# Patient Record
Sex: FEMALE | Race: WHITE | Employment: OTHER | URBAN - METROPOLITAN AREA
[De-identification: names, ages, dates, MRNs, and addresses within clinical notes are randomized per-mention and may not be internally consistent; named-entity substitution may affect disease eponyms.]

---

## 2017-09-14 ENCOUNTER — TELEPHONE (OUTPATIENT)
Dept: ADMINISTRATIVE | Age: 52
End: 2017-09-14

## 2017-10-16 ENCOUNTER — OFFICE VISIT (OUTPATIENT)
Dept: INTERNAL MEDICINE | Age: 52
End: 2017-10-16

## 2017-10-16 ENCOUNTER — HOSPITAL ENCOUNTER (OUTPATIENT)
Dept: GENERAL RADIOLOGY | Age: 52
Discharge: HOME | End: 2017-10-16
Payer: MEDICARE

## 2017-10-16 VITALS
OXYGEN SATURATION: 94 % | BODY MASS INDEX: 30.22 KG/M2 | DIASTOLIC BLOOD PRESSURE: 80 MMHG | SYSTOLIC BLOOD PRESSURE: 128 MMHG | TEMPERATURE: 97.1 F | HEART RATE: 88 BPM | HEIGHT: 64 IN | WEIGHT: 177 LBS

## 2017-10-16 DIAGNOSIS — Z12.11 COLON CANCER SCREENING: ICD-10-CM

## 2017-10-16 DIAGNOSIS — F32.4 DEPRESSION, MAJOR, SINGLE EPISODE, IN PARTIAL REMISSION (HCC): ICD-10-CM

## 2017-10-16 DIAGNOSIS — Z12.31 VISIT FOR SCREENING MAMMOGRAM: ICD-10-CM

## 2017-10-16 DIAGNOSIS — J45.41 MODERATE PERSISTENT ASTHMATIC BRONCHITIS WITH ACUTE EXACERBATION: ICD-10-CM

## 2017-10-16 DIAGNOSIS — J45.41 MODERATE PERSISTENT ASTHMATIC BRONCHITIS WITH ACUTE EXACERBATION: Primary | ICD-10-CM

## 2017-10-16 PROCEDURE — G8427 DOCREV CUR MEDS BY ELIG CLIN: HCPCS | Performed by: INTERNAL MEDICINE

## 2017-10-16 PROCEDURE — G8484 FLU IMMUNIZE NO ADMIN: HCPCS | Performed by: INTERNAL MEDICINE

## 2017-10-16 PROCEDURE — 71020 XR CHEST STANDARD TWO VW: CPT

## 2017-10-16 PROCEDURE — 99214 OFFICE O/P EST MOD 30 MIN: CPT | Performed by: INTERNAL MEDICINE

## 2017-10-16 PROCEDURE — G8417 CALC BMI ABV UP PARAM F/U: HCPCS | Performed by: INTERNAL MEDICINE

## 2017-10-16 PROCEDURE — 4004F PT TOBACCO SCREEN RCVD TLK: CPT | Performed by: INTERNAL MEDICINE

## 2017-10-16 PROCEDURE — 3017F COLORECTAL CA SCREEN DOC REV: CPT | Performed by: INTERNAL MEDICINE

## 2017-10-16 RX ORDER — ALBUTEROL SULFATE 90 UG/1
2 AEROSOL, METERED RESPIRATORY (INHALATION) EVERY 6 HOURS PRN
Qty: 1 INHALER | Refills: 3 | Status: SHIPPED | OUTPATIENT
Start: 2017-10-16 | End: 2018-10-03 | Stop reason: SDUPTHER

## 2017-10-16 RX ORDER — PREDNISONE 20 MG/1
20 TABLET ORAL 2 TIMES DAILY
Qty: 10 TABLET | Refills: 0 | Status: SHIPPED | OUTPATIENT
Start: 2017-10-16 | End: 2017-10-21

## 2017-10-16 RX ORDER — DEXTROMETHORPHAN HYDROBROMIDE AND PROMETHAZINE HYDROCHLORIDE 15; 6.25 MG/5ML; MG/5ML
5 SYRUP ORAL 4 TIMES DAILY PRN
Qty: 118 ML | Refills: 1 | Status: SHIPPED | OUTPATIENT
Start: 2017-10-16 | End: 2017-10-23

## 2017-10-16 RX ORDER — AZITHROMYCIN 250 MG/1
TABLET, FILM COATED ORAL
Qty: 1 PACKET | Refills: 0 | Status: SHIPPED | OUTPATIENT
Start: 2017-10-16 | End: 2017-10-26

## 2017-10-16 ASSESSMENT — PATIENT HEALTH QUESTIONNAIRE - PHQ9
1. LITTLE INTEREST OR PLEASURE IN DOING THINGS: 0
2. FEELING DOWN, DEPRESSED OR HOPELESS: 0
SUM OF ALL RESPONSES TO PHQ QUESTIONS 1-9: 0
SUM OF ALL RESPONSES TO PHQ9 QUESTIONS 1 & 2: 0

## 2017-10-16 ASSESSMENT — ENCOUNTER SYMPTOMS
ABDOMINAL DISTENTION: 0
COUGH: 1
CHOKING: 0
WHEEZING: 1
HEARTBURN: 0
SHORTNESS OF BREATH: 1
TROUBLE SWALLOWING: 0
BACK PAIN: 1
SORE THROAT: 0
VOICE CHANGE: 0
CHEST TIGHTNESS: 1
ABDOMINAL PAIN: 0

## 2017-10-16 NOTE — PROGRESS NOTES
Doreen Mccollum is a 49 y.o. female smoker, with history of depression, chronic back pain issues, who presents with     Chief Complaint   Patient presents with    Cough     5 days, SOB, I had bronchitis but his is different\", has just quit smoking    Health Maintenance     mammogram, colonoscopy    The patient has not been seen in the office for 2 years . The following laboratory reports since the past visit were reviewed (the ones pertinent to today's visit were discussed with the patient):    No visits with results within 3 Month(s) from this visit.    Latest known visit with results is:   Hospital Outpatient Visit on 04/30/2015   Component Date Value Ref Range Status    WBC 04/30/2015 12.5* 4.8 - 10.8 K/uL Final    RBC 04/30/2015 4.49  4.20 - 5.40 M/uL Final    Hemoglobin 04/30/2015 14.2  12.0 - 16.0 g/dL Final    Hematocrit 04/30/2015 44.0  37.0 - 47.0 % Final    MCV 04/30/2015 98.0  82.0 - 100.0 fL Final    MCH 04/30/2015 31.6* 27.0 - 31.3 pg Final    MCHC 04/30/2015 32.3* 33.0 - 37.0 % Final    RDW 04/30/2015 13.7  11.5 - 14.5 % Final    Platelets 97/30/3377 315  130 - 400 K/uL Final    MPV 04/30/2015 10.3  7.4 - 10.4 fL Final    Neutrophils % 04/30/2015 78.9  % Final    Lymphocytes % 04/30/2015 14.2  % Final    Monocytes % 04/30/2015 5.3  % Final    Eosinophils % 04/30/2015 0.5  % Final    Basophils % 04/30/2015 1.1  % Final    Neutrophils # 04/30/2015 9.8* 1.4 - 6.5 K/uL Final    Lymphocytes # 04/30/2015 1.8  1.0 - 4.8 K/uL Final    Monocytes # 04/30/2015 0.7  0.2 - 0.8 K/uL Final    Eosinophils # 04/30/2015 0.1  0.0 - 0.7 K/uL Final    Basophils # 04/30/2015 0.1  0.0 - 0.2 K/uL Final    Sodium 04/30/2015 138  132 - 144 mEq/L Final    Potassium 04/30/2015 4.1  3.5 - 5.1 mEq/L Final    Chloride 04/30/2015 103  98 - 107 mEq/L Final    CO2 04/30/2015 22  22 - 29 mEq/L Final    Anion Gap 04/30/2015 13  7 - 13 mEq/L Final    Glucose 04/30/2015 101  74 - 109 mg/dL Final    BUN is oriented to person, place, and time. She appears distressed (anxious). Obese, age appropriate, well groomed     HENT:   Head: Atraumatic. Mouth/Throat: Oropharynx is clear and moist.   Eyes: Conjunctivae and EOM are normal. Right eye exhibits no discharge. Left eye exhibits no discharge. Neck: Normal range of motion. Neck supple. No JVD present. No thyromegaly present. Cardiovascular: Regular rhythm and normal heart sounds. Exam reveals no gallop. Pulmonary/Chest: Effort normal. No respiratory distress. She has wheezes (Diffuse, bilateral, air entry remains adequate). She has rales (Small rhonchi, diffuse, bilateral). She exhibits no tenderness. Abdominal: Soft. She exhibits no distension. There is no tenderness. Musculoskeletal: Normal range of motion. Lymphadenopathy:     She has no cervical adenopathy. Neurological: She is alert and oriented to person, place, and time. Coordination normal.   Skin: Skin is warm. No rash noted. Psychiatric: Her speech is normal. Her mood appears anxious. She is not slowed and not withdrawn. Cognition and memory are normal. She does not express inappropriate judgment. She does not exhibit a depressed mood. She exhibits normal recent memory and normal remote memory. She is attentive. Assessment:    Gabrielle Garcia was seen today for cough and health maintenance. Diagnoses and all orders for this visit:    Moderate persistent asthmatic bronchitis with acute exacerbation  -     XR CHEST STANDARD (2 VW); Future        Schedule PFT after full resolution of the acute episode, suspect COPD    Visit for screening mammogram  -     ERNESTO DIGITAL SCREEN W CAD BILATERAL; Future    Colon cancer screening  -     Referral To Gastroenterology - ([facility]IL[facility]) Daryn Rai MD    Other orders  -     predniSONE (DELTASONE) 20 MG tablet; Take 1 tablet by mouth 2 times daily for 5 days  -     azithromycin (ZITHROMAX) 250 MG tablet;  Take 2 tabs (500 mg) on Day 1, and take 1 tab (250 mg) on findings, after discussion and with patient's consent. Will contact patient for discussion and further management planning once reports available for review. Orders Placed This Encounter   Procedures    ERNESTO DIGITAL SCREEN W CAD BILATERAL     Standing Status:   Future     Standing Expiration Date:   10/16/2018    XR CHEST STANDARD (2 VW)     Standing Status:   Future     Number of Occurrences:   1     Standing Expiration Date:   10/16/2018    Referral To Gastroenterology - ([facility]ILA) Raye Habermann, MD     Referral Priority:   Routine     Referral Type:   Consult for Advice and Opinion     Referral Reason:   Specialty Services Required     Referred to Provider:   Cyndy Zarate MD     Requested Specialty:   Gastroenterology     Number of Visits Requested:   1     Patient will call if the prescribed treatment does not help relieve symptoms of the presenting condition (or for any medication-related adverse effects), or go to the ER if symptoms worsen. Further workup and plan will be determined based on clinical progression and follow up test/ treatment results. Close follow up needed to evaluate treatment results and for care coordination. Return in about 3 weeks (around 11/6/2017). I have reviewed the patient's medical and surgical, family and social history, health maintenance schedule, and updated the computerized patient record.        Electronically signed by Shailesh Bowser MD

## 2017-10-30 ENCOUNTER — TELEPHONE (OUTPATIENT)
Dept: INTERNAL MEDICINE | Age: 52
End: 2017-10-30

## 2017-10-30 RX ORDER — AZITHROMYCIN 250 MG/1
TABLET, FILM COATED ORAL
Qty: 1 PACKET | Refills: 0 | Status: SHIPPED | OUTPATIENT
Start: 2017-10-30 | End: 2017-11-06 | Stop reason: ALTCHOICE

## 2017-11-03 ENCOUNTER — TELEPHONE (OUTPATIENT)
Dept: INTERNAL MEDICINE | Age: 52
End: 2017-11-03

## 2017-11-03 DIAGNOSIS — J45.901 ASTHMATIC BRONCHITIS WITH ACUTE EXACERBATION, UNSPECIFIED ASTHMA SEVERITY, UNSPECIFIED WHETHER PERSISTENT: Primary | ICD-10-CM

## 2017-11-03 RX ORDER — PREDNISONE 20 MG/1
20 TABLET ORAL 2 TIMES DAILY
Qty: 10 TABLET | Refills: 0 | Status: SHIPPED | OUTPATIENT
Start: 2017-11-03 | End: 2017-11-08

## 2017-11-03 NOTE — TELEPHONE ENCOUNTER
Pt states asthma has been bothering her and wants to know if you can prescribe prednisone please to [facility]

## 2017-11-06 ENCOUNTER — OFFICE VISIT (OUTPATIENT)
Dept: INTERNAL MEDICINE | Age: 52
End: 2017-11-06

## 2017-11-06 VITALS
SYSTOLIC BLOOD PRESSURE: 140 MMHG | HEART RATE: 83 BPM | TEMPERATURE: 98.1 F | OXYGEN SATURATION: 95 % | WEIGHT: 176 LBS | BODY MASS INDEX: 30.05 KG/M2 | HEIGHT: 64 IN | DIASTOLIC BLOOD PRESSURE: 90 MMHG

## 2017-11-06 DIAGNOSIS — J45.41 MODERATE PERSISTENT ASTHMATIC BRONCHITIS WITH ACUTE EXACERBATION: ICD-10-CM

## 2017-11-06 PROBLEM — J45.909 ASTHMATIC BRONCHITIS: Status: ACTIVE | Noted: 2017-01-01

## 2017-11-06 PROCEDURE — G8484 FLU IMMUNIZE NO ADMIN: HCPCS | Performed by: INTERNAL MEDICINE

## 2017-11-06 PROCEDURE — 3014F SCREEN MAMMO DOC REV: CPT | Performed by: INTERNAL MEDICINE

## 2017-11-06 PROCEDURE — 99213 OFFICE O/P EST LOW 20 MIN: CPT | Performed by: INTERNAL MEDICINE

## 2017-11-06 PROCEDURE — 4004F PT TOBACCO SCREEN RCVD TLK: CPT | Performed by: INTERNAL MEDICINE

## 2017-11-06 PROCEDURE — G8417 CALC BMI ABV UP PARAM F/U: HCPCS | Performed by: INTERNAL MEDICINE

## 2017-11-06 PROCEDURE — G8427 DOCREV CUR MEDS BY ELIG CLIN: HCPCS | Performed by: INTERNAL MEDICINE

## 2017-11-06 PROCEDURE — 3017F COLORECTAL CA SCREEN DOC REV: CPT | Performed by: INTERNAL MEDICINE

## 2017-11-06 RX ORDER — PROMETHAZINE HYDROCHLORIDE AND PHENYLEPHRINE HYDROCHLORIDE 6.25; 5 MG/5ML; MG/5ML
5 SYRUP ORAL 4 TIMES DAILY PRN
Qty: 180 ML | Refills: 0 | Status: SHIPPED | OUTPATIENT
Start: 2017-11-06 | End: 2017-11-27

## 2017-11-06 RX ORDER — BENZONATATE 200 MG/1
200 CAPSULE ORAL 2 TIMES DAILY PRN
Qty: 20 CAPSULE | Refills: 1 | Status: ON HOLD | OUTPATIENT
Start: 2017-11-06 | End: 2017-11-18 | Stop reason: HOSPADM

## 2017-11-06 ASSESSMENT — ENCOUNTER SYMPTOMS
COUGH: 1
SORE THROAT: 0
WHEEZING: 0
SHORTNESS OF BREATH: 1
HEARTBURN: 0
BACK PAIN: 1

## 2017-11-06 NOTE — PROGRESS NOTES
Robb Henriquez is a 47 y.o. female smoker, with depression, chronic back pain issues, who presents with     Chief Complaint   Patient presents with    Cough     persistent x more than 3 weeks, was unable to smoke, no medication helped, is asking for a cough syrup she had in 2013 along with cough pills, just started the oral prednisone   Since the past office visit, the patient has continued to cough in spite of all the medications, over-the-counter and prescriptions. Chest x-ray was negative for pneumonia or COPD. The following laboratory reports since the past visit were reviewed (the ones pertinent to today's visit were discussed with the patient):    No visits with results within 3 Month(s) from this visit.    Latest known visit with results is:   Hospital Outpatient Visit on 04/30/2015   Component Date Value Ref Range Status    WBC 04/30/2015 12.5* 4.8 - 10.8 K/uL Final    RBC 04/30/2015 4.49  4.20 - 5.40 M/uL Final    Hemoglobin 04/30/2015 14.2  12.0 - 16.0 g/dL Final    Hematocrit 04/30/2015 44.0  37.0 - 47.0 % Final    MCV 04/30/2015 98.0  82.0 - 100.0 fL Final    MCH 04/30/2015 31.6* 27.0 - 31.3 pg Final    MCHC 04/30/2015 32.3* 33.0 - 37.0 % Final    RDW 04/30/2015 13.7  11.5 - 14.5 % Final    Platelets 31/74/4859 315  130 - 400 K/uL Final    MPV 04/30/2015 10.3  7.4 - 10.4 fL Final    Neutrophils % 04/30/2015 78.9  % Final    Lymphocytes % 04/30/2015 14.2  % Final    Monocytes % 04/30/2015 5.3  % Final    Eosinophils % 04/30/2015 0.5  % Final    Basophils % 04/30/2015 1.1  % Final    Neutrophils # 04/30/2015 9.8* 1.4 - 6.5 K/uL Final    Lymphocytes # 04/30/2015 1.8  1.0 - 4.8 K/uL Final    Monocytes # 04/30/2015 0.7  0.2 - 0.8 K/uL Final    Eosinophils # 04/30/2015 0.1  0.0 - 0.7 K/uL Final    Basophils # 04/30/2015 0.1  0.0 - 0.2 K/uL Final    Sodium 04/30/2015 138  132 - 144 mEq/L Final    Potassium 04/30/2015 4.1  3.5 - 5.1 mEq/L Final    Chloride 04/30/2015 103  98 - 107 mEq/L Final    CO2 04/30/2015 22  22 - 29 mEq/L Final    Anion Gap 04/30/2015 13  7 - 13 mEq/L Final    Glucose 04/30/2015 101  74 - 109 mg/dL Final    BUN 04/30/2015 6  6 - 20 mg/dL Final    CREATININE 04/30/2015 0.44* 0.50 - 0.90 mg/dL Final    GFR Non- 04/30/2015 >60.0  >60 Final    Comment: >60 mL/min/1.73m2 EGFR, calc. for ages 25 and older using the  MDRD formula (not corrected for weight), is valid for stable  renal function.  GFR  04/30/2015 >60.0  >60 Final    Comment: >60 mL/min/1.73m2 EGFR, calc. for ages 25 and older using the  MDRD formula (not corrected for weight), is valid for stable  renal function.  Calcium 04/30/2015 9.4  8.6 - 10.2 mg/dL Final    Total Protein 04/30/2015 7.3  6.4 - 8.1 g/dL Final    Alb 04/30/2015 4.6  3.9 - 4.9 g/dL Final    Total Bilirubin 04/30/2015 0.4  0.0 - 1.2 mg/dL Final    Alkaline Phosphatase 04/30/2015 61  40 - 130 U/L Final    ALT 04/30/2015 11  0 - 33 U/L Final    AST 04/30/2015 15  0 - 35 U/L Final    Globulin 04/30/2015 2.7  2.3 - 3.5 g/dL Final    Cholesterol, Total 04/30/2015 192  0 - 199 mg/dL Final    Triglycerides 04/30/2015 68  0 - 200 mg/dL Final    HDL 04/30/2015 75* 40 - 59 mg/dL Final    Comment: ATP III HDL Cholesterol Classification is high. Expected Values:  Males:    >55 = No Risk            35-55 = Moderate Risk            <35 = High Risk  Females:  >65 = No Risk            45-65 = Moderate Risk            <45 = High Risk  NCEP Guidelines: Third Report May 2001  >59 = negative risk factor for CHD  <40 = major risk factor for CHD      LDL Calculated 04/30/2015 103  0 - 129 mg/dL Final    Vit D, 25-Hydroxy 04/30/2015 27.3* 30.0 - 100.0 ng/mL Final    Comment: (20-30 ng/mL)  Insufficiency  This assay accurately quantifies the sum of vitamin D3, 25-Hydroxy and  vitamin D2, 25-Hyroxy.  TSH 04/30/2015 1.240  0.270 - 4.200 uIU/mL Final       HPI:    Cough   This is a recurrent problem.  The current episode started 1 to 4 weeks ago. The problem has been unchanged. The problem occurs constantly. The cough is productive of purulent sputum. Associated symptoms include myalgias (thigh muscles) and shortness of breath. Pertinent negatives include no chest pain, chills, fever, heartburn, nasal congestion, postnasal drip, rash, sore throat or wheezing. The symptoms are aggravated by exercise, lying down and stress. Risk factors for lung disease include smoking/tobacco exposure. She has tried OTC cough suppressant and rest for the symptoms. The treatment provided no relief. Her past medical history is significant for bronchitis. There is no history of environmental allergies. More detail above in the chief complaint(s) and below in the review of systems.      Past Medical History:   Diagnosis Date    Asthmatic bronchitis 2017    Chronic back pain greater than 3 months duration 2012    Dr Kimberly Parsons Depression 2010    Dr Hummel [facility])    Family history of malignant neoplasm of breast     H/O vitamin D deficiency        Past Surgical History:   Procedure Laterality Date    BREAST CYST EXCISION Right 2000    benign    TONSILLECTOMY         Social History     Social History    Marital status: Single     Spouse name: N/A    Number of children: 2    Years of education: N/A     Occupational History    , now on       Social History Main Topics    Smoking status: Current Some Day Smoker     Packs/day: 0.25     Types: Cigarettes    Smokeless tobacco: Never Used      Comment: pt trying to cut back    Alcohol use 0.0 oz/week    Drug use: No    Sexual activity: Yes     Other Topics Concern    Not on file     Social History Narrative    Born in [facility], one of 3 siblings    Single, 2 sons, in [place] and [place]    Lives in a mobil home in [place] alone    Worked as a  x 15 years    On Medicare for depression x 2011       Family History   Problem Relation Age of Onset    Heart Failure Mother      dec 80    Osteoarthritis Mother     Cancer Mother 61     breast     Heart Attack Father      dec 58    Hypertension Father     High Cholesterol Father     Cancer Sister 64     pancreas, survived   [facility] Depression Sister        Family and social history were reviewed and pertinent changes documented. ALLERGIES    Morphine; Abilify [aripiprazole]; and Effexor [venlafaxine]    Current Outpatient Prescriptions on File Prior to Visit   Medication Sig Dispense Refill    predniSONE (DELTASONE) 20 MG tablet Take 1 tablet by mouth 2 times daily for 5 days 10 tablet 0    albuterol sulfate HFA (PROAIR HFA) 108 (90 Base) MCG/ACT inhaler Inhale 2 puffs into the lungs every 6 hours as needed for Wheezing 1 Inhaler 3    FLUoxetine (PROZAC) 20 MG tablet Take 1 tablet by mouth daily 30 tablet 3    Vitamin D (CHOLECALCIFEROL) 1000 UNITS CAPS capsule Take 1,000 Units by mouth daily      Loratadine (CLARITIN) 10 MG CAPS Take 1 capsule by mouth daily      traMADol (ULTRAM) 50 MG tablet Take 50 mg by mouth every 6 hours as needed. 0     No current facility-administered medications on file prior to visit. Review of Systems   Constitutional: Negative for chills and fever. HENT: Negative for postnasal drip and sore throat. Respiratory: Positive for cough and shortness of breath. Negative for wheezing. Cardiovascular: Negative. Negative for chest pain and palpitations. Gastrointestinal: Negative for heartburn. Endocrine: Negative for cold intolerance and heat intolerance. Genitourinary: Negative for hematuria. Musculoskeletal: Positive for back pain and myalgias (thigh muscles). Negative for arthralgias, neck pain and neck stiffness. Skin: Negative for rash. Allergic/Immunologic: Negative for environmental allergies. Neurological: Negative for speech difficulty, weakness and light-headedness.    Psychiatric/Behavioral: Positive for dysphoric mood and sleep disturbance (due ot cough). Negative for decreased concentration. The patient is nervous/anxious. Vitals:    11/06/17 1008   BP: (!) 140/90   Site: Right Arm   Position: Sitting   Cuff Size: Medium Adult   Pulse: 83   Temp: 98.1 °F (36.7 °C)   TempSrc: Tympanic   SpO2: 95%   Weight: 176 lb (79.8 kg)   Height: 5' 4\" (1.626 m)       Physical Exam   Constitutional: She is oriented to person, place, and time. She appears distressed (anxious). Obese, age appropriate, well groomed     HENT:   Head: Atraumatic. Mouth/Throat: Oropharynx is clear and moist.   Eyes: Conjunctivae and EOM are normal.   Neck: Normal range of motion. Neck supple. No JVD present. Cardiovascular: Regular rhythm and normal heart sounds. Exam reveals no gallop. Pulmonary/Chest: Effort normal. No respiratory distress. She has no wheezes. She has no rales. Diminished breath sounds bilaterally     Abdominal: Soft. She exhibits no distension. Neurological: She is alert and oriented to person, place, and time. Skin: Skin is warm. There is erythema (facial plethora). Psychiatric: Judgment normal. Her mood appears anxious. Her speech is rapid and/or pressured. She is not slowed and not withdrawn. Thought content is not delusional. Cognition and memory are normal. She does not express inappropriate judgment. She exhibits a depressed mood. She exhibits normal recent memory and normal remote memory. She is attentive. Assessment:    Kayley Francis was seen today for cough. Diagnoses and all orders for this visit:    Moderate persistent asthmatic bronchitis with acute exacerbation  Comments:  Continue oral steroids, start antihistamine decongestant syrup, abstain from smoking  Pulmonary function tests when the acute illness resolves    Other orders  -     Promethazine-Phenylephrine (PHENERGAN-VC) 6.25-5 MG/5ML syrup; Take 5 mLs by mouth 4 times daily as needed for Cough  -     benzonatate (TESSALON) 200 MG capsule;  Take 1 capsule by mouth 2 times daily as needed for Cough  -     umeclidinium-vilanterol (ANORO ELLIPTA) 62.5-25 MCG/INH AEPB inhaler; Inhale 1 puff into the lungs daily        Plan:    Reviewed with the patient (/and caregiver if present): current health status, medications, activities and diet. See also orders and comments in the assessment section. Today's diagnosis (in the context of chronic problems) was discussed with patient (/and caregiver if present), questions answered. Smoking cessation discussed, motivational counseling done, smoking cessation aids offered, patient will focus on a preferred personal approach to this problem and ask for our help as needed. Side effects, adverse effects of the medication prescribed (or refilled) today, as well as treatment plan/ rationale and result expectations have (again) been discussed with the patient who expresses understanding and consents to proceed as outlined above. Additional advise included in the \"after visit summary\". Orders Placed This Encounter   Medications    Promethazine-Phenylephrine (PHENERGAN-VC) 6.25-5 MG/5ML syrup     Sig: Take 5 mLs by mouth 4 times daily as needed for Cough     Dispense:  180 mL     Refill:  0    benzonatate (TESSALON) 200 MG capsule     Sig: Take 1 capsule by mouth 2 times daily as needed for Cough     Dispense:  20 capsule     Refill:  1    umeclidinium-vilanterol (ANORO ELLIPTA) 62.5-25 MCG/INH AEPB inhaler     Sig: Inhale 1 puff into the lungs daily     Dispense:  1 each     Refill:  0     Further workup and plan will be determined based on clinical progression and follow up test/ treatment results. Close follow up needed to evaluate treatment results and for care coordination. Return if symptoms worsen or fail to improve. I have reviewed the patient's medical and surgical, family and social history, health maintenance schedule, and updated the computerized patient record.     Please note this report has been partially produced by using

## 2017-11-08 ENCOUNTER — TELEPHONE (OUTPATIENT)
Dept: INTERNAL MEDICINE | Age: 52
End: 2017-11-08

## 2017-11-08 RX ORDER — UMECLIDINIUM 62.5 UG/1
AEROSOL, POWDER ORAL
Refills: 0 | COMMUNITY
Start: 2017-11-06 | End: 2017-12-12

## 2017-11-08 RX ORDER — TOBRAMYCIN 3 MG/ML
1 SOLUTION/ DROPS OPHTHALMIC EVERY 4 HOURS
Qty: 5 ML | Refills: 0 | Status: ON HOLD | OUTPATIENT
Start: 2017-11-08 | End: 2017-11-18 | Stop reason: HOSPADM

## 2017-11-14 ENCOUNTER — APPOINTMENT (OUTPATIENT)
Dept: GENERAL RADIOLOGY | Age: 52
DRG: 192 | End: 2017-11-14
Payer: MEDICARE

## 2017-11-14 ENCOUNTER — HOSPITAL ENCOUNTER (INPATIENT)
Age: 52
LOS: 4 days | Discharge: HOME | DRG: 192 | End: 2017-11-18
Attending: INTERNAL MEDICINE | Admitting: INTERNAL MEDICINE
Payer: MEDICARE

## 2017-11-14 DIAGNOSIS — J44.9 CHRONIC OBSTRUCTIVE PULMONARY DISEASE, UNSPECIFIED COPD TYPE (HCC): Primary | ICD-10-CM

## 2017-11-14 DIAGNOSIS — R09.02 HYPOXIA: ICD-10-CM

## 2017-11-14 PROBLEM — J44.0 COPD (CHRONIC OBSTRUCTIVE PULMONARY DISEASE) WITH ACUTE BRONCHITIS (HCC): Status: ACTIVE | Noted: 2017-11-14

## 2017-11-14 PROBLEM — Z72.0 TOBACCO ABUSE: Status: ACTIVE | Noted: 2017-11-14

## 2017-11-14 PROBLEM — J20.9 ACUTE BRONCHITIS: Status: ACTIVE | Noted: 2017-11-14

## 2017-11-14 LAB
ALBUMIN SERPL-MCNC: 4.7 G/DL (ref 3.9–4.9)
ALP BLD-CCNC: 82 U/L (ref 40–130)
ALT SERPL-CCNC: 17 U/L (ref 0–33)
ANION GAP SERPL CALCULATED.3IONS-SCNC: 17 MEQ/L (ref 7–13)
APTT: 29 SEC (ref 21.6–35.4)
AST SERPL-CCNC: 19 U/L (ref 0–35)
BILIRUB SERPL-MCNC: 0.3 MG/DL (ref 0–1.2)
BILIRUBIN URINE: NEGATIVE
BLOOD, URINE: NEGATIVE
BUN BLDV-MCNC: 10 MG/DL (ref 6–20)
CALCIUM SERPL-MCNC: 9.6 MG/DL (ref 8.6–10.2)
CHLORIDE BLD-SCNC: 101 MEQ/L (ref 98–107)
CLARITY: CLEAR
CO2: 23 MEQ/L (ref 22–29)
COLOR: YELLOW
CREAT SERPL-MCNC: 0.39 MG/DL (ref 0.5–0.9)
EKG ATRIAL RATE: 84 BPM
EKG P AXIS: 69 DEGREES
EKG P-R INTERVAL: 132 MS
EKG Q-T INTERVAL: 370 MS
EKG QRS DURATION: 84 MS
EKG QTC CALCULATION (BAZETT): 437 MS
EKG R AXIS: 79 DEGREES
EKG T AXIS: 36 DEGREES
EKG VENTRICULAR RATE: 84 BPM
GFR AFRICAN AMERICAN: >60
GFR NON-AFRICAN AMERICAN: >60
GLOBULIN: 3.1 G/DL (ref 2.3–3.5)
GLUCOSE BLD-MCNC: 117 MG/DL (ref 74–109)
GLUCOSE URINE: NEGATIVE MG/DL
HCT VFR BLD CALC: 45.1 % (ref 37–47)
HEMOGLOBIN: 15.1 G/DL (ref 12–16)
INR BLD: 1
KETONES, URINE: NEGATIVE MG/DL
LEUKOCYTE ESTERASE, URINE: NEGATIVE
MCH RBC QN AUTO: 31.1 PG (ref 27–31.3)
MCHC RBC AUTO-ENTMCNC: 33.4 % (ref 33–37)
MCV RBC AUTO: 93.1 FL (ref 82–100)
NITRITE, URINE: NEGATIVE
PDW BLD-RTO: 13.4 % (ref 11.5–14.5)
PH UA: 5.5 (ref 5–9)
PLATELET # BLD: 358 K/UL (ref 130–400)
POTASSIUM SERPL-SCNC: 4.1 MEQ/L (ref 3.5–5.1)
PRO-BNP: 98 PG/ML
PROTEIN UA: NEGATIVE MG/DL
PROTHROMBIN TIME: 10.2 SEC (ref 8.1–13.7)
RBC # BLD: 4.84 M/UL (ref 4.2–5.4)
SODIUM BLD-SCNC: 141 MEQ/L (ref 132–144)
SPECIFIC GRAVITY UA: 1 (ref 1–1.03)
TOTAL CK: 106 U/L (ref 0–170)
TOTAL PROTEIN: 7.8 G/DL (ref 6.4–8.1)
TROPONIN: <0.01 NG/ML (ref 0–0.01)
URINE REFLEX TO CULTURE: NORMAL
UROBILINOGEN, URINE: 0.2 E.U./DL
WBC # BLD: 12.7 K/UL (ref 4.8–10.8)

## 2017-11-14 PROCEDURE — 36415 COLL VENOUS BLD VENIPUNCTURE: CPT

## 2017-11-14 PROCEDURE — 94640 AIRWAY INHALATION TREATMENT: CPT

## 2017-11-14 PROCEDURE — 82550 ASSAY OF CK (CPK): CPT

## 2017-11-14 PROCEDURE — 6370000000 HC RX 637 (ALT 250 FOR IP): Performed by: PHYSICIAN ASSISTANT

## 2017-11-14 PROCEDURE — 81003 URINALYSIS AUTO W/O SCOPE: CPT

## 2017-11-14 PROCEDURE — 99285 EMERGENCY DEPT VISIT HI MDM: CPT

## 2017-11-14 PROCEDURE — 99222 1ST HOSP IP/OBS MODERATE 55: CPT | Performed by: INTERNAL MEDICINE

## 2017-11-14 PROCEDURE — 84484 ASSAY OF TROPONIN QUANT: CPT

## 2017-11-14 PROCEDURE — 85730 THROMBOPLASTIN TIME PARTIAL: CPT

## 2017-11-14 PROCEDURE — 6360000002 HC RX W HCPCS: Performed by: PHYSICIAN ASSISTANT

## 2017-11-14 PROCEDURE — 87040 BLOOD CULTURE FOR BACTERIA: CPT

## 2017-11-14 PROCEDURE — 80053 COMPREHEN METABOLIC PANEL: CPT

## 2017-11-14 PROCEDURE — 83880 ASSAY OF NATRIURETIC PEPTIDE: CPT

## 2017-11-14 PROCEDURE — 96374 THER/PROPH/DIAG INJ IV PUSH: CPT

## 2017-11-14 PROCEDURE — 85610 PROTHROMBIN TIME: CPT

## 2017-11-14 PROCEDURE — 85027 COMPLETE CBC AUTOMATED: CPT

## 2017-11-14 PROCEDURE — 2580000003 HC RX 258: Performed by: PHYSICIAN ASSISTANT

## 2017-11-14 PROCEDURE — 94664 DEMO&/EVAL PT USE INHALER: CPT

## 2017-11-14 PROCEDURE — 93010 ELECTROCARDIOGRAM REPORT: CPT | Performed by: INTERNAL MEDICINE

## 2017-11-14 PROCEDURE — 6370000000 HC RX 637 (ALT 250 FOR IP): Performed by: INTERNAL MEDICINE

## 2017-11-14 PROCEDURE — 6360000002 HC RX W HCPCS: Performed by: INTERNAL MEDICINE

## 2017-11-14 PROCEDURE — 71010 XR CHEST PORTABLE: CPT

## 2017-11-14 PROCEDURE — 1210000000 HC MED SURG R&B

## 2017-11-14 PROCEDURE — 93005 ELECTROCARDIOGRAM TRACING: CPT

## 2017-11-14 RX ORDER — MAGNESIUM SULFATE IN WATER 40 MG/ML
4 INJECTION, SOLUTION INTRAVENOUS ONCE
Status: COMPLETED | OUTPATIENT
Start: 2017-11-14 | End: 2017-11-14

## 2017-11-14 RX ORDER — GUAIFENESIN 600 MG/1
600 TABLET, EXTENDED RELEASE ORAL 2 TIMES DAILY
Status: DISCONTINUED | OUTPATIENT
Start: 2017-11-14 | End: 2017-11-18 | Stop reason: HOSPADM

## 2017-11-14 RX ORDER — METHYLPREDNISOLONE SODIUM SUCCINATE 125 MG/2ML
125 INJECTION, POWDER, LYOPHILIZED, FOR SOLUTION INTRAMUSCULAR; INTRAVENOUS ONCE
Status: COMPLETED | OUTPATIENT
Start: 2017-11-14 | End: 2017-11-14

## 2017-11-14 RX ORDER — SODIUM CHLORIDE 0.9 % (FLUSH) 0.9 %
10 SYRINGE (ML) INJECTION PRN
Status: DISCONTINUED | OUTPATIENT
Start: 2017-11-14 | End: 2017-11-18 | Stop reason: HOSPADM

## 2017-11-14 RX ORDER — ACETAMINOPHEN 325 MG/1
650 TABLET ORAL EVERY 4 HOURS PRN
Status: DISCONTINUED | OUTPATIENT
Start: 2017-11-14 | End: 2017-11-18 | Stop reason: HOSPADM

## 2017-11-14 RX ORDER — BENZONATATE 100 MG/1
100 CAPSULE ORAL 3 TIMES DAILY PRN
Status: DISCONTINUED | OUTPATIENT
Start: 2017-11-14 | End: 2017-11-18 | Stop reason: HOSPADM

## 2017-11-14 RX ORDER — LEVOFLOXACIN 5 MG/ML
500 INJECTION, SOLUTION INTRAVENOUS EVERY 24 HOURS
Status: DISCONTINUED | OUTPATIENT
Start: 2017-11-14 | End: 2017-11-16

## 2017-11-14 RX ORDER — NICOTINE 21 MG/24HR
1 PATCH, TRANSDERMAL 24 HOURS TRANSDERMAL DAILY
Status: DISCONTINUED | OUTPATIENT
Start: 2017-11-14 | End: 2017-11-18 | Stop reason: HOSPADM

## 2017-11-14 RX ORDER — ONDANSETRON 2 MG/ML
4 INJECTION INTRAMUSCULAR; INTRAVENOUS EVERY 6 HOURS PRN
Status: DISCONTINUED | OUTPATIENT
Start: 2017-11-14 | End: 2017-11-18 | Stop reason: HOSPADM

## 2017-11-14 RX ORDER — IPRATROPIUM BROMIDE AND ALBUTEROL SULFATE 2.5; .5 MG/3ML; MG/3ML
1 SOLUTION RESPIRATORY (INHALATION) 3 TIMES DAILY
Status: DISCONTINUED | OUTPATIENT
Start: 2017-11-15 | End: 2017-11-15

## 2017-11-14 RX ORDER — ALBUTEROL SULFATE 2.5 MG/3ML
2.5 SOLUTION RESPIRATORY (INHALATION)
Status: DISCONTINUED | OUTPATIENT
Start: 2017-11-14 | End: 2017-11-18 | Stop reason: HOSPADM

## 2017-11-14 RX ORDER — IPRATROPIUM BROMIDE AND ALBUTEROL SULFATE 2.5; .5 MG/3ML; MG/3ML
1 SOLUTION RESPIRATORY (INHALATION)
Status: DISCONTINUED | OUTPATIENT
Start: 2017-11-14 | End: 2017-11-14

## 2017-11-14 RX ORDER — METHYLPREDNISOLONE SODIUM SUCCINATE 40 MG/ML
40 INJECTION, POWDER, LYOPHILIZED, FOR SOLUTION INTRAMUSCULAR; INTRAVENOUS EVERY 8 HOURS
Status: DISCONTINUED | OUTPATIENT
Start: 2017-11-14 | End: 2017-11-18 | Stop reason: HOSPADM

## 2017-11-14 RX ORDER — IPRATROPIUM BROMIDE AND ALBUTEROL SULFATE 2.5; .5 MG/3ML; MG/3ML
1 SOLUTION RESPIRATORY (INHALATION)
Status: COMPLETED | OUTPATIENT
Start: 2017-11-14 | End: 2017-11-14

## 2017-11-14 RX ORDER — ALBUTEROL SULFATE 2.5 MG/3ML
2.5 SOLUTION RESPIRATORY (INHALATION) EVERY 6 HOURS PRN
Status: DISCONTINUED | OUTPATIENT
Start: 2017-11-14 | End: 2017-11-14

## 2017-11-14 RX ORDER — SODIUM CHLORIDE 9 MG/ML
INJECTION, SOLUTION INTRAVENOUS CONTINUOUS
Status: DISCONTINUED | OUTPATIENT
Start: 2017-11-14 | End: 2017-11-14

## 2017-11-14 RX ORDER — NICOTINE 21 MG/24HR
1 PATCH, TRANSDERMAL 24 HOURS TRANSDERMAL DAILY
Status: DISCONTINUED | OUTPATIENT
Start: 2017-11-14 | End: 2017-11-14

## 2017-11-14 RX ORDER — TRAMADOL HYDROCHLORIDE 50 MG/1
50 TABLET ORAL EVERY 6 HOURS PRN
Status: DISCONTINUED | OUTPATIENT
Start: 2017-11-14 | End: 2017-11-18 | Stop reason: HOSPADM

## 2017-11-14 RX ORDER — SODIUM CHLORIDE 0.9 % (FLUSH) 0.9 %
10 SYRINGE (ML) INJECTION EVERY 12 HOURS SCHEDULED
Status: DISCONTINUED | OUTPATIENT
Start: 2017-11-14 | End: 2017-11-18 | Stop reason: HOSPADM

## 2017-11-14 RX ORDER — FLUOXETINE HYDROCHLORIDE 20 MG/1
20 CAPSULE ORAL DAILY
Status: DISCONTINUED | OUTPATIENT
Start: 2017-11-14 | End: 2017-11-18 | Stop reason: HOSPADM

## 2017-11-14 RX ORDER — TOBRAMYCIN 3 MG/ML
1 SOLUTION/ DROPS OPHTHALMIC EVERY 4 HOURS
Status: DISCONTINUED | OUTPATIENT
Start: 2017-11-14 | End: 2017-11-18 | Stop reason: HOSPADM

## 2017-11-14 RX ADMIN — MAGNESIUM SULFATE HEPTAHYDRATE 4 G: 40 INJECTION, SOLUTION INTRAVENOUS at 12:57

## 2017-11-14 RX ADMIN — IPRATROPIUM BROMIDE AND ALBUTEROL SULFATE 1 AMPULE: .5; 3 SOLUTION RESPIRATORY (INHALATION) at 11:45

## 2017-11-14 RX ADMIN — GUAIFENESIN 600 MG: 600 TABLET, EXTENDED RELEASE ORAL at 17:20

## 2017-11-14 RX ADMIN — IPRATROPIUM BROMIDE AND ALBUTEROL SULFATE 1 AMPULE: .5; 3 SOLUTION RESPIRATORY (INHALATION) at 11:38

## 2017-11-14 RX ADMIN — IPRATROPIUM BROMIDE AND ALBUTEROL SULFATE 1 AMPULE: .5; 3 SOLUTION RESPIRATORY (INHALATION) at 17:07

## 2017-11-14 RX ADMIN — Medication 10 ML: at 20:30

## 2017-11-14 RX ADMIN — LEVOFLOXACIN 500 MG: 5 INJECTION, SOLUTION INTRAVENOUS at 17:36

## 2017-11-14 RX ADMIN — IPRATROPIUM BROMIDE AND ALBUTEROL SULFATE 1 AMPULE: .5; 3 SOLUTION RESPIRATORY (INHALATION) at 11:28

## 2017-11-14 RX ADMIN — GUAIFENESIN 600 MG: 600 TABLET, EXTENDED RELEASE ORAL at 20:30

## 2017-11-14 RX ADMIN — METHYLPREDNISOLONE SODIUM SUCCINATE 125 MG: 125 INJECTION, POWDER, FOR SOLUTION INTRAMUSCULAR; INTRAVENOUS at 11:33

## 2017-11-14 RX ADMIN — METHYLPREDNISOLONE SODIUM SUCCINATE 40 MG: 40 INJECTION, POWDER, FOR SOLUTION INTRAMUSCULAR; INTRAVENOUS at 20:29

## 2017-11-14 RX ADMIN — TOBRAMYCIN 1 DROP: 3 SOLUTION OPHTHALMIC at 20:28

## 2017-11-14 RX ADMIN — IPRATROPIUM BROMIDE AND ALBUTEROL SULFATE 1 AMPULE: .5; 3 SOLUTION RESPIRATORY (INHALATION) at 20:05

## 2017-11-14 RX ADMIN — NICOTINE POLACRILEX 2 MG: 2 GUM, CHEWING BUCCAL at 17:18

## 2017-11-14 RX ADMIN — TRAMADOL HYDROCHLORIDE 50 MG: 50 TABLET, FILM COATED ORAL at 20:29

## 2017-11-14 ASSESSMENT — ENCOUNTER SYMPTOMS
WHEEZING: 0
ABDOMINAL PAIN: 0
SHORTNESS OF BREATH: 1
VOMITING: 0
COLOR CHANGE: 0
COUGH: 1
STRIDOR: 0
NAUSEA: 0
DIARRHEA: 0
CONSTIPATION: 0
SORE THROAT: 0
RHINORRHEA: 0
CHOKING: 0
EYE DISCHARGE: 0
ABDOMINAL DISTENTION: 0
BACK PAIN: 0

## 2017-11-14 ASSESSMENT — PAIN SCALES - GENERAL: PAINLEVEL_OUTOF10: 5

## 2017-11-14 ASSESSMENT — PULMONARY FUNCTION TESTS: PEFR_L/MIN: 100

## 2017-11-14 NOTE — H&P
[facility]  MEDICINE    HISTORY AND PHYSICAL EXAM    PATIENT NAME:  Jose Gibbs    MRN:  [id number]  SERVICE DATE:  11/14/2017   SERVICE TIME:  1:11 PM    Primary Care Physician: Dk Rivera MD         SUBJECTIVE  CHIEF COMPLAINT:  SOB    HPI:  This is a 47 y.o. female who presents with SOB and harsh productive cough. Pt reports inability to express all phlegm. She denies CP, palp, abd pain, NVDFC. Pt completed 2 courses of azithromycin and steroid taper with no relief in sx. Pt is 1PPD smoker but has not smoked in 3 weeks d/t SOB. Pt administered IV CCS and aerosol tx in ED.     PAST MEDICAL HISTORY:    Past Medical History:   Diagnosis Date    Asthmatic bronchitis 2017    Chronic back pain greater than 3 months duration 2012    Dr Darlean Gosselin Depression 2010    Dr Ruff Knee [facility])    Family history of malignant neoplasm of breast     H/O vitamin D deficiency      PAST SURGICAL HISTORY:    Past Surgical History:   Procedure Laterality Date    BREAST CYST EXCISION Right 2000    benign    DILATION AND CURETTAGE OF UTERUS      TONSILLECTOMY       FAMILY HISTORY:    Family History   Problem Relation Age of Onset    Heart Failure Mother      dec 80    Osteoarthritis Mother     Cancer Mother 61     breast     Heart Attack Father      dec 58    Hypertension Father     High Cholesterol Father     Cancer Sister 64     pancreas, survived    Depression Sister      SOCIAL HISTORY:    Social History     Social History    Marital status: Single     Spouse name: N/A    Number of children: 2    Years of education: N/A     Occupational History    , now on [employer]      Social History Main Topics    Smoking status: Current Some Day Smoker     Packs/day: 1.00     Types: Cigarettes    Smokeless tobacco: Never Used      Comment: pt trying to cut back    Alcohol use 0.0 oz/week    Drug use: No    Sexual activity: Yes     Other Topics Concern    Not on file     Social History Narrative    Born in [facility] [place], one of 3 siblings    Single, 2 sons, in [place] and [address] in [facility] [place] alone    Worked as a  x 15 years    On Medicare for depression x 2011     MEDICATIONS:   Prior to Admission medications    Medication Sig Start Date End Date Taking? Authorizing Provider   INCRUSE ELLIPTA 62.5 MCG/INH AEPB inhale 1 puff by mouth daily 11/6/17  Yes Historical Provider, MD   tobramycin (TOBREX) 0.3 % ophthalmic solution Place 1 drop into the left eye every 4 hours for 7 days 11/8/17 11/15/17 Yes Daljit Nichols MD   benzonatate (TESSALON) 200 MG capsule Take 1 capsule by mouth 2 times daily as needed for Cough 11/6/17 11/16/17 Yes Daljit Nichols MD   albuterol sulfate HFA (PROAIR HFA) 108 (90 Base) MCG/ACT inhaler Inhale 2 puffs into the lungs every 6 hours as needed for Wheezing 10/16/17  Yes Daljit Nichols MD   FLUoxetine (PROZAC) 20 MG tablet Take 1 tablet by mouth daily 12/14/15  Yes Daljit Nichols MD   Vitamin D (CHOLECALCIFEROL) 1000 UNITS CAPS capsule Take 1,000 Units by mouth daily   Yes Historical Provider, MD   traMADol (ULTRAM) 50 MG tablet Take 50 mg by mouth every 6 hours as needed. 3/30/15  Yes Historical Provider, MD   Promethazine-Phenylephrine [facility]) 6.25-5 MG/5ML syrup Take 5 mLs by mouth 4 times daily as needed for Cough 11/6/17   Daljit Nichols MD   umeclidinium-vilanterol (ANORO ELLIPTA) 62.5-25 MCG/INH AEPB inhaler Inhale 1 puff into the lungs daily 11/6/17   Daljit Nichols MD   Loratadine (CLARITIN) 10 MG CAPS Take 1 capsule by mouth daily    Historical Provider, MD       ALLERGIES: Morphine; Abilify [aripiprazole]; and Effexor [venlafaxine]    REVIEW OF SYSTEM:   ROS as noted in HPI, 12 point ROS reviewed and otherwise negative.     OBJECTIVE  PHYSICAL EXAM: BP (!) 154/113   Pulse 100   Temp 97.5 °F (36.4 °C) (Oral)   Resp 17   Ht 5' 3\" (1.6 m)   Wt 175 lb (79.4 kg)   LMP  (LMP Unknown)   SpO2 93%   BMI 31.00 kg/m² CONSTITUTIONAL:  alert and no apparent distress  EYES:  pupils equal, round and reactive to light, sclera clear and conjunctiva normal  ENT:  normocepalic, without obvious abnormality, atraumatic  NECK:  supple, symmetrical, trachea midline and skin normal  LUNGS:  Harsh cough, diffuse rhonchi bilaterally   CARDIOVASCULAR:  normal apical pulses and normal S1 and S2  ABDOMEN:  normal bowel sounds and non-tender  MUSCULOSKELETAL:  there is no redness, warmth, or swelling of the joints  full range of motion noted  NEUROLOGIC:  Mental Status Exam:  Level of Alertness:   awake  Orientation:   person, place, time  SKIN:  normal skin color, texture, turgor    DATA:     Diagnostic tests reviewed for today's visit:    Most recent labs and imaging results reviewed.      LABS:    Recent Results (from the past 24 hour(s))   Comprehensive Metabolic Panel    Collection Time: 11/14/17 11:15 AM   Result Value Ref Range    Sodium 141 132 - 144 mEq/L    Potassium 4.1 3.5 - 5.1 mEq/L    Chloride 101 98 - 107 mEq/L    CO2 23 22 - 29 mEq/L    Anion Gap 17 (H) 7 - 13 mEq/L    Glucose 117 (H) 74 - 109 mg/dL    BUN 10 6 - 20 mg/dL    CREATININE 0.39 (L) 0.50 - 0.90 mg/dL    GFR Non-African American >60.0 >60    GFR  >60.0 >60    Calcium 9.6 8.6 - 10.2 mg/dL    Total Protein 7.8 6.4 - 8.1 g/dL    Alb 4.7 3.9 - 4.9 g/dL    Total Bilirubin 0.3 0.0 - 1.2 mg/dL    Alkaline Phosphatase 82 40 - 130 U/L    ALT 17 0 - 33 U/L    AST 19 0 - 35 U/L    Globulin 3.1 2.3 - 3.5 g/dL   CBC    Collection Time: 11/14/17 11:15 AM   Result Value Ref Range    WBC 12.7 (H) 4.8 - 10.8 K/uL    RBC 4.84 4.20 - 5.40 M/uL    Hemoglobin 15.1 12.0 - 16.0 g/dL    Hematocrit 45.1 37.0 - 47.0 %    MCV 93.1 82.0 - 100.0 fL    MCH 31.1 27.0 - 31.3 pg    MCHC 33.4 33.0 - 37.0 %    RDW 13.4 11.5 - 14.5 %    Platelets 447 905 - 328 K/uL   Troponin    Collection Time: 11/14/17 11:15 AM   Result Value Ref Range    Troponin <0.010 0.000 - 0.010 ng/mL   Brain

## 2017-11-14 NOTE — FLOWSHEET NOTE
To room 263 from ER, up to  Void with steady gait. Dr Michael Quevedo and Dr Isaac Mccall in to see patient, orders received.

## 2017-11-14 NOTE — PROGRESS NOTES
[facility] AT [place] Respiratory Therapy Evaluation   Current Order: Q4 duoneb wa  Home Regimen: prn albuterol     Ordering Physician:Soniya  Re-evaluation Date: 11/17    Diagnosis:COPD    Patient Status: Stable / Unstable + Physician notified    The following MDI Criteria must be met in order to convert aerosol to MDI with spacer. If unable to meet, MDI will be converted to aerosol:  []  Patient able to demonstrate the ability to use MDI effectively  []  Patient alert and cooperative  []  Patient able to take deep breath with 5-10 second hold  []  Medication(s) available in this delivery method   []  Peak flow greater than or equal to 200 ml/min            Current Order Substituted To  (same drug, same frequency)   Aerosol to MDI [] Albuterol Sulfate 0.083% unit dose by aerosol Albuterol Sulfate MDI 2 puffs by inhalation with spacer    [] Levalbuterol 1.25 mg unit dose by aerosol Levalbuterol MDI 2 puffs by inhalation with spacer    [] Levalbuterol 0.63 mg unit dose by aerosol Levalbuterol MDI 2 puffs by inhalation with spacer    [] Ipratropium Bromide 0.02% unit dose by aerosol Ipratropium Bromide MDI 2 puffs by inhalation with spacer    [] Duoneb (Ipratropium + Albuterol) unit dose by aerosol Ipratropium MDI + Albuterol MDI 2 puffs by inhalation w/spacer   MDI to Aerosol [] Albuterol Sulfate MDI Albuterol Sulfate 0.083% unit dose by aerosol    [] Levalbuterol MDI 2 puffs by inhalation Levalbuterol 1.25 mg unit dose by aerosol    [] Ipratropium Bromide MDI by inhalation Ipratropium Bromide 0.02% unit dose by aerosol    [] Combivent (Ipratropium + Albuterol) MDI by inhalation Duoneb (Ipratropium + Albuterol) unit dose by aerosol   Treatment Assessment [Frequency/Schedule]:  Change frequency to: ____TID duoneb______________________________________________per Protocol, P&T, MEC      Points 0 1 2 3 4   Pulmonary Status  Non-Smoker  []   Smoking history   < 20 pack years  []   Smoking history  ?  20 pack years  []   Pulmonary Disorder  (acute or chronic)  [x]   Severe or Chronic w/ Exacerbation  []     Surgical Status No [x]   Surgeries     General []   Surgery Lower []   Abdominal Thoracic or []   Upper Abdominal Thoracic with  PulmonaryDisorder  []     Chest X-ray Clear/Not  Ordered     [x]  Chronic Changes  Results Pending  []  Infiltrates, atelectasis, pleural effusion, or edema  []  Infiltrates in more than one lobe []  Infiltrate + Atelectasis, &/or pleural effusion  []    Respiratory Pattern Regular,  RR = 12-20 [x]  Increased,  RR = 21-25 []  ANGULO, irregular,  or RR = 26-30 []  Decreased FEV1  or RR = 31-35 []  Severe SOB, use  of accessory muscles, or RR ? 35  []    Mental Status Alert, oriented,  Cooperative [x]  Confused but Follows commands []  Lethargic or unable to follow commands []  Obtunded  []  Comatose  []    Breath Sounds Clear to  auscultation  []  Decreased unilaterally or  in bases only []  Decreased  bilaterally  []  Crackles or intermittent wheezes [x]  Wheezes []    Cough Strong, Spontan., & nonproductive []  Strong,  spontaneous, &  productive [x]  Weak,  Nonproductive []  Weak, productive or  with wheezes []  No spontaneous  cough or may require suctioning []    Level of Activity Ambulatory [x]  Ambulatory w/ Assist  []  Non-ambulatory []  Paraplegic []  Quadriplegic []    Total    Score:___7____     Triage Score:___4_____      Tri       Triage:     1. (>20) Freq: Q3    2. (16-20) Freq: Q4   3. (11-15) Freq: QID & Albuterol Q2 PRN    4. (6-10) Freq: TID & Albuterol Q2 PRN    5. (0-5) Freq Q4prn

## 2017-11-14 NOTE — ED PROVIDER NOTES
[address] [facility] ED  eMERGENCY dEPARTMENT eNCOUnter      Pt Name: Baudilio Callejas  MRN: [id number]  [place] 1965  Date of evaluation: 11/14/2017  Provider: Radha Perdomo PA-C    CHIEF COMPLAINT       Chief Complaint   Patient presents with    Shortness of Breath     pt c/o sob with exertion and cough for the past month         HISTORY OF PRESENT ILLNESS   (Location/Symptom, Timing/Onset, Context/Setting, Quality, Duration, Modifying Factors, Severity)  Note limiting factors. Baudilio Callejas is a 47 y.o. female who presents to the emergency department With a complaint of cough and shortness of breath. Patient states been ongoing for approximately last 4 weeks. Patient states she was seen by her family doctor and treated twice with Zithromax as a bronchitis, she states Dr. Angel Gross was concerned that she has COPD due to her ongoing smoking, but had scheduled outpatient testing for evaluation. Patient states that she never felt well enough to complete the testing so was never done. She states her shortness of breath and cough is progressively gotten worse she's gotten to point she can only ablated about 30 feet or so before she stopped catch her breath. She states her cough is productive for clear sputum there's been no fevers no chest pain no nausea vomiting no dizziness. She spoke with Dr. Angel Gross again today advised her to come to the emergency department for further evaluation. Patient states she had stopped tobacco use approximately 3 weeks ago she states she was a one pack-a-day smoker for approximately 40 years. HPI    Nursing Notes were reviewed. REVIEW OF SYSTEMS    (2-9 systems for level 4, 10 or more for level 5)     Review of Systems   Constitutional: Negative for activity change and appetite change. HENT: Negative for congestion, ear discharge, ear pain, nosebleeds, rhinorrhea and sore throat. Eyes: Negative for discharge.    Respiratory: Positive for cough and 50 MG TABLET    Take 50 mg by mouth every 6 hours as needed. UMECLIDINIUM-VILANTEROL (ANORO ELLIPTA) 62.5-25 MCG/INH AEPB INHALER    Inhale 1 puff into the lungs daily    VITAMIN D (CHOLECALCIFEROL) 1000 UNITS CAPS CAPSULE    Take 1,000 Units by mouth daily       ALLERGIES     Morphine; Abilify [aripiprazole]; and Effexor [venlafaxine]    FAMILY HISTORY       Family History   Problem Relation Age of Onset    Heart Failure Mother      dec 80    Osteoarthritis Mother     Cancer Mother 61     breast     Heart Attack Father      dec 58    Hypertension Father     High Cholesterol Father     Cancer Sister 64     pancreas, survived    Depression Sister           SOCIAL HISTORY       Social History     Social History    Marital status: Single     Spouse name: N/A    Number of children: 2    Years of education: N/A     Occupational History    , now on SS      Social History Main Topics    Smoking status: Current Some Day Smoker     Packs/day: 0.25     Types: Cigarettes    Smokeless tobacco: Never Used      Comment: pt trying to cut back    Alcohol use 0.0 oz/week    Drug use: No    Sexual activity: Yes     Other Topics Concern    None     Social History Narrative    Born in [facility], one of 3 siblings    Single, 2 sons, in [place] and [address] [place] [address] Row in a [facility] home in [facility] alone    Worked as a  x 15 years    On Medicare for depression x 2011       SCREENINGS             PHYSICAL EXAM    (up to 7 for level 4, 8 or more for level 5)     ED Triage Vitals [11/14/17 1053]   BP Temp Temp Source Pulse Resp SpO2 Height Weight   (!) 168/93 97.5 °F (36.4 °C) Oral 108 18 95 % 5' 3\" (1.6 m) 175 lb (79.4 kg)       Physical Exam   Constitutional: She is oriented to person, place, and time. She appears well-developed and well-nourished. HENT:   Head: Normocephalic. Eyes: Pupils are equal, round, and reactive to light. Neck: Normal range of motion. Neck supple. No JVD present.  No tracheal deviation present. Cardiovascular: Normal rate. Pulmonary/Chest: Effort normal. No respiratory distress. She has no wheezes. She has no rales. She exhibits no tenderness. Lung sounds are very diminished in all fields there is no expiratory wheezes there is no rales or rhonchi   Abdominal: Soft. She exhibits no distension and no mass. There is no tenderness. There is no rebound and no guarding. Musculoskeletal: She exhibits no edema or deformity. Neurological: She is alert and oriented to person, place, and time. Coordination normal.   Skin: Skin is warm and dry. Psychiatric: She has a normal mood and affect. DIAGNOSTIC RESULTS     EKG: All EKG's are interpreted by the Emergency Department Physician who either signs or Co-signs this chart in the absence of a cardiologist.    EKG shows normal sinus rhythm at 84 bpm there is no acute ST segment abnormality there is no ectopy. RADIOLOGY:   Non-plain film images such as CT, Ultrasound and MRI are read by the radiologist. Plain radiographic images are visualized and preliminarily interpreted by the emergency physician with the below findings:    Chest x-ray shows no acute pulmonary process. Interpretation per the Radiologist below, if available at the time of this note:    XR Chest Portable    (Results Pending)         ED BEDSIDE ULTRASOUND:   Performed by ED Physician - none    LABS:  Labs Reviewed   COMPREHENSIVE METABOLIC PANEL - Abnormal; Notable for the following:        Result Value    Anion Gap 17 (*)     Glucose 117 (*)     CREATININE 0.39 (*)     All other components within normal limits   CBC - Abnormal; Notable for the following:     WBC 12.7 (*)     All other components within normal limits   CULTURE BLOOD #1   CULTURE BLOOD #2   TROPONIN   BRAIN NATRIURETIC PEPTIDE   PROTIME-INR   APTT   CK   URINE RT REFLEX TO CULTURE       All other labs were within normal range or not returned as of this dictation.     EMERGENCY DEPARTMENT

## 2017-11-14 NOTE — CONSULTS
Consults   Pulmonary Medicine  Consult Note      Reason for consultation:copd exacerbation    Consulting physician: Yecenia Fox     HISTORY OF PRESENT ILLNESS:    This is 60-year-old female who was presented with increased shortness of breath and  harsh productive cough. She states she has been sick for last 3-4 weeks. She was seen by primary care physician Dr. Manjeet Munoz and she was given 2 courses of Z-Eloy and now taper dose of steroid without any significant improvements. She said she only felt better for 2 days and then got worse again. She said she has been smoking 1 pack a day but she has not smoked for last 3 weeks due to she has been sick. She was going to have a PFT done to rule out COPD but  she has been sick so not able to do it. She has no fever or chills. no chest pain or pleuritic pain. no nausea vomiting diarrhea or abdominal pain. She is on Solu-Medrol 40 mg every 8 hourly and nebulizer with DuoNeb every 4 hourly while awake. Chest x-ray was done shows no active disease        Past Medical History:        Diagnosis Date    Asthmatic bronchitis 2017    Chronic back pain greater than 3 months duration 2012    Dr Coleman Betancur Depression 2010    [facility]    Family history of malignant neoplasm of breast     H/O vitamin D deficiency        Past Surgical History:        Procedure Laterality Date    BREAST CYST EXCISION Right 2000    benign    DILATION AND CURETTAGE OF UTERUS      TONSILLECTOMY         Social History:     reports that she has been smoking Cigarettes. She has been smoking about 1.00 pack per day. She has never used smokeless tobacco. She reports that she drinks alcohol. She reports that she does not use drugs.     Family History:       Problem Relation Age of Onset    Heart Failure Mother      dec 80    Osteoarthritis Mother     Cancer Mother 61     breast     Heart Attack Father      dec 58    Hypertension Father     High Cholesterol Father     Cancer Sister

## 2017-11-15 LAB
ANION GAP SERPL CALCULATED.3IONS-SCNC: 14 MEQ/L (ref 7–13)
BUN BLDV-MCNC: 17 MG/DL (ref 6–20)
CALCIUM SERPL-MCNC: 9.4 MG/DL (ref 8.6–10.2)
CHLORIDE BLD-SCNC: 104 MEQ/L (ref 98–107)
CO2: 23 MEQ/L (ref 22–29)
CREAT SERPL-MCNC: 0.41 MG/DL (ref 0.5–0.9)
D DIMER: <0.19 MG/L FEU (ref 0–0.5)
GFR AFRICAN AMERICAN: >60
GFR NON-AFRICAN AMERICAN: >60
GLUCOSE BLD-MCNC: 145 MG/DL (ref 74–109)
HCT VFR BLD CALC: 40.9 % (ref 37–47)
HEMOGLOBIN: 13.8 G/DL (ref 12–16)
MAGNESIUM: 2.9 MG/DL (ref 1.7–2.3)
MCH RBC QN AUTO: 32.4 PG (ref 27–31.3)
MCHC RBC AUTO-ENTMCNC: 33.8 % (ref 33–37)
MCV RBC AUTO: 95.7 FL (ref 82–100)
PDW BLD-RTO: 13.5 % (ref 11.5–14.5)
PLATELET # BLD: 333 K/UL (ref 130–400)
POTASSIUM SERPL-SCNC: 4.5 MEQ/L (ref 3.5–5.1)
RBC # BLD: 4.27 M/UL (ref 4.2–5.4)
SODIUM BLD-SCNC: 141 MEQ/L (ref 132–144)
WBC # BLD: 22 K/UL (ref 4.8–10.8)

## 2017-11-15 PROCEDURE — 2700000000 HC OXYGEN THERAPY PER DAY

## 2017-11-15 PROCEDURE — 85027 COMPLETE CBC AUTOMATED: CPT

## 2017-11-15 PROCEDURE — 6360000002 HC RX W HCPCS: Performed by: INTERNAL MEDICINE

## 2017-11-15 PROCEDURE — 6370000000 HC RX 637 (ALT 250 FOR IP): Performed by: INTERNAL MEDICINE

## 2017-11-15 PROCEDURE — 80048 BASIC METABOLIC PNL TOTAL CA: CPT

## 2017-11-15 PROCEDURE — 6370000000 HC RX 637 (ALT 250 FOR IP): Performed by: PHYSICIAN ASSISTANT

## 2017-11-15 PROCEDURE — 99232 SBSQ HOSP IP/OBS MODERATE 35: CPT | Performed by: INTERNAL MEDICINE

## 2017-11-15 PROCEDURE — 83735 ASSAY OF MAGNESIUM: CPT

## 2017-11-15 PROCEDURE — 2580000003 HC RX 258: Performed by: PHYSICIAN ASSISTANT

## 2017-11-15 PROCEDURE — 94640 AIRWAY INHALATION TREATMENT: CPT

## 2017-11-15 PROCEDURE — 36415 COLL VENOUS BLD VENIPUNCTURE: CPT

## 2017-11-15 PROCEDURE — 6360000002 HC RX W HCPCS: Performed by: PHYSICIAN ASSISTANT

## 2017-11-15 PROCEDURE — 85379 FIBRIN DEGRADATION QUANT: CPT

## 2017-11-15 PROCEDURE — 1210000000 HC MED SURG R&B

## 2017-11-15 RX ORDER — LEVALBUTEROL 1.25 MG/.5ML
1.25 SOLUTION, CONCENTRATE RESPIRATORY (INHALATION) EVERY 6 HOURS PRN
Status: DISCONTINUED | OUTPATIENT
Start: 2017-11-15 | End: 2017-11-16

## 2017-11-15 RX ORDER — TIZANIDINE 4 MG/1
4 TABLET ORAL EVERY 6 HOURS PRN
Status: DISCONTINUED | OUTPATIENT
Start: 2017-11-15 | End: 2017-11-18 | Stop reason: HOSPADM

## 2017-11-15 RX ADMIN — Medication 10 ML: at 10:45

## 2017-11-15 RX ADMIN — LEVOFLOXACIN 500 MG: 5 INJECTION, SOLUTION INTRAVENOUS at 16:13

## 2017-11-15 RX ADMIN — METHYLPREDNISOLONE SODIUM SUCCINATE 40 MG: 40 INJECTION, POWDER, FOR SOLUTION INTRAMUSCULAR; INTRAVENOUS at 06:56

## 2017-11-15 RX ADMIN — METHYLPREDNISOLONE SODIUM SUCCINATE 40 MG: 40 INJECTION, POWDER, FOR SOLUTION INTRAMUSCULAR; INTRAVENOUS at 16:14

## 2017-11-15 RX ADMIN — IPRATROPIUM BROMIDE AND ALBUTEROL SULFATE 1 AMPULE: .5; 3 SOLUTION RESPIRATORY (INHALATION) at 07:00

## 2017-11-15 RX ADMIN — NICOTINE POLACRILEX 2 MG: 2 GUM, CHEWING BUCCAL at 16:13

## 2017-11-15 RX ADMIN — TRAMADOL HYDROCHLORIDE 50 MG: 50 TABLET, FILM COATED ORAL at 16:13

## 2017-11-15 RX ADMIN — TRAMADOL HYDROCHLORIDE 50 MG: 50 TABLET, FILM COATED ORAL at 06:56

## 2017-11-15 RX ADMIN — LEVALBUTEROL 1.25 MG: 1.25 SOLUTION, CONCENTRATE RESPIRATORY (INHALATION) at 19:10

## 2017-11-15 RX ADMIN — ENOXAPARIN SODIUM 40 MG: 40 INJECTION, SOLUTION INTRAVENOUS; SUBCUTANEOUS at 10:43

## 2017-11-15 RX ADMIN — TOBRAMYCIN 1 DROP: 3 SOLUTION OPHTHALMIC at 10:42

## 2017-11-15 RX ADMIN — GUAIFENESIN 600 MG: 600 TABLET, EXTENDED RELEASE ORAL at 22:45

## 2017-11-15 RX ADMIN — TOBRAMYCIN 1 DROP: 3 SOLUTION OPHTHALMIC at 16:17

## 2017-11-15 RX ADMIN — TOBRAMYCIN 1 DROP: 3 SOLUTION OPHTHALMIC at 06:55

## 2017-11-15 RX ADMIN — Medication 10 ML: at 22:46

## 2017-11-15 RX ADMIN — TOBRAMYCIN 1 DROP: 3 SOLUTION OPHTHALMIC at 22:45

## 2017-11-15 RX ADMIN — METHYLPREDNISOLONE SODIUM SUCCINATE 40 MG: 40 INJECTION, POWDER, FOR SOLUTION INTRAMUSCULAR; INTRAVENOUS at 22:45

## 2017-11-15 RX ADMIN — TIZANIDINE 4 MG: 4 TABLET ORAL at 22:45

## 2017-11-15 RX ADMIN — LEVALBUTEROL 1.25 MG: 1.25 SOLUTION, CONCENTRATE RESPIRATORY (INHALATION) at 12:00

## 2017-11-15 RX ADMIN — VITAMIN D, TAB 1000IU (100/BT) 1000 UNITS: 25 TAB at 16:13

## 2017-11-15 RX ADMIN — GUAIFENESIN 600 MG: 600 TABLET, EXTENDED RELEASE ORAL at 10:42

## 2017-11-15 RX ADMIN — FLUOXETINE 20 MG: 20 CAPSULE ORAL at 10:42

## 2017-11-15 RX ADMIN — ACETAMINOPHEN 650 MG: 325 TABLET ORAL at 12:22

## 2017-11-15 ASSESSMENT — PAIN SCALES - GENERAL
PAINLEVEL_OUTOF10: 5

## 2017-11-15 NOTE — PROGRESS NOTES
INPATIENT PROGRESS NOTES    PATIENT NAME: Jose Manuel Rodriguez  MRN: [id number]  SERVICE DATE:  November 15, 2017   SERVICE TIME:  11:18 AM      PRIMARY SERVICE: Pulmonary Disease    INTERVAL HPI: Patient seen and examined at bedside, Interval Notes, orders reviewed. Nursing notes noted  Patient is still wheezing. Not feeling well. She still short of breath. No chest pain or pleuritic pain no nausea vomiting diarrhea or abdominal.  She has no fever or chills. She has a tachycardia with heart rate goes up to 120. OBJECTIVE    Body mass index is 31 kg/m². PHYSICAL EXAM:  Vitals:  /78   Pulse 89   Temp 97.7 °F (36.5 °C) (Oral)   Resp 17   Ht 5' 3\" (1.6 m)   Wt 175 lb (79.4 kg)   LMP  (LMP Unknown)   SpO2 97%   BMI 31.00 kg/m²   General: Alert, awake . comfortable in bed, No distress. Head: Atraumatic , Normocephalic   Eyes: PERRL. No sclera icterus. No conjunctival injection. No discharge   ENT: No nasal  discharge. Pharynx clear. Neck:  Trachea midline. No thyromegaly, no JVD, No cervical adenopathy. Chest : Bilaterally symmetrical ,Normal effort,  No accessory muscle use  Lung : . Fair BS bilateral, decreased BS at bases. No Rales. Faint expiratory wheezing. No rhonchi. No dullness on percussion. Heart[de-identified] Normal  rate. Regular rhythm. No mumur ,  Rub or gallop  ABD: Non-tender. Non-distended. No masses. No organmegaly. Normal bowel sounds. No hernia.   EXT: No Pitting both leg , No Cyanosis No clubbing  Neuro: no focal weakness        DATA:   Recent Labs      11/14/17   1115  11/15/17   0522   WBC  12.7*  22.0*   HGB  15.1  13.8   HCT  45.1  40.9   MCV  93.1  95.7   PLT  358  333     Recent Labs      11/14/17   1115  11/15/17   0522   NA  141  141   K  4.1  4.5   CL  101  104   CO2  23  23   BUN  10  17   CREATININE  0.39*  0.41*   GLUCOSE  117*  145*   CALCIUM  9.6  9.4   PROT  7.8   --    LABALBU  4.7   --    BILITOT  0.3   --    ALKPHOS  82   --    AST  19   --    ALT  17   --    LABGLOM >60.0  >60.0   GFRAA  >60.0  >60.0   GLOB  3.1   --        O2 Device: Nasal cannula  O2 Flow Rate (L/min): 2 L/min    DIET GENERAL;     MEDICATIONS during current hospitalization:    Continuous Infusions:     Scheduled Meds:   sodium chloride flush  10 mL Intravenous 2 times per day    enoxaparin  40 mg Subcutaneous Daily    methylPREDNISolone  40 mg Intravenous Q8H    guaiFENesin  600 mg Oral BID    FLUoxetine  20 mg Oral Daily    tobramycin  1 drop Left Eye Q4H    vitamin D  1,000 Units Oral Daily    levofloxacin  500 mg Intravenous Q24H    nicotine  1 patch Transdermal Daily    ipratropium-albuterol  1 ampule Inhalation TID       PRN Meds:tiZANidine, sodium chloride flush, acetaminophen, magnesium hydroxide, ondansetron, benzonatate, traMADol, nicotine polacrilex, albuterol    Radiology  Xr Chest Standard (2 Vw)    Result Date: 10/16/2017  EXAMINATION: XR CHEST STANDARD TWO VW REASON FOR EXAM:J45.41 Moderate persistent asthmatic bronchitis with acute exacerbation ICD10 FINDINGS: Plain films reveal heart and mediastinal structures within normal limits and the lung fields free of active disease. Bones and soft tissues intact. NO ACTIVE DISEASE IN THE CHEST. Xr Chest Portable    Result Date: 11/14/2017  EXAM: PORTABLE CHEST COMPARISON: 10/16/2017 REASON FOR EXAMINATION: UPPER RESPIRATORY CONGESTION AND DYSPNEA X4 WEEKS FINDINGS: Portable chest x-ray demonstrates normal appearance of the heart and mediastinum. The lungs appear clear. The visualized bony thorax and remainder of the chest appears unremarkable. NO EVIDENCE OF ACTIVE DISEASE IN THE CHEST. IMPRESSION AND SUGGESTION:  1. COPD exacerbation  2. Acute bronchitis  3. Chronic smoking tobacco abuse  4. Depression     Continue IV Solu-Medrol 40 mg every 8 hourly, neb treatment with DuoNeb every 4 hourly while awake. Change it to Xopenex 1.25 mg every 8 hourly due to tachycardia. Continue  Mucinex 600 mg twice a day.   Levaquin

## 2017-11-15 NOTE — PROGRESS NOTES
Department of Internal Medicine   Progress Note      SUBJECTIVE:  Still short of breath. All other systems reviewed and are negative    OBJECTIVE      Medications    Current Facility-Administered Medications: tiZANidine (ZANAFLEX) tablet 4 mg, 4 mg, Oral, Q6H PRN  levalbuterol (XOPENEX) nebulizer solution 1.25 mg, 1.25 mg, Nebulization, Q6H PRN  sodium chloride flush 0.9 % injection 10 mL, 10 mL, Intravenous, 2 times per day  sodium chloride flush 0.9 % injection 10 mL, 10 mL, Intravenous, PRN  acetaminophen (TYLENOL) tablet 650 mg, 650 mg, Oral, Q4H PRN  magnesium hydroxide (MILK OF MAGNESIA) 400 MG/5ML suspension 30 mL, 30 mL, Oral, Daily PRN  ondansetron (ZOFRAN) injection 4 mg, 4 mg, Intravenous, Q6H PRN  enoxaparin (LOVENOX) injection 40 mg, 40 mg, Subcutaneous, Daily  methylPREDNISolone sodium (SOLU-MEDROL) injection 40 mg, 40 mg, Intravenous, Q8H  benzonatate (TESSALON) capsule 100 mg, 100 mg, Oral, TID PRN  guaiFENesin (MUCINEX) extended release tablet 600 mg, 600 mg, Oral, BID  FLUoxetine (PROZAC) capsule 20 mg, 20 mg, Oral, Daily  tobramycin (TOBREX) 0.3 % ophthalmic solution 1 drop, 1 drop, Left Eye, Q4H  traMADol (ULTRAM) tablet 50 mg, 50 mg, Oral, Q6H PRN  vitamin D (CHOLECALCIFEROL) tablet 1,000 Units, 1,000 Units, Oral, Daily  nicotine polacrilex (NICORETTE) gum 2 mg, 2 mg, Oral, PRN  levofloxacin (LEVAQUIN) 500 MG/100ML infusion 500 mg, 500 mg, Intravenous, Q24H  nicotine (NICODERM CQ) 14 MG/24HR 1 patch, 1 patch, Transdermal, Daily  albuterol (PROVENTIL) nebulizer solution 2.5 mg, 2.5 mg, Nebulization, Q2H PRN  Physical    VITALS:  /75   Pulse 81   Temp 97.7 °F (36.5 °C) (Oral)   Resp 16   Ht 5' 3\" (1.6 m)   Wt 175 lb (79.4 kg)   LMP  (LMP Unknown)   SpO2 94%   BMI 31.00 kg/m²   TEMPERATURE:  Current - Temp: 97.7 °F (36.5 °C);  Max - Temp  Av.9 °F (36.6 °C)  Min: 97.7 °F (36.5 °C)  Max: 98.1 °F (36.7 °C)  RESPIRATIONS RANGE: Resp  Av.4  Min: 16  Max: 17  PULSE RANGE: Pulse Av.1  Min: 81  Max: 103  BLOOD PRESSURE RANGE:  Systolic (35MHU), ORN:606 , Min:115 , THELMA:682   ; Diastolic (01XAP), TZZ:24, Min:75, Max:79    PULSE OXIMETRY RANGE: SpO2  Av.9 %  Min: 93 %  Max: 97 %  24HR INTAKE/OUTPUT:    Intake/Output Summary (Last 24 hours) at 11/15/17 1654  Last data filed at 11/15/17 1045   Gross per 24 hour   Intake               10 ml   Output                0 ml   Net               10 ml     CONSTITUTIONAL:  alert and no apparent distress  EYES:  pupils equal, round and reactive to light, sclera clear and conjunctiva normal  ENT:  normocepalic, without obvious abnormality, atraumatic  NECK:  supple, symmetrical, trachea midline and skin normal  LUNGS:  Harsh cough, diffuse rhonchi bilaterally   CARDIOVASCULAR:  normal apical pulses and normal S1 and S2  ABDOMEN:  normal bowel sounds and non-tender  MUSCULOSKELETAL:  there is no redness, warmth, or swelling of the joints  full range of motion noted  NEUROLOGIC:  Mental Status Exam:  Level of Alertness:   awake  Orientation:   person, place, time  SKIN:  normal skin color, texture, turgor  Data    LABS  Recent Labs      17   1115  11/15/17   0522   WBC  12.7*  22.0*   RBC  4.84  4.27   HGB  15.1  13.8   HCT  45.1  40.9   MCV  93.1  95.7   MCH  31.1  32.4*   MCHC  33.4  33.8   RDW  13.4  13.5   PLT  358  333       Recent Labs      17   1115  11/15/17   0522   NA  141  141   K  4.1  4.5   CL  101  104   CO2  23  23   BUN  10  17   CREATININE  0.39*  0.41*   GLUCOSE  117*  145*   CALCIUM  9.6  9.4       Recent Labs      11/15/17   0522   MG  2.9*         ASSESSMENT AND PLAN    Active Hospital Problems    Diagnosis Date Noted    COPD (chronic obstructive pulmonary disease) with acute bronchitis ([facility] [J44.0, J20.9] 2017    Tobacco abuse [Z72.0] 2017    Major depressive disorder, recurrent episode, in partial remission ([facility] [F33.41] 09/15/2015         - Levaquin, solumedrol, albuterol and duonebs  - Nicotine

## 2017-11-15 NOTE — FLOWSHEET NOTE
Pt was up quietly watching TV most of the evening, no complaints.  Requested I contact  For Ángel Ruiz which she takes at night

## 2017-11-15 NOTE — CARE COORDINATION
MET WITH PATIENT TO DISCUSS D/C PLANS. SHE STATES SHE IS FROM HOME ALONE. SHE STILL DRIVES. SHE IS INDEPENDENT IN HER CARE. COPD TRIAL COMPLETED. PATIENT HAS BEEN USING HER GRANDDAUGHTERS NEBULIZER AT HOME, SO SHE WOULD BENEFIT FROM HAVING HER OWN. SHE WILL FOLLOW UP WITH DR. COVARRUIBAS ON D/C. SHE WILL BE A NEW PATIENT TO HIM. PATIENT MAY NEED HOME O2 EVAL PRIOR TO D/C AS WELL.  Electronically signed by Casey Brito RN on 11/15/2017 at 4:46 PM

## 2017-11-16 ENCOUNTER — APPOINTMENT (OUTPATIENT)
Dept: GENERAL RADIOLOGY | Age: 52
DRG: 192 | End: 2017-11-16
Payer: MEDICARE

## 2017-11-16 PROCEDURE — 1210000000 HC MED SURG R&B

## 2017-11-16 PROCEDURE — 71020 XR CHEST STANDARD TWO VW: CPT

## 2017-11-16 PROCEDURE — 6360000002 HC RX W HCPCS: Performed by: PHYSICIAN ASSISTANT

## 2017-11-16 PROCEDURE — 2700000000 HC OXYGEN THERAPY PER DAY

## 2017-11-16 PROCEDURE — 6370000000 HC RX 637 (ALT 250 FOR IP): Performed by: INTERNAL MEDICINE

## 2017-11-16 PROCEDURE — 6370000000 HC RX 637 (ALT 250 FOR IP): Performed by: PHYSICIAN ASSISTANT

## 2017-11-16 PROCEDURE — 2580000003 HC RX 258: Performed by: PHYSICIAN ASSISTANT

## 2017-11-16 PROCEDURE — 94667 MNPJ CHEST WALL 1ST: CPT

## 2017-11-16 PROCEDURE — 94664 DEMO&/EVAL PT USE INHALER: CPT

## 2017-11-16 PROCEDURE — 6360000002 HC RX W HCPCS: Performed by: INTERNAL MEDICINE

## 2017-11-16 PROCEDURE — 94668 MNPJ CHEST WALL SBSQ: CPT

## 2017-11-16 PROCEDURE — 99232 SBSQ HOSP IP/OBS MODERATE 35: CPT | Performed by: INTERNAL MEDICINE

## 2017-11-16 PROCEDURE — 94640 AIRWAY INHALATION TREATMENT: CPT

## 2017-11-16 RX ORDER — AZITHROMYCIN 250 MG/1
250 TABLET, FILM COATED ORAL DAILY
Status: COMPLETED | OUTPATIENT
Start: 2017-11-16 | End: 2017-11-18

## 2017-11-16 RX ORDER — CALCIUM CARBONATE 200(500)MG
500 TABLET,CHEWABLE ORAL 3 TIMES DAILY PRN
Status: DISCONTINUED | OUTPATIENT
Start: 2017-11-16 | End: 2017-11-18 | Stop reason: HOSPADM

## 2017-11-16 RX ORDER — LEVALBUTEROL 1.25 MG/.5ML
1.25 SOLUTION, CONCENTRATE RESPIRATORY (INHALATION) 3 TIMES DAILY
Status: DISCONTINUED | OUTPATIENT
Start: 2017-11-16 | End: 2017-11-18 | Stop reason: HOSPADM

## 2017-11-16 RX ORDER — SODIUM CHLORIDE FOR INHALATION 0.9 %
3 VIAL, NEBULIZER (ML) INHALATION
Status: DISCONTINUED | OUTPATIENT
Start: 2017-11-16 | End: 2017-11-18 | Stop reason: HOSPADM

## 2017-11-16 RX ORDER — ALBUTEROL SULFATE 2.5 MG/3ML
2.5 SOLUTION RESPIRATORY (INHALATION) ONCE
Status: COMPLETED | OUTPATIENT
Start: 2017-11-16 | End: 2017-11-16

## 2017-11-16 RX ADMIN — TRAMADOL HYDROCHLORIDE 50 MG: 50 TABLET, FILM COATED ORAL at 10:57

## 2017-11-16 RX ADMIN — BENZONATATE 100 MG: 100 CAPSULE ORAL at 16:43

## 2017-11-16 RX ADMIN — GUAIFENESIN 600 MG: 600 TABLET, EXTENDED RELEASE ORAL at 21:45

## 2017-11-16 RX ADMIN — ALBUTEROL SULFATE 2.5 MG: 2.5 SOLUTION RESPIRATORY (INHALATION) at 11:15

## 2017-11-16 RX ADMIN — TRAMADOL HYDROCHLORIDE 50 MG: 50 TABLET, FILM COATED ORAL at 02:41

## 2017-11-16 RX ADMIN — METHYLPREDNISOLONE SODIUM SUCCINATE 40 MG: 40 INJECTION, POWDER, FOR SOLUTION INTRAMUSCULAR; INTRAVENOUS at 05:59

## 2017-11-16 RX ADMIN — TOBRAMYCIN 1 DROP: 3 SOLUTION OPHTHALMIC at 06:00

## 2017-11-16 RX ADMIN — NICOTINE POLACRILEX 2 MG: 2 GUM, CHEWING BUCCAL at 16:43

## 2017-11-16 RX ADMIN — TOBRAMYCIN 1 DROP: 3 SOLUTION OPHTHALMIC at 14:10

## 2017-11-16 RX ADMIN — GUAIFENESIN 600 MG: 600 TABLET, EXTENDED RELEASE ORAL at 10:57

## 2017-11-16 RX ADMIN — Medication 10 ML: at 11:09

## 2017-11-16 RX ADMIN — LEVALBUTEROL 1.25 MG: 1.25 SOLUTION, CONCENTRATE RESPIRATORY (INHALATION) at 14:28

## 2017-11-16 RX ADMIN — AZITHROMYCIN 250 MG: 250 TABLET, FILM COATED ORAL at 14:09

## 2017-11-16 RX ADMIN — METHYLPREDNISOLONE SODIUM SUCCINATE 40 MG: 40 INJECTION, POWDER, FOR SOLUTION INTRAMUSCULAR; INTRAVENOUS at 16:43

## 2017-11-16 RX ADMIN — FLUOXETINE 20 MG: 20 CAPSULE ORAL at 10:57

## 2017-11-16 RX ADMIN — ENOXAPARIN SODIUM 40 MG: 40 INJECTION, SOLUTION INTRAVENOUS; SUBCUTANEOUS at 11:07

## 2017-11-16 RX ADMIN — NICOTINE POLACRILEX 2 MG: 2 GUM, CHEWING BUCCAL at 10:57

## 2017-11-16 RX ADMIN — ANTACID TABLETS 500 MG: 500 TABLET, CHEWABLE ORAL at 21:45

## 2017-11-16 RX ADMIN — TRAMADOL HYDROCHLORIDE 50 MG: 50 TABLET, FILM COATED ORAL at 19:23

## 2017-11-16 RX ADMIN — METHYLPREDNISOLONE SODIUM SUCCINATE 40 MG: 40 INJECTION, POWDER, FOR SOLUTION INTRAMUSCULAR; INTRAVENOUS at 23:42

## 2017-11-16 RX ADMIN — BENZONATATE 100 MG: 100 CAPSULE ORAL at 06:00

## 2017-11-16 RX ADMIN — TOBRAMYCIN 1 DROP: 3 SOLUTION OPHTHALMIC at 16:44

## 2017-11-16 RX ADMIN — LEVALBUTEROL 1.25 MG: 1.25 SOLUTION, CONCENTRATE RESPIRATORY (INHALATION) at 07:44

## 2017-11-16 RX ADMIN — LEVALBUTEROL 1.25 MG: 1.25 SOLUTION, CONCENTRATE RESPIRATORY (INHALATION) at 21:21

## 2017-11-16 RX ADMIN — ISODIUM CHLORIDE 3 ML: 0.03 SOLUTION RESPIRATORY (INHALATION) at 11:15

## 2017-11-16 RX ADMIN — VITAMIN D, TAB 1000IU (100/BT) 1000 UNITS: 25 TAB at 10:57

## 2017-11-16 RX ADMIN — TIZANIDINE 4 MG: 4 TABLET ORAL at 21:45

## 2017-11-16 ASSESSMENT — PAIN DESCRIPTION - DESCRIPTORS: DESCRIPTORS: OTHER (COMMENT)

## 2017-11-16 ASSESSMENT — PAIN DESCRIPTION - PROGRESSION
CLINICAL_PROGRESSION: GRADUALLY WORSENING

## 2017-11-16 ASSESSMENT — PAIN DESCRIPTION - PAIN TYPE: TYPE: ACUTE PAIN

## 2017-11-16 ASSESSMENT — PAIN DESCRIPTION - FREQUENCY: FREQUENCY: CONTINUOUS

## 2017-11-16 ASSESSMENT — PAIN SCALES - GENERAL
PAINLEVEL_OUTOF10: 7
PAINLEVEL_OUTOF10: 6

## 2017-11-16 ASSESSMENT — PAIN DESCRIPTION - LOCATION: LOCATION: ABDOMEN

## 2017-11-16 NOTE — CONSULTS
Pulmonary Rehab info given and discussed.   Scheduled to start outpatient program on 11/27/17 @ 2pm.

## 2017-11-16 NOTE — PROGRESS NOTES
recurrent episode, in partial remission ([facility]) F33.41    Insomnia secondary to depression with anxiety F41.8, F51.05    Asthmatic bronchitis J45.909    COPD (chronic obstructive pulmonary disease) with acute bronchitis (HCC) J44.0, J20.9    Tobacco abuse Z72.0     - COPD exacerbation:steroids, breathing treatment, antibiotics  - Acute bronchitis: Zithromax  - Tobacco use: nicotine patch  - depression: continue home meds      Haven Parsons MD  Pager : [phone]

## 2017-11-16 NOTE — CARE COORDINATION
Patient from home alone. Patient to have a home oxygen eval prior to d/c as well as an order for home nebulizer treatments. Will leave not for respiratory physician to order prior to d/c. Patient at this time denies any other needs. c3 to follow. Per pulmonary no d/c today. Add humification, and chest vest. Patient is still wheezing.

## 2017-11-16 NOTE — PROGRESS NOTES
[facility] AT [place] Respiratory Therapy Evaluation   Current Order:  xopenex q6 prn      Home Regimen: n      Ordering Physician: Brendan Frank  Re-evaluation Date:  11/19     Diagnosis: copd      Patient Status: Stable / Unstable + Physician notified    The following MDI Criteria must be met in order to convert aerosol to MDI with spacer. If unable to meet, MDI will be converted to aerosol:  []  Patient able to demonstrate the ability to use MDI effectively  []  Patient alert and cooperative  []  Patient able to take deep breath with 5-10 second hold  []  Medication(s) available in this delivery method   []  Peak flow greater than or equal to 200 ml/min            Current Order Substituted To  (same drug, same frequency)   Aerosol to MDI [] Albuterol Sulfate 0.083% unit dose by aerosol Albuterol Sulfate MDI 2 puffs by inhalation with spacer    [] Levalbuterol 1.25 mg unit dose by aerosol Levalbuterol MDI 2 puffs by inhalation with spacer    [] Levalbuterol 0.63 mg unit dose by aerosol Levalbuterol MDI 2 puffs by inhalation with spacer    [] Ipratropium Bromide 0.02% unit dose by aerosol Ipratropium Bromide MDI 2 puffs by inhalation with spacer    [] Duoneb (Ipratropium + Albuterol) unit dose by aerosol Ipratropium MDI + Albuterol MDI 2 puffs by inhalation w/spacer   MDI to Aerosol [] Albuterol Sulfate MDI Albuterol Sulfate 0.083% unit dose by aerosol    [] Levalbuterol MDI 2 puffs by inhalation Levalbuterol 1.25 mg unit dose by aerosol    [] Ipratropium Bromide MDI by inhalation Ipratropium Bromide 0.02% unit dose by aerosol    [] Combivent (Ipratropium + Albuterol) MDI by inhalation Duoneb (Ipratropium + Albuterol) unit dose by aerosol   Treatment Assessment [Frequency/Schedule]:  Change frequency to: ______xopenex tid & q2prn____________________________________________per Protocol, P&T, MEC      Points 0 1 2 3 4   Pulmonary Status  Non-Smoker  []   Smoking history   < 20 pack years  []   Smoking history  ?  20 pack years  []

## 2017-11-16 NOTE — PROGRESS NOTES
10  17   CREATININE  0.39*  0.41*   GLUCOSE  117*  145*   CALCIUM  9.6  9.4   PROT  7.8   --    LABALBU  4.7   --    BILITOT  0.3   --    ALKPHOS  82   --    AST  19   --    ALT  17   --    LABGLOM  >60.0  >60.0   GFRAA  >60.0  >60.0   GLOB  3.1   --        MV Settings:          No results for input(s): PHART, XGI3YGU, PO2ART, EUL9EJG, BEART, F2OYACAC in the last 72 hours. O2 Device: Nasal cannula  O2 Flow Rate (L/min): 2 L/min    DIET GENERAL;     MEDICATIONS during current hospitalization:    Continuous Infusions:     Scheduled Meds:   levalbuterol  1.25 mg Nebulization TID    albuterol  2.5 mg Nebulization Once    influenza virus vaccine  0.5 mL Intramuscular Once    sodium chloride flush  10 mL Intravenous 2 times per day    enoxaparin  40 mg Subcutaneous Daily    methylPREDNISolone  40 mg Intravenous Q8H    guaiFENesin  600 mg Oral BID    FLUoxetine  20 mg Oral Daily    tobramycin  1 drop Left Eye Q4H    vitamin D  1,000 Units Oral Daily    levofloxacin  500 mg Intravenous Q24H    nicotine  1 patch Transdermal Daily       PRN Meds:sodium chloride nebulizer, tiZANidine, sodium chloride flush, acetaminophen, magnesium hydroxide, ondansetron, benzonatate, traMADol, nicotine polacrilex, albuterol    Radiology  Xr Chest Standard (2 Vw)    Result Date: 11/16/2017  EXAMINATION: XR CHEST STANDARD TWO VW CLINICAL HISTORY: Coughing and wheezing, onset this a.m. COMPARISONS: 11/14/2017 FINDINGS: Cardiac size and pulmonary vascularity are normal. The lungs are clear. There is no evidence of adenopathy. There is minimal spondylosis. NEGATIVE CHEST RADIOGRAPHS    Xr Chest Portable    Result Date: 11/14/2017  EXAM: PORTABLE CHEST COMPARISON: 10/16/2017 REASON FOR EXAMINATION: UPPER RESPIRATORY CONGESTION AND DYSPNEA X4 WEEKS FINDINGS: Portable chest x-ray demonstrates normal appearance of the heart and mediastinum. The lungs appear clear.   The visualized bony thorax and remainder of the chest appears unremarkable. NO EVIDENCE OF ACTIVE DISEASE IN THE CHEST. IMPRESSION AND SUGGESTION:  1. Acute exacerbation of COPD  2.  Acute bronchitis with retained airway secretions and severe bronchospasm  Continue current treatment, add saline frequently to help mobilizing secretions, add chest physiotherapy with the best, humidify oxygen            Electronically signed by Jacqueline Reynolds MD, FCCP on 11/16/2017 at 10:55 AM

## 2017-11-17 LAB
ANION GAP SERPL CALCULATED.3IONS-SCNC: 19 MEQ/L (ref 7–13)
ANISOCYTOSIS: ABNORMAL
BASOPHILS ABSOLUTE: 0 K/UL (ref 0–0.2)
BASOPHILS RELATIVE PERCENT: 0.1 %
BUN BLDV-MCNC: 17 MG/DL (ref 6–20)
CALCIUM SERPL-MCNC: 9.8 MG/DL (ref 8.6–10.2)
CHLORIDE BLD-SCNC: 99 MEQ/L (ref 98–107)
CO2: 22 MEQ/L (ref 22–29)
CREAT SERPL-MCNC: 0.66 MG/DL (ref 0.5–0.9)
EOSINOPHILS ABSOLUTE: 0 K/UL (ref 0–0.7)
EOSINOPHILS RELATIVE PERCENT: 0.3 %
GFR AFRICAN AMERICAN: >60
GFR NON-AFRICAN AMERICAN: >60
GLUCOSE BLD-MCNC: 232 MG/DL (ref 74–109)
HCT VFR BLD CALC: 44.7 % (ref 37–47)
HEMOGLOBIN: 14.6 G/DL (ref 12–16)
HYPOCHROMIA: 0
LYMPHOCYTES ABSOLUTE: 0.4 K/UL (ref 1–4.8)
LYMPHOCYTES RELATIVE PERCENT: 2 %
MACROCYTES: 0
MCH RBC QN AUTO: 31.6 PG (ref 27–31.3)
MCHC RBC AUTO-ENTMCNC: 32.6 % (ref 33–37)
MCV RBC AUTO: 96.9 FL (ref 82–100)
MICROCYTES: 0
MONOCYTES ABSOLUTE: 0.4 K/UL (ref 0.2–0.8)
MONOCYTES RELATIVE PERCENT: 1.9 %
NEUTROPHILS ABSOLUTE: 20.6 K/UL (ref 1.4–6.5)
NEUTROPHILS RELATIVE PERCENT: 96 %
PDW BLD-RTO: 13.7 % (ref 11.5–14.5)
PLATELET # BLD: 334 K/UL (ref 130–400)
PLATELET SLIDE REVIEW: ADEQUATE
POIKILOCYTES: 0
POLYCHROMASIA: 0
POTASSIUM SERPL-SCNC: 4.3 MEQ/L (ref 3.5–5.1)
RBC # BLD: 4.61 M/UL (ref 4.2–5.4)
SLIDE REVIEW: ABNORMAL
SODIUM BLD-SCNC: 140 MEQ/L (ref 132–144)
WBC # BLD: 21.5 K/UL (ref 4.8–10.8)

## 2017-11-17 PROCEDURE — 6370000000 HC RX 637 (ALT 250 FOR IP): Performed by: INTERNAL MEDICINE

## 2017-11-17 PROCEDURE — 6360000002 HC RX W HCPCS: Performed by: INTERNAL MEDICINE

## 2017-11-17 PROCEDURE — 6360000002 HC RX W HCPCS: Performed by: PHYSICIAN ASSISTANT

## 2017-11-17 PROCEDURE — 6370000000 HC RX 637 (ALT 250 FOR IP): Performed by: PHYSICIAN ASSISTANT

## 2017-11-17 PROCEDURE — 1210000000 HC MED SURG R&B

## 2017-11-17 PROCEDURE — 94640 AIRWAY INHALATION TREATMENT: CPT

## 2017-11-17 PROCEDURE — 36415 COLL VENOUS BLD VENIPUNCTURE: CPT

## 2017-11-17 PROCEDURE — 80048 BASIC METABOLIC PNL TOTAL CA: CPT

## 2017-11-17 PROCEDURE — 2580000003 HC RX 258: Performed by: PHYSICIAN ASSISTANT

## 2017-11-17 PROCEDURE — 99232 SBSQ HOSP IP/OBS MODERATE 35: CPT | Performed by: INTERNAL MEDICINE

## 2017-11-17 PROCEDURE — 94668 MNPJ CHEST WALL SBSQ: CPT

## 2017-11-17 PROCEDURE — 2700000000 HC OXYGEN THERAPY PER DAY

## 2017-11-17 PROCEDURE — 85025 COMPLETE CBC W/AUTO DIFF WBC: CPT

## 2017-11-17 PROCEDURE — 94762 N-INVAS EAR/PLS OXIMTRY CONT: CPT

## 2017-11-17 RX ADMIN — NICOTINE POLACRILEX 2 MG: 2 GUM, CHEWING BUCCAL at 09:55

## 2017-11-17 RX ADMIN — ENOXAPARIN SODIUM 40 MG: 40 INJECTION, SOLUTION INTRAVENOUS; SUBCUTANEOUS at 09:54

## 2017-11-17 RX ADMIN — Medication 10 ML: at 23:13

## 2017-11-17 RX ADMIN — AZITHROMYCIN 250 MG: 250 TABLET, FILM COATED ORAL at 09:55

## 2017-11-17 RX ADMIN — METHYLPREDNISOLONE SODIUM SUCCINATE 40 MG: 40 INJECTION, POWDER, FOR SOLUTION INTRAMUSCULAR; INTRAVENOUS at 17:15

## 2017-11-17 RX ADMIN — TRAMADOL HYDROCHLORIDE 50 MG: 50 TABLET, FILM COATED ORAL at 14:04

## 2017-11-17 RX ADMIN — NICOTINE POLACRILEX 2 MG: 2 GUM, CHEWING BUCCAL at 06:33

## 2017-11-17 RX ADMIN — TIZANIDINE 4 MG: 4 TABLET ORAL at 23:21

## 2017-11-17 RX ADMIN — GUAIFENESIN 600 MG: 600 TABLET, EXTENDED RELEASE ORAL at 09:54

## 2017-11-17 RX ADMIN — ANTACID TABLETS 500 MG: 500 TABLET, CHEWABLE ORAL at 14:03

## 2017-11-17 RX ADMIN — LEVALBUTEROL 1.25 MG: 1.25 SOLUTION, CONCENTRATE RESPIRATORY (INHALATION) at 13:35

## 2017-11-17 RX ADMIN — ISODIUM CHLORIDE 3 ML: 0.03 SOLUTION RESPIRATORY (INHALATION) at 20:49

## 2017-11-17 RX ADMIN — GUAIFENESIN 600 MG: 600 TABLET, EXTENDED RELEASE ORAL at 23:13

## 2017-11-17 RX ADMIN — NICOTINE POLACRILEX 2 MG: 2 GUM, CHEWING BUCCAL at 19:39

## 2017-11-17 RX ADMIN — LEVALBUTEROL 1.25 MG: 1.25 SOLUTION, CONCENTRATE RESPIRATORY (INHALATION) at 20:41

## 2017-11-17 RX ADMIN — LEVALBUTEROL 1.25 MG: 1.25 SOLUTION, CONCENTRATE RESPIRATORY (INHALATION) at 07:31

## 2017-11-17 RX ADMIN — FLUOXETINE 20 MG: 20 CAPSULE ORAL at 09:55

## 2017-11-17 RX ADMIN — METHYLPREDNISOLONE SODIUM SUCCINATE 40 MG: 40 INJECTION, POWDER, FOR SOLUTION INTRAMUSCULAR; INTRAVENOUS at 23:13

## 2017-11-17 RX ADMIN — Medication 10 ML: at 00:38

## 2017-11-17 RX ADMIN — Medication 10 ML: at 09:59

## 2017-11-17 RX ADMIN — ANTACID TABLETS 500 MG: 500 TABLET, CHEWABLE ORAL at 23:19

## 2017-11-17 RX ADMIN — TRAMADOL HYDROCHLORIDE 50 MG: 50 TABLET, FILM COATED ORAL at 04:53

## 2017-11-17 RX ADMIN — METHYLPREDNISOLONE SODIUM SUCCINATE 40 MG: 40 INJECTION, POWDER, FOR SOLUTION INTRAMUSCULAR; INTRAVENOUS at 06:26

## 2017-11-17 RX ADMIN — NICOTINE POLACRILEX 2 MG: 2 GUM, CHEWING BUCCAL at 16:58

## 2017-11-17 RX ADMIN — ISODIUM CHLORIDE 3 ML: 0.03 SOLUTION RESPIRATORY (INHALATION) at 07:41

## 2017-11-17 RX ADMIN — VITAMIN D, TAB 1000IU (100/BT) 1000 UNITS: 25 TAB at 09:54

## 2017-11-17 ASSESSMENT — PAIN DESCRIPTION - PROGRESSION
CLINICAL_PROGRESSION: GRADUALLY WORSENING

## 2017-11-17 ASSESSMENT — PAIN SCALES - GENERAL
PAINLEVEL_OUTOF10: 7
PAINLEVEL_OUTOF10: 7

## 2017-11-17 NOTE — PROGRESS NOTES
Department of Internal Medicine   Progress Note      SUBJECTIVE:  Still short of breath with some improvement with inhalers and steroids. All 12 systems reviewed and are negative.     OBJECTIVE      Medications    Current Facility-Administered Medications: levalbuterol (XOPENEX) nebulizer solution 1.25 mg, 1.25 mg, Nebulization, TID  sodium chloride nebulizer 0.9 % solution 3 mL, 3 mL, Nebulization, Q2H PRN  azithromycin (ZITHROMAX) tablet 250 mg, 250 mg, Oral, Daily  calcium carbonate (TUMS) chewable tablet 500 mg, 500 mg, Oral, TID PRN  tiZANidine (ZANAFLEX) tablet 4 mg, 4 mg, Oral, Q6H PRN  influenza type A&B vaccine (FLUVIRIN) injection 0.5 mL, 0.5 mL, Intramuscular, Once  sodium chloride flush 0.9 % injection 10 mL, 10 mL, Intravenous, 2 times per day  sodium chloride flush 0.9 % injection 10 mL, 10 mL, Intravenous, PRN  acetaminophen (TYLENOL) tablet 650 mg, 650 mg, Oral, Q4H PRN  magnesium hydroxide (MILK OF MAGNESIA) 400 MG/5ML suspension 30 mL, 30 mL, Oral, Daily PRN  ondansetron (ZOFRAN) injection 4 mg, 4 mg, Intravenous, Q6H PRN  enoxaparin (LOVENOX) injection 40 mg, 40 mg, Subcutaneous, Daily  methylPREDNISolone sodium (SOLU-MEDROL) injection 40 mg, 40 mg, Intravenous, Q8H  benzonatate (TESSALON) capsule 100 mg, 100 mg, Oral, TID PRN  guaiFENesin (MUCINEX) extended release tablet 600 mg, 600 mg, Oral, BID  FLUoxetine (PROZAC) capsule 20 mg, 20 mg, Oral, Daily  tobramycin (TOBREX) 0.3 % ophthalmic solution 1 drop, 1 drop, Left Eye, Q4H  traMADol (ULTRAM) tablet 50 mg, 50 mg, Oral, Q6H PRN  vitamin D (CHOLECALCIFEROL) tablet 1,000 Units, 1,000 Units, Oral, Daily  nicotine polacrilex (NICORETTE) gum 2 mg, 2 mg, Oral, PRN  nicotine (NICODERM CQ) 14 MG/24HR 1 patch, 1 patch, Transdermal, Daily  albuterol (PROVENTIL) nebulizer solution 2.5 mg, 2.5 mg, Nebulization, Q2H PRN  Physical    VITALS:  /77   Pulse 80   Temp 98.4 °F (36.9 °C) (Oral)   Resp 16   Ht 5' 3\" (1.6 m)   Wt 175 lb (79.4 kg)   LMP (LMP Unknown)   SpO2 93%   BMI 31.00 kg/m²   TEMPERATURE:  Current - Temp: 98.4 °F (36.9 °C);  Max - Temp  Av.3 °F (36.8 °C)  Min: 97.7 °F (36.5 °C)  Max: 98.7 °F (37.1 °C)  RESPIRATIONS RANGE: Resp  Av.4  Min: 16  Max: 22  PULSE RANGE: Pulse  Av.1  Min: 61  Max: 104  BLOOD PRESSURE RANGE:  Systolic (33KSS), ZOZ:932 , Min:133 , SYE:695   ; Diastolic (15XGC), OPX:72, Min:72, Max:87    PULSE OXIMETRY RANGE: SpO2  Av.3 %  Min: 93 %  Max: 99 %  24HR INTAKE/OUTPUT:    Intake/Output Summary (Last 24 hours) at 17 1012  Last data filed at 17 1845   Gross per 24 hour   Intake              480 ml   Output                1 ml   Net              479 ml     CONSTITUTIONAL:  alert and no apparent distress  EYES:  pupils equal, round and reactive to light, sclera clear and conjunctiva normal  ENT:  normocepalic, without obvious abnormality, atraumatic  NECK:  supple, symmetrical, trachea midline and skin normal  LUNGS:  Harsh cough, diffuse rhonchi bilaterally   CARDIOVASCULAR:  normal apical pulses and normal S1 and S2  ABDOMEN:  normal bowel sounds and non-tender  MUSCULOSKELETAL:  there is no redness, warmth, or swelling of the joints  full range of motion noted  NEUROLOGIC:  Mental Status Exam:  Level of Alertness:   awake  Orientation:   person, place, time  SKIN:  normal skin color, texture, turgor  Data    LABS  Recent Labs      11/14/17   1115  11/15/17   0522   WBC  12.7*  22.0*   RBC  4.84  4.27   HGB  15.1  13.8   HCT  45.1  40.9   MCV  93.1  95.7   MCH  31.1  32.4*   MCHC  33.4  33.8   RDW  13.4  13.5   PLT  358  333       Recent Labs      17   1115  11/15/17   05   NA  141  141   K  4.1  4.5   CL  101  104   CO2  23  23   BUN  10  17   CREATININE  0.39*  0.41*   GLUCOSE  117*  145*   CALCIUM  9.6  9.4       Recent Labs      11/15/17   0522   MG  2.9*         ASSESSMENT AND PLAN      Active Hospital Problems    Diagnosis Date Noted    COPD (chronic obstructive pulmonary disease) with acute bronchitis ([facility] [J44.0, J20.9] 11/14/2017    Tobacco abuse [Z72.0] 11/14/2017    Major depressive disorder, recurrent episode, in partial remission ([facility] [F33.41] 09/15/2015     - COPD exacerbation:steroids, breathing treatment, antibiotics.  Acapella, Chest PT  - Acute bronchitis: Zithromax  - Tobacco use: nicotine patch  - depression: continue home meds    Nargis Deleon MD  Pager : [phone]

## 2017-11-17 NOTE — PROGRESS NOTES
INPATIENT PROGRESS NOTES    PATIENT NAME: Brayan Balderas  MRN: [id number]  SERVICE DATE:  November 17, 2017   SERVICE TIME:  12:01 PM      PRIMARY SERVICE:  Pulmonary Medicine     INTERVAL HPI: Patient seen and examined at bedside, Interval Notes, orders reviewed. Nursing notes noted: Patient reports some improvement in her symptoms today. She does have some shortness of breath with exertion which has remained the same. Patient denies any chest pain, leg swelling, abdominal pain, nausea, diarrhea, fever and chills. She reports some generalized lower extremity pain. Patient had d-dimer in the ER that was negative. Chest x-ray was also unremarkable. Patient reports continued issues with being able to expectorate sputum. Chest physiotherapy was initiated yesterday. Patient reports significant wheezing and coughing. Review of Systems  Please see HPI above. OBJECTIVE    Body mass index is 31 kg/m². PHYSICAL EXAM:  Vitals:  /77   Pulse 80   Temp 98.4 °F (36.9 °C) (Oral)   Resp 16   Ht 5' 3\" (1.6 m)   Wt 175 lb (79.4 kg)   LMP  (LMP Unknown)   SpO2 93%   BMI 31.00 kg/m²   General: Patient is comfortable upright sitting in a chair No distress. Head: Atraumatic , Normocephalic   Eyes: PERRL. No sclera icterus. No conjunctival injection. No discharge   ENT: No nasal  discharge. Pharynx clear. Neck:  Trachea midline. No thyromegaly, no JVD, No cervical adenopathy. Resp : Diminished breath sounds bilaterally with diffuse expiratory rhonchi and wheezing. Normal effort with no accessory muscle use. CV: Normal  rate. Regular rhythm. No mumur ,  Rub or gallop  ABD: Non-tender. Non-distended. No masses. No organmegaly. Normal bowel sounds. No hernia. EXT: No Pitting both legs, nontender no erythema No Cyanosis No clubbing  Neuro: no focal weakness  Skin: Warm and dry. No erythema rash on exposed extremities.       DATA:   Recent Labs      11/15/17   0522  11/17/17   1006   WBC  22.0* 21.5*   HGB  13.8  14.6   HCT  40.9  44.7   MCV  95.7  96.9   PLT  333  334     Recent Labs      11/15/17   0522  11/17/17   1006   NA  141  140   K  4.5  4.3   CL  104  99   CO2  23  22   BUN  17  17   CREATININE  0.41*  0.66   GLUCOSE  145*  232*   CALCIUM  9.4  9.8   LABGLOM  >60.0  >60.0   GFRAA  >60.0  >60.0         No results for input(s): PHART, CXV7CAI, PO2ART, BKS3MDO, BEART, G4IXUMLN in the last 72 hours. O2 Device: Nasal cannula  O2 Flow Rate (L/min): 2 L/min  No results found for: LACTA     MEDICATIONS during current hospitalization:    Continuous Infusions:     Scheduled Meds:   levalbuterol  1.25 mg Nebulization TID    azithromycin  250 mg Oral Daily    influenza virus vaccine  0.5 mL Intramuscular Once    sodium chloride flush  10 mL Intravenous 2 times per day    enoxaparin  40 mg Subcutaneous Daily    methylPREDNISolone  40 mg Intravenous Q8H    guaiFENesin  600 mg Oral BID    FLUoxetine  20 mg Oral Daily    tobramycin  1 drop Left Eye Q4H    vitamin D  1,000 Units Oral Daily    nicotine  1 patch Transdermal Daily       PRN Meds:sodium chloride nebulizer, calcium carbonate, tiZANidine, sodium chloride flush, acetaminophen, magnesium hydroxide, ondansetron, benzonatate, traMADol, nicotine polacrilex, albuterol    Radiology  Xr Chest Standard (2 Vw)    Result Date: 11/16/2017  EXAMINATION: XR CHEST STANDARD TWO VW CLINICAL HISTORY: Coughing and wheezing, onset this a.m. COMPARISONS: 11/14/2017 FINDINGS: Cardiac size and pulmonary vascularity are normal. The lungs are clear. There is no evidence of adenopathy. There is minimal spondylosis. NEGATIVE CHEST RADIOGRAPHS    Xr Chest Portable    Result Date: 11/14/2017  EXAM: PORTABLE CHEST COMPARISON: 10/16/2017 REASON FOR EXAMINATION: UPPER RESPIRATORY CONGESTION AND DYSPNEA X4 WEEKS FINDINGS: Portable chest x-ray demonstrates normal appearance of the heart and mediastinum. The lungs appear clear.   The visualized bony thorax and remainder of the chest appears unremarkable. NO EVIDENCE OF ACTIVE DISEASE IN THE CHEST.       ASSESSMENT /Plan:  1. Acute exacerbation of COPD  2. Acute bronchitis with retained airway secretions and severe bronchospasm    Patient is improving slowly. Continue current treatment plan including empiric and biotic therapy, IV steroids, chest physiotherapy, bronchodilators. We will continue to follow.     Electronically signed by Rita Castellano PA-C on 11/17/2017 at 12:01 PM

## 2017-11-18 VITALS
HEIGHT: 63 IN | WEIGHT: 174.5 LBS | OXYGEN SATURATION: 95 % | BODY MASS INDEX: 30.92 KG/M2 | TEMPERATURE: 98.2 F | DIASTOLIC BLOOD PRESSURE: 91 MMHG | SYSTOLIC BLOOD PRESSURE: 147 MMHG | HEART RATE: 81 BPM | RESPIRATION RATE: 20 BRPM

## 2017-11-18 PROCEDURE — 90732 PPSV23 VACC 2 YRS+ SUBQ/IM: CPT | Performed by: INTERNAL MEDICINE

## 2017-11-18 PROCEDURE — 6360000002 HC RX W HCPCS: Performed by: INTERNAL MEDICINE

## 2017-11-18 PROCEDURE — 99232 SBSQ HOSP IP/OBS MODERATE 35: CPT | Performed by: INTERNAL MEDICINE

## 2017-11-18 PROCEDURE — 6370000000 HC RX 637 (ALT 250 FOR IP): Performed by: INTERNAL MEDICINE

## 2017-11-18 PROCEDURE — 94668 MNPJ CHEST WALL SBSQ: CPT

## 2017-11-18 PROCEDURE — 6370000000 HC RX 637 (ALT 250 FOR IP): Performed by: PHYSICIAN ASSISTANT

## 2017-11-18 PROCEDURE — G0009 ADMIN PNEUMOCOCCAL VACCINE: HCPCS | Performed by: INTERNAL MEDICINE

## 2017-11-18 PROCEDURE — 94640 AIRWAY INHALATION TREATMENT: CPT

## 2017-11-18 PROCEDURE — 6360000002 HC RX W HCPCS: Performed by: PHYSICIAN ASSISTANT

## 2017-11-18 PROCEDURE — 2580000003 HC RX 258: Performed by: PHYSICIAN ASSISTANT

## 2017-11-18 PROCEDURE — 2700000000 HC OXYGEN THERAPY PER DAY

## 2017-11-18 PROCEDURE — 94620 HC 6-MINUTE WALK TEST/PULM STRESS TEST SIMPLE: CPT

## 2017-11-18 RX ORDER — PREDNISONE 10 MG/1
TABLET ORAL
Qty: 30 TABLET | Refills: 0 | Status: SHIPPED | OUTPATIENT
Start: 2017-11-18 | End: 2017-12-20

## 2017-11-18 RX ORDER — NICOTINE 21 MG/24HR
1 PATCH, TRANSDERMAL 24 HOURS TRANSDERMAL DAILY
Qty: 30 PATCH | Refills: 3 | Status: SHIPPED | OUTPATIENT
Start: 2017-11-19 | End: 2018-04-25

## 2017-11-18 RX ORDER — AZITHROMYCIN 250 MG/1
TABLET, FILM COATED ORAL
Qty: 1 PACKET | Refills: 0 | Status: SHIPPED | OUTPATIENT
Start: 2017-11-18 | End: 2017-11-27 | Stop reason: ALTCHOICE

## 2017-11-18 RX ORDER — BENZONATATE 100 MG/1
100 CAPSULE ORAL 3 TIMES DAILY PRN
Qty: 90 CAPSULE | Refills: 0 | Status: SHIPPED | OUTPATIENT
Start: 2017-11-18 | End: 2017-11-25

## 2017-11-18 RX ORDER — GUAIFENESIN 600 MG/1
600 TABLET, EXTENDED RELEASE ORAL 2 TIMES DAILY
Qty: 60 TABLET | Refills: 0 | Status: SHIPPED | OUTPATIENT
Start: 2017-11-18 | End: 2018-01-29 | Stop reason: ALTCHOICE

## 2017-11-18 RX ADMIN — ACETAMINOPHEN 650 MG: 325 TABLET ORAL at 06:15

## 2017-11-18 RX ADMIN — FLUOXETINE 20 MG: 20 CAPSULE ORAL at 08:50

## 2017-11-18 RX ADMIN — LEVALBUTEROL 1.25 MG: 1.25 SOLUTION, CONCENTRATE RESPIRATORY (INHALATION) at 14:10

## 2017-11-18 RX ADMIN — AZITHROMYCIN 250 MG: 250 TABLET, FILM COATED ORAL at 08:50

## 2017-11-18 RX ADMIN — TOBRAMYCIN 1 DROP: 3 SOLUTION OPHTHALMIC at 11:19

## 2017-11-18 RX ADMIN — METHYLPREDNISOLONE SODIUM SUCCINATE 40 MG: 40 INJECTION, POWDER, FOR SOLUTION INTRAMUSCULAR; INTRAVENOUS at 06:16

## 2017-11-18 RX ADMIN — TRAMADOL HYDROCHLORIDE 50 MG: 50 TABLET, FILM COATED ORAL at 08:58

## 2017-11-18 RX ADMIN — NICOTINE POLACRILEX 2 MG: 2 GUM, CHEWING BUCCAL at 06:16

## 2017-11-18 RX ADMIN — Medication 10 ML: at 08:51

## 2017-11-18 RX ADMIN — ACETAMINOPHEN 650 MG: 325 TABLET ORAL at 12:06

## 2017-11-18 RX ADMIN — PNEUMOCOCCAL VACCINE POLYVALENT 0.5 ML
25; 25; 25; 25; 25; 25; 25; 25; 25; 25; 25; 25; 25; 25; 25; 25; 25; 25; 25; 25; 25; 25; 25 INJECTION, SOLUTION INTRAMUSCULAR; SUBCUTANEOUS at 16:13

## 2017-11-18 RX ADMIN — LEVALBUTEROL 1.25 MG: 1.25 SOLUTION, CONCENTRATE RESPIRATORY (INHALATION) at 07:24

## 2017-11-18 RX ADMIN — VITAMIN D, TAB 1000IU (100/BT) 1000 UNITS: 25 TAB at 08:50

## 2017-11-18 RX ADMIN — METHYLPREDNISOLONE SODIUM SUCCINATE 40 MG: 40 INJECTION, POWDER, FOR SOLUTION INTRAMUSCULAR; INTRAVENOUS at 15:13

## 2017-11-18 RX ADMIN — TRAMADOL HYDROCHLORIDE 50 MG: 50 TABLET, FILM COATED ORAL at 15:13

## 2017-11-18 RX ADMIN — TOBRAMYCIN 1 DROP: 3 SOLUTION OPHTHALMIC at 15:14

## 2017-11-18 RX ADMIN — GUAIFENESIN 600 MG: 600 TABLET, EXTENDED RELEASE ORAL at 08:50

## 2017-11-18 RX ADMIN — ANTACID TABLETS 500 MG: 500 TABLET, CHEWABLE ORAL at 08:58

## 2017-11-18 ASSESSMENT — PAIN SCALES - GENERAL
PAINLEVEL_OUTOF10: 6
PAINLEVEL_OUTOF10: 7
PAINLEVEL_OUTOF10: 5
PAINLEVEL_OUTOF10: 7
PAINLEVEL_OUTOF10: 0

## 2017-11-18 NOTE — DISCHARGE SUMMARY
Discharge Summary Note  Patient ID:  Jose Manuel Rodriguez  [id number]  89 y.o.  1965    Admit date: 11/14/2017    Discharge date and time: No discharge date for patient encounter. Admitting Physician: aTmica Hill MD     Discharge Physician: Jonathan Emanuel MD    Admission Diagnoses:   Hypoxemia [R09.02]    Discharge Diagnoses: The same as the admitting diagnosis but also includes:  1. COPD with acute exacerbation  2. Acute bronchitis  3. Nicotine abuse  4. Bipolar disorder. Admission Condition: poor    Discharged Condition: good    Hospital Course: 60-year-old female admitted with an acute exacerbation of COPD in the setting of ongoing nicotine abuse. She responded well to IV steroids, antibiotics, breathing treatments, Acapella, and chest PT. Given her clinical improvement she was discharged home in stable condition with prescriptions for a prednisone taper and azithromycin. Follow-up appointment was scheduled with pulmonary medicine for their recommendations. .    Consults: pulmonary/intensive care    Significant Diagnostic Studies:      Radiology  Xr Chest Standard (2 Vw)     Result Date: 11/16/2017  EXAMINATION: XR CHEST STANDARD TWO VW CLINICAL HISTORY: Coughing and wheezing, onset this a.m. COMPARISONS: 11/14/2017 FINDINGS: Cardiac size and pulmonary vascularity are normal. The lungs are clear. There is no evidence of adenopathy. There is minimal spondylosis.      NEGATIVE CHEST RADIOGRAPHS     Xr Chest Portable     Result Date: 11/14/2017  EXAM: PORTABLE CHEST COMPARISON: 10/16/2017 REASON FOR EXAMINATION: UPPER RESPIRATORY CONGESTION AND DYSPNEA X4 WEEKS FINDINGS: Portable chest x-ray demonstrates normal appearance of the heart and mediastinum. The lungs appear clear. The visualized bony thorax and remainder of the chest appears unremarkable.      Treatments: Bronchodilators, IV steroids, antibiotics. Discharge Exam:  As per progress note on the day of discharge.     Disposition:

## 2017-11-18 NOTE — PROGRESS NOTES
(36.8 °C); Max - Temp  Av.9 °F (36.6 °C)  Min: 97.3 °F (36.3 °C)  Max: 98.2 °F (36.8 °C)  RESPIRATIONS RANGE: Resp  Av.7  Min: 17  Max: 18  PULSE RANGE: Pulse  Av  Min: 67  Max: 92  BLOOD PRESSURE RANGE:  Systolic (56EUK), EYT:275 , Min:120 , UBA:108   ; Diastolic (64ZAJ), WILL:60, Min:77, Max:84    PULSE OXIMETRY RANGE: SpO2  Av.5 %  Min: 93 %  Max: 95 %  24HR INTAKE/OUTPUT:      Intake/Output Summary (Last 24 hours) at 17 1056  Last data filed at 17 0608   Gross per 24 hour   Intake             1500 ml   Output             1500 ml   Net                0 ml     CONSTITUTIONAL:  alert and no apparent distress  EYES:  pupils equal, round and reactive to light, sclera clear and conjunctiva normal  ENT:  normocepalic, without obvious abnormality, atraumatic  NECK:  supple, symmetrical, trachea midline and skin normal  LUNGS: feint expiratory wheezing. Excellent air movent throughout all lung zones. CARDIOVASCULAR:  normal apical pulses and normal S1 and S2  ABDOMEN:  normal bowel sounds and non-tender  MUSCULOSKELETAL:  there is no redness, warmth, or swelling of the joints  full range of motion noted  NEUROLOGIC:  Mental Status Exam:  Level of Alertness:   awake  Orientation:   person, place, time  SKIN:  normal skin color, texture, turgor      Data    LABS  Recent Labs      17   1006   WBC  21.5*   RBC  4.61   HGB  14.6   HCT  44.7   MCV  96.9   MCH  31.6*   MCHC  32.6*   RDW  13.7   PLT  334       Recent Labs      17   1006   NA  140   K  4.3   CL  99   CO2  22   BUN  17   CREATININE  0.66   GLUCOSE  232*   CALCIUM  9.8       No results for input(s): MG in the last 72 hours.       ASSESSMENT AND PLAN      Active Hospital Problems    Diagnosis Date Noted    COPD with acute exacerbation ([facility] [J44.1]     Acute bronchitis [J20.9] 2017    Tobacco abuse [Z72.0] 2017    Major depressive disorder, recurrent episode, in partial remission ([facility] [F33.41] 09/15/2015

## 2017-11-18 NOTE — PROGRESS NOTES
INPATIENT PROGRESS NOTES    PATIENT NAME: Memo Freitas  MRN: [id number]  SERVICE DATE:  November 18, 2017   SERVICE TIME:  2:31 PM      PRIMARY SERVICE:  Pulmonary Medicine     INTERVAL HPI: Patient seen and examined at bedside, Interval Notes, orders reviewed. Nursing notes noted: Patient reports some improvement in her symptoms today. She does have some shortness of breath with exertion which has remained the same. Patient denies any chest pain, leg swelling, abdominal pain, nausea, diarrhea, fever and chills. She reports some generalized lower extremity pain. Patient had d-dimer in the ER that was negative. Chest x-ray was also unremarkable. Patient reports continued issues with being able to expectorate sputum. Chest physiotherapy was initiated yesterday. Patient reports significant wheezing and coughing. Review of Systems  Please see HPI above. OBJECTIVE    Body mass index is 30.91 kg/m². PHYSICAL EXAM:  Vitals:  BP (!) 147/91   Pulse 81   Temp 98.2 °F (36.8 °C)   Resp 20   Ht 5' 3\" (1.6 m)   Wt 174 lb 8 oz (79.2 kg)   LMP  (LMP Unknown)   SpO2 95%   BMI 30.91 kg/m²   General: Patient is comfortable upright sitting in a chair No distress. Head: Atraumatic , Normocephalic   Eyes: PERRL. No sclera icterus. No conjunctival injection. No discharge   ENT: No nasal  discharge. Pharynx clear. Neck:  Trachea midline. No thyromegaly, no JVD, No cervical adenopathy. Resp : Diminished breath sounds bilaterally with diffuse expiratory rhonchi and wheezing. Normal effort with no accessory muscle use. CV: Normal  rate. Regular rhythm. No mumur ,  Rub or gallop  ABD: Non-tender. Non-distended. No masses. No organmegaly. Normal bowel sounds. No hernia. EXT: No Pitting both legs, nontender no erythema No Cyanosis No clubbing  Neuro: no focal weakness  Skin: Warm and dry. No erythema rash on exposed extremities.       DATA:   Recent Labs      11/17/17   1006   WBC  21.5*   HGB  14.6   HCT 2. Acute bronchitis, improved. 3. Hypoxia with sleep. Patient is improving slowly. O2 2lit with sleep. Ok to d/c home . F/u in office 4 weeks. Taper dose of po steroid and po antibiotics.     Electronically signed by Willi Alfaro MD on 11/18/2017 at 2:31 PM

## 2017-11-18 NOTE — PROGRESS NOTES
Pt mentioned that wanted tums & nicorette gum during report. As soon as report was over I gave her the nicorette.   Tums cannot be given until 10pm

## 2017-11-18 NOTE — FLOWSHEET NOTE
Discharge instructions given. Patient verbalized understanding. Scripts given. Discharged home with son.

## 2017-11-19 ENCOUNTER — CARE COORDINATION (OUTPATIENT)
Dept: CASE MANAGEMENT | Age: 52
End: 2017-11-19

## 2017-11-19 LAB
BLOOD CULTURE, ROUTINE: NORMAL
CULTURE, BLOOD 2: NORMAL

## 2017-11-19 NOTE — CARE COORDINATION
[facility] Transitions Initial Follow Up Call    Call within 2 business days of discharge: Yes    Patient: Hilda Spurling Patient : 1965   MRN: <[id number]>  Reason for Admission: COPD  Discharge Date: 17 RARS: [facility] Risk Score: 16     Attempted initial 24 hour transitional call to patient. Left VM to return call directly to [phone], [phone] or [phone]. 1st attempt.      Care Transitions 24 Hour Call    Care Transitions Interventions         Follow Up  Future Appointments  Date Time Provider Stacey Kirkland   2017 2:00 PM [facility]

## 2017-11-21 ENCOUNTER — TELEPHONE (OUTPATIENT)
Dept: PULMONOLOGY | Age: 52
End: 2017-11-21

## 2017-11-21 NOTE — TELEPHONE ENCOUNTER
Called pt lvm, advised to stop taking was prescribed in oct same meds no reaction documented. If reaction gets worse go to er.

## 2017-11-27 ENCOUNTER — OFFICE VISIT (OUTPATIENT)
Dept: INTERNAL MEDICINE | Age: 52
End: 2017-11-27

## 2017-11-27 ENCOUNTER — HOSPITAL ENCOUNTER (OUTPATIENT)
Dept: PULMONOLOGY | Age: 52
Setting detail: THERAPIES SERIES
Discharge: HOME | End: 2017-11-27
Payer: MEDICARE

## 2017-11-27 VITALS
OXYGEN SATURATION: 94 % | TEMPERATURE: 98.4 F | SYSTOLIC BLOOD PRESSURE: 144 MMHG | BODY MASS INDEX: 30.65 KG/M2 | DIASTOLIC BLOOD PRESSURE: 100 MMHG | HEART RATE: 97 BPM | WEIGHT: 173 LBS | HEIGHT: 63 IN

## 2017-11-27 DIAGNOSIS — E55.9 VITAMIN D INSUFFICIENCY: ICD-10-CM

## 2017-11-27 DIAGNOSIS — J44.1 CHRONIC OBSTRUCTIVE PULMONARY DISEASE WITH ACUTE EXACERBATION (HCC): ICD-10-CM

## 2017-11-27 DIAGNOSIS — J44.9 CHRONIC OBSTRUCTIVE PULMONARY DISEASE, UNSPECIFIED COPD TYPE (HCC): Primary | ICD-10-CM

## 2017-11-27 DIAGNOSIS — M79.10 MYALGIA: ICD-10-CM

## 2017-11-27 PROCEDURE — G0424 PULMONARY REHAB W EXER: HCPCS

## 2017-11-27 PROCEDURE — 99495 TRANSJ CARE MGMT MOD F2F 14D: CPT | Performed by: INTERNAL MEDICINE

## 2017-11-29 LAB — VITAMIN D 25-HYDROXY: 23.7 NG/ML (ref 30–100)

## 2017-12-11 RX ORDER — TIZANIDINE 4 MG/1
TABLET ORAL
Refills: 0 | COMMUNITY
Start: 2017-11-30 | End: 2018-01-29

## 2017-12-16 ASSESSMENT — ENCOUNTER SYMPTOMS
ABDOMINAL PAIN: 0
SHORTNESS OF BREATH: 0
CHOKING: 0
COUGH: 1
WHEEZING: 0
CHEST TIGHTNESS: 0
TROUBLE SWALLOWING: 0

## 2017-12-16 ASSESSMENT — COPD QUESTIONNAIRES: COPD: 1

## 2017-12-16 NOTE — PROGRESS NOTES
[address] [facility] [place]  [address]  [address]  [address] [facility]  Dept: [phone]  Dept Fax: [phone]: [phone]    Transition Care Office Visit    Date of Face-to-Face: 11/27/2017    Persons at visit: The patient presents for the transfer of care office visit by herself    Lit Wetzel was hospitalized from 11/14/2017 to 11/18/2017 at [facility], regular nursing floor. Here for follow after hospitalization for: Acute exacerbation of COPD  Patient was contacted by telephone on 11/20/2017    Activity: activity as tolerated    Any medication changes since post-hospitalization phone call? No    Any treatment changes since post-hospitalization phone call? No    Any further education needed on medications/treatment plan? Yes, to continue abstinence from smoking, to continue vitamin D supplements for long term. Current Medication List  Current Outpatient Prescriptions on File Prior to Visit   Medication Sig Dispense Refill    guaiFENesin (MUCINEX) 600 MG extended release tablet Take 1 tablet by mouth 2 times daily 60 tablet 0    nicotine (NICODERM CQ) 14 MG/24HR Place 1 patch onto the skin daily 30 patch 3    nicotine polacrilex (NICORETTE) 2 MG gum Take 1 each by mouth as needed for Smoking cessation 110 each 3    predniSONE (DELTASONE) 10 MG tablet Prednisone  40 mg dailyx3 days. Prednisone  30 mg dailyx3 days. Prednisone  20 mg dailyx3 days Prednisone  10 mg dailyx3 days 30 tablet 0    albuterol sulfate HFA (PROAIR HFA) 108 (90 Base) MCG/ACT inhaler Inhale 2 puffs into the lungs every 6 hours as needed for Wheezing 1 Inhaler 3    FLUoxetine (PROZAC) 20 MG tablet Take 1 tablet by mouth daily 30 tablet 3    Vitamin D (CHOLECALCIFEROL) 1000 UNITS CAPS capsule Take 1,000 Units by mouth daily      Loratadine (CLARITIN) 10 MG CAPS Take 1 capsule by mouth daily      traMADol (ULTRAM) 50 MG tablet Take 50 mg by mouth every 6 hours as needed.   0     No current facility-administered medications on file prior to visit. Medication Reconciliation  Provider has reviewed medication record and it has been modified as necessary to accurately depict current medications since hospitalization. Nahid Solano has been instructed on these changes. See transitional care telephone note. HPI:     COPD   She complains of cough. There is no shortness of breath or wheezing. This is a chronic problem. The current episode started more than 1 year ago. The problem occurs intermittently. The problem has been waxing and waning. Associated symptoms include myalgias (diffuse). Pertinent negatives include no chest pain, fever or trouble swallowing. Her symptoms are aggravated by change in weather, exposure to smoke, strenuous activity and URI. Her symptoms are alleviated by oral steroids, steroid inhaler, beta-agonist and ipratropium. She reports moderate improvement on treatment. Her symptoms are not alleviated by OTC cough suppressant. Risk factors for lung disease include smoking/tobacco exposure. Her past medical history is significant for bronchitis and COPD. There is no history of bronchiectasis or pneumonia. Leg Pain         This 50-year-old female with history of active smoking till today for admission to the hospital, was treated as outpatient for exacerbation of COPD but failed to improve. In spite of oral steroid course, antibiotic, respiratory treatments, patient's respiratory status continued to decline, she presented to the emergency room and was admitted to the hospital for COPD exacerbation with hypoxemia. She was treated with intravenous steroids, antibiotics, intensive respiratory therapy, received repeat counseling on smoking cessation. Improved clinically and was discharged back to independent living. Since hospital discharge, she was able to abstain from smoking.   Today she states she still has a lot of cough but less sputum production and breathing seems easier. Review of Systems:     Review of Systems   Constitutional: Negative for chills, diaphoresis, fatigue and fever. HENT: Negative for congestion, nosebleeds and trouble swallowing. Respiratory: Positive for cough. Negative for choking, chest tightness, shortness of breath and wheezing. Cardiovascular: Negative for chest pain and palpitations. Gastrointestinal: Negative for abdominal pain. Endocrine: Negative for cold intolerance and heat intolerance. Genitourinary: Negative for hematuria. Musculoskeletal: Positive for myalgias (diffuse). Skin: Negative for rash. Neurological: Negative for dizziness, tremors, weakness and light-headedness. Psychiatric/Behavioral: Positive for dysphoric mood. Negative for decreased concentration and sleep disturbance. The patient is nervous/anxious. Exam:   BP (!) 144/100 (Site: Right Arm, Position: Sitting, Cuff Size: Medium Adult)   Pulse 97   Temp 98.4 °F (36.9 °C) (Tympanic)   Ht 5' 3\" (1.6 m)   Wt 173 lb (78.5 kg)   LMP  (LMP Unknown)   SpO2 94%   BMI 30.65 kg/m²   Physical Exam   Constitutional: She is oriented to person, place, and time. No distress. Obese, age appropriate, well groomed     HENT:   Head: Atraumatic. Eyes: Conjunctivae are normal.   Neck: Neck supple. No JVD present. Cardiovascular: Regular rhythm and normal heart sounds. Exam reveals no gallop. Pulmonary/Chest: Effort normal. She has no wheezes. She has no rales. Diminished breath sounds bilaterally     Abdominal: She exhibits no distension. Musculoskeletal: Normal range of motion. Lymphadenopathy:     She has no cervical adenopathy. Neurological: She is alert and oriented to person, place, and time. Skin: Skin is warm. No rash noted. Psychiatric: Her mood appears anxious. Her speech is rapid and/or pressured. She is not slowed and not withdrawn. Cognition and memory are normal. She does not express inappropriate judgment.  She does not exhibit a depressed mood. She exhibits normal recent memory and normal remote memory. Improved motivation and insight  She is attentive. Assessment:       1. Chronic obstructive pulmonary disease, unspecified COPD type ([address]   Acute exacerbation with hypoxia resolved    2. Vitamin D insufficiency  Vitamin D 25 Hydroxy   3. Myalgia           Plan:   Reviewed with the patient (and/or caregiver, as appropriate), current clinical status, medications, activities and diet. The current medical regimen is effective;  continue present plan and medications. Return in about 2 months (around 1/27/2018).     Orders Placed This Encounter   Procedures    Vitamin D 25 Hydroxy     Standing Status:   Future     Number of Occurrences:   1     Standing Expiration Date:   11/27/2018     Diagnostic Tests performed in hospital: CXR, blood work  Requested/reviewed: Yes    Disease Education:  COPD, tobacco dependence, vitamin D insufficiency      Home Health Care :  None      Discussed with other providers: none needed at this time           Note:  CPT Coding - 16653 (Moderate level - seen within 14 days)      29018 (High level - seen within 7 days)  Needs to have associated phone contact within 2 business days of inpatient discharge    Electronically signed by Zachariah Croft MD on 12/16/2017 at 4:07 PM

## 2017-12-19 ENCOUNTER — CARE COORDINATION (OUTPATIENT)
Dept: CASE MANAGEMENT | Age: 52
End: 2017-12-19

## 2017-12-19 NOTE — CARE COORDINATION
[facility] Transitions Follow Up Call    2017    Patient: Roxi Yen  Patient : 1965   MRN: [id number]  Reason for Admission: There are no discharge diagnoses documented for the most recent discharge. Discharge Date: 17 RARS: Risk Score: 16       Spoke with: CTC left VMM, reason for call, & my contact info for final FU. Saw PCP  and had PFTs . Vit D level 23.7 and now on long-term Vit D replacement for myalgia symptoms. Current smoking cessation attempt. Call attempts x3. Inpatient Assessment  Care Transitions Subsequent and Final Call    Subsequent and Final Calls  Care Transitions Interventions  Other Interventions:             Follow Up  Future Appointments  Date Time Provider Stacey Kirkland   2017 9:30 AM Cristina Tracey MD [address]   2018 11:00 AM Charisma Suarez  [facility]

## 2017-12-20 ENCOUNTER — OFFICE VISIT (OUTPATIENT)
Dept: PULMONOLOGY | Age: 52
End: 2017-12-20

## 2017-12-20 VITALS
SYSTOLIC BLOOD PRESSURE: 116 MMHG | OXYGEN SATURATION: 98 % | DIASTOLIC BLOOD PRESSURE: 72 MMHG | RESPIRATION RATE: 20 BRPM | BODY MASS INDEX: 30.83 KG/M2 | HEART RATE: 80 BPM | HEIGHT: 63 IN | TEMPERATURE: 97.9 F | WEIGHT: 174 LBS

## 2017-12-20 DIAGNOSIS — J44.1 CHRONIC OBSTRUCTIVE PULMONARY DISEASE WITH ACUTE EXACERBATION (HCC): Primary | ICD-10-CM

## 2017-12-20 PROCEDURE — 1036F TOBACCO NON-USER: CPT | Performed by: INTERNAL MEDICINE

## 2017-12-20 PROCEDURE — 3023F SPIROM DOC REV: CPT | Performed by: INTERNAL MEDICINE

## 2017-12-20 PROCEDURE — 3017F COLORECTAL CA SCREEN DOC REV: CPT | Performed by: INTERNAL MEDICINE

## 2017-12-20 PROCEDURE — 99214 OFFICE O/P EST MOD 30 MIN: CPT | Performed by: INTERNAL MEDICINE

## 2017-12-20 PROCEDURE — G8484 FLU IMMUNIZE NO ADMIN: HCPCS | Performed by: INTERNAL MEDICINE

## 2017-12-20 PROCEDURE — G8926 SPIRO NO PERF OR DOC: HCPCS | Performed by: INTERNAL MEDICINE

## 2017-12-20 PROCEDURE — G8417 CALC BMI ABV UP PARAM F/U: HCPCS | Performed by: INTERNAL MEDICINE

## 2017-12-20 PROCEDURE — G8427 DOCREV CUR MEDS BY ELIG CLIN: HCPCS | Performed by: INTERNAL MEDICINE

## 2017-12-20 PROCEDURE — 3014F SCREEN MAMMO DOC REV: CPT | Performed by: INTERNAL MEDICINE

## 2017-12-20 RX ORDER — BUDESONIDE AND FORMOTEROL FUMARATE DIHYDRATE 80; 4.5 UG/1; UG/1
2 AEROSOL RESPIRATORY (INHALATION) 2 TIMES DAILY
Qty: 1 INHALER | Refills: 5 | Status: SHIPPED | OUTPATIENT
Start: 2017-12-20 | End: 2019-03-08 | Stop reason: SDUPTHER

## 2017-12-20 ASSESSMENT — ENCOUNTER SYMPTOMS
VOMITING: 0
COUGH: 0
CHEST TIGHTNESS: 0
ABDOMINAL PAIN: 0
DIARRHEA: 0
RHINORRHEA: 0
NAUSEA: 0
WHEEZING: 0
SORE THROAT: 0
SINUS PRESSURE: 0
SHORTNESS OF BREATH: 0

## 2018-01-29 ENCOUNTER — OFFICE VISIT (OUTPATIENT)
Dept: INTERNAL MEDICINE CLINIC | Age: 53
End: 2018-01-29
Payer: MEDICARE

## 2018-01-29 VITALS
HEART RATE: 75 BPM | TEMPERATURE: 97.6 F | HEIGHT: 63 IN | WEIGHT: 174 LBS | SYSTOLIC BLOOD PRESSURE: 130 MMHG | DIASTOLIC BLOOD PRESSURE: 80 MMHG | BODY MASS INDEX: 30.83 KG/M2 | OXYGEN SATURATION: 94 %

## 2018-01-29 DIAGNOSIS — J20.9 ACUTE BRONCHITIS, UNSPECIFIED ORGANISM: Primary | ICD-10-CM

## 2018-01-29 DIAGNOSIS — Z12.39 BREAST CANCER SCREENING: ICD-10-CM

## 2018-01-29 PROCEDURE — 3014F SCREEN MAMMO DOC REV: CPT | Performed by: INTERNAL MEDICINE

## 2018-01-29 PROCEDURE — G8427 DOCREV CUR MEDS BY ELIG CLIN: HCPCS | Performed by: INTERNAL MEDICINE

## 2018-01-29 PROCEDURE — G8484 FLU IMMUNIZE NO ADMIN: HCPCS | Performed by: INTERNAL MEDICINE

## 2018-01-29 PROCEDURE — G8417 CALC BMI ABV UP PARAM F/U: HCPCS | Performed by: INTERNAL MEDICINE

## 2018-01-29 PROCEDURE — 1036F TOBACCO NON-USER: CPT | Performed by: INTERNAL MEDICINE

## 2018-01-29 PROCEDURE — 3017F COLORECTAL CA SCREEN DOC REV: CPT | Performed by: INTERNAL MEDICINE

## 2018-01-29 PROCEDURE — 99213 OFFICE O/P EST LOW 20 MIN: CPT | Performed by: INTERNAL MEDICINE

## 2018-01-29 RX ORDER — METHYLPREDNISOLONE 4 MG/1
TABLET ORAL
Qty: 1 KIT | Refills: 0 | Status: SHIPPED | OUTPATIENT
Start: 2018-01-29 | End: 2018-02-28

## 2018-01-29 RX ORDER — BENZONATATE 100 MG/1
100 CAPSULE ORAL 3 TIMES DAILY PRN
Qty: 30 CAPSULE | Refills: 0 | Status: SHIPPED | OUTPATIENT
Start: 2018-01-29 | End: 2018-02-08

## 2018-01-29 ASSESSMENT — ENCOUNTER SYMPTOMS
SINUS PAIN: 0
EYE DISCHARGE: 0
COUGH: 1
WHEEZING: 0
SINUS PRESSURE: 0
CHEST TIGHTNESS: 0
SHORTNESS OF BREATH: 0
GASTROINTESTINAL NEGATIVE: 1
TROUBLE SWALLOWING: 0
CHOKING: 0
VOICE CHANGE: 0

## 2018-01-29 NOTE — PROGRESS NOTES
1.0 - 4.8 K/uL Final    Monocytes # 11/17/2017 0.4  0.2 - 0.8 K/uL Final    Eosinophils # 11/17/2017 0.0  0.0 - 0.7 K/uL Final    Basophils # 11/17/2017 0.0  0.0 - 0.2 K/uL Final    Anisocytosis 11/17/2017 1+   Final    Macrocytes 11/17/2017 0   Final    Microcytes 11/17/2017 0   Final    Polychromasia 11/17/2017 0   Final    Hypochromia 11/17/2017 0   Final    Poikilocytes 11/17/2017 0   Final    Sodium 11/17/2017 140  132 - 144 mEq/L Final    Potassium 11/17/2017 4.3  3.5 - 5.1 mEq/L Final    Chloride 11/17/2017 99  98 - 107 mEq/L Final    CO2 11/17/2017 22  22 - 29 mEq/L Final    Anion Gap 11/17/2017 19* 7 - 13 mEq/L Final    Glucose 11/17/2017 232* 74 - 109 mg/dL Final    BUN 11/17/2017 17  6 - 20 mg/dL Final    CREATININE 11/17/2017 0.66  0.50 - 0.90 mg/dL Final    GFR Non- 11/17/2017 >60.0  >60 Final    Comment: >60 mL/min/1.73m2 EGFR, calc. for ages 25 and older using the  MDRD formula (not corrected for weight), is valid for stable  renal function.  GFR  11/17/2017 >60.0  >60 Final    Comment: >60 mL/min/1.73m2 EGFR, calc. for ages 25 and older using the  MDRD formula (not corrected for weight), is valid for stable  renal function.  Calcium 11/17/2017 9.8  8.6 - 10.2 mg/dL Final       HPI:    Cough   This is a new problem. The current episode started 1 to 4 weeks ago. The problem has been gradually improving. The cough is productive of sputum. Pertinent negatives include no chest pain, chills, fever, myalgias, shortness of breath or wheezing. The symptoms are aggravated by cold air, lying down and exercise. Risk factors for lung disease include smoking/tobacco exposure. She has tried a beta-agonist inhaler, OTC cough suppressant and rest for the symptoms. The treatment provided moderate relief. Her past medical history is significant for bronchitis, COPD and emphysema.        More detail above in the chief complaint(s) and below in the review of answered. Side effects, adverse effects of the medication prescribed (or refilled) today, as well as treatment plan/ rationale and result expectations have (again) been discussed with the patient who expresses understanding and consents to proceed as outlined above. Additional advise included in the \"after visit summary\". Orders Placed This Encounter   Medications    methylPREDNISolone (MEDROL DOSEPACK) 4 MG tablet     Sig: Take po daily AD on pack     Dispense:  1 kit     Refill:  0    benzonatate (TESSALON PERLES) 100 MG capsule     Sig: Take 1 capsule by mouth 3 times daily as needed for Cough     Dispense:  30 capsule     Refill:  0   Patient will call if the prescribed treatment does not help relieve symptoms of the presenting condition (or for any medication-related adverse effects), or go to the ER if symptoms worsen. Further workup and plan will be determined based on clinical progression and follow up test/ treatment results. Orders Placed This Encounter   Procedures    ERNESTO DIGITAL SCREEN W CAD BILATERAL     Standing Status:   Future     Standing Expiration Date:   1/29/2019       Close follow up needed to evaluate treatment results and for care coordination. Return in about 6 months (around 7/29/2018). I have reviewed the patient's medical and surgical, family and social history, health maintenance schedule, and updated the computerized patient record. Please note this report has been partially produced by using speech recognition hardware. It may contain errors related to the system, including grammar, punctuation and spelling as well as words and phrases that may seem inaccurate. For any questions or concerns, please feel free to contact me for clarification.         Electronically signed by Rodolfo Chowdary MD

## 2018-02-09 ENCOUNTER — HOSPITAL ENCOUNTER (OUTPATIENT)
Dept: PULMONOLOGY | Age: 53
Discharge: HOME | End: 2018-02-09
Payer: MEDICARE

## 2018-02-09 DIAGNOSIS — J44.1 CHRONIC OBSTRUCTIVE PULMONARY DISEASE WITH ACUTE EXACERBATION (HCC): ICD-10-CM

## 2018-02-09 PROCEDURE — 6360000002 HC RX W HCPCS: Performed by: INTERNAL MEDICINE

## 2018-02-09 PROCEDURE — 94726 PLETHYSMOGRAPHY LUNG VOLUMES: CPT | Performed by: INTERNAL MEDICINE

## 2018-02-09 PROCEDURE — 94060 EVALUATION OF WHEEZING: CPT

## 2018-02-09 PROCEDURE — 94727 GAS DIL/WSHOT DETER LNG VOL: CPT

## 2018-02-09 PROCEDURE — 94729 DIFFUSING CAPACITY: CPT

## 2018-02-09 PROCEDURE — 94729 DIFFUSING CAPACITY: CPT | Performed by: INTERNAL MEDICINE

## 2018-02-09 PROCEDURE — 94060 EVALUATION OF WHEEZING: CPT | Performed by: INTERNAL MEDICINE

## 2018-02-09 RX ORDER — ALBUTEROL SULFATE 2.5 MG/3ML
2.5 SOLUTION RESPIRATORY (INHALATION) ONCE
Status: COMPLETED | OUTPATIENT
Start: 2018-02-09 | End: 2018-02-09

## 2018-02-09 RX ADMIN — ALBUTEROL SULFATE 2.5 MG: 2.5 SOLUTION RESPIRATORY (INHALATION) at 13:30

## 2018-02-28 ENCOUNTER — OFFICE VISIT (OUTPATIENT)
Dept: PULMONOLOGY | Age: 53
End: 2018-02-28
Payer: MEDICARE

## 2018-02-28 VITALS
WEIGHT: 177.2 LBS | SYSTOLIC BLOOD PRESSURE: 118 MMHG | HEIGHT: 64 IN | OXYGEN SATURATION: 94 % | BODY MASS INDEX: 30.25 KG/M2 | HEART RATE: 79 BPM | DIASTOLIC BLOOD PRESSURE: 80 MMHG | TEMPERATURE: 97.8 F

## 2018-02-28 DIAGNOSIS — J44.1 CHRONIC OBSTRUCTIVE PULMONARY DISEASE WITH ACUTE EXACERBATION (HCC): Primary | ICD-10-CM

## 2018-02-28 PROCEDURE — 99214 OFFICE O/P EST MOD 30 MIN: CPT | Performed by: INTERNAL MEDICINE

## 2018-02-28 PROCEDURE — 1036F TOBACCO NON-USER: CPT | Performed by: INTERNAL MEDICINE

## 2018-02-28 PROCEDURE — G8417 CALC BMI ABV UP PARAM F/U: HCPCS | Performed by: INTERNAL MEDICINE

## 2018-02-28 PROCEDURE — G8484 FLU IMMUNIZE NO ADMIN: HCPCS | Performed by: INTERNAL MEDICINE

## 2018-02-28 PROCEDURE — 3014F SCREEN MAMMO DOC REV: CPT | Performed by: INTERNAL MEDICINE

## 2018-02-28 PROCEDURE — 3017F COLORECTAL CA SCREEN DOC REV: CPT | Performed by: INTERNAL MEDICINE

## 2018-02-28 PROCEDURE — G8427 DOCREV CUR MEDS BY ELIG CLIN: HCPCS | Performed by: INTERNAL MEDICINE

## 2018-02-28 PROCEDURE — 3023F SPIROM DOC REV: CPT | Performed by: INTERNAL MEDICINE

## 2018-02-28 PROCEDURE — G8926 SPIRO NO PERF OR DOC: HCPCS | Performed by: INTERNAL MEDICINE

## 2018-02-28 RX ORDER — AZITHROMYCIN 250 MG/1
TABLET, FILM COATED ORAL
Qty: 1 PACKET | Refills: 0 | Status: SHIPPED | OUTPATIENT
Start: 2018-02-28 | End: 2018-03-10

## 2018-02-28 RX ORDER — PREDNISONE 10 MG/1
TABLET ORAL
Qty: 24 TABLET | Refills: 0 | Status: SHIPPED | OUTPATIENT
Start: 2018-02-28 | End: 2018-04-25

## 2018-02-28 ASSESSMENT — ENCOUNTER SYMPTOMS
SINUS PRESSURE: 0
VOMITING: 0
COUGH: 1
CHEST TIGHTNESS: 1
NAUSEA: 0
SORE THROAT: 0
DIARRHEA: 0
RHINORRHEA: 0
WHEEZING: 0
SHORTNESS OF BREATH: 0
ABDOMINAL PAIN: 0

## 2018-02-28 NOTE — PROGRESS NOTES
Subjective: Aaliyah Chambers is a 47 y.o. female who complains today of:     Chief Complaint   Patient presents with    COPD     pt here for follow up on copd. pt had breqathing test completed. discuss results       HPI  Patient is here for a follow-up visit regarding COPD. Since the last visit patient did well until about a week ago when she developed increased cough associated with clear sputum production occasional wheezing. She has quit smoking since her hospital stay and has not had any other issues. She is using Symbicort regularly.   Her PFTs showed only mild obstructive pattern with mild overinflation    Allergies:  Morphine; Abilify [aripiprazole]; and Effexor [venlafaxine]  Past Medical History:   Diagnosis Date    Chronic back pain greater than 3 months duration 2012    Dr Isa Flowers COPD (chronic obstructive pulmonary disease) [facility]) 2017    Dr Aguiar Members Depression 2010    Dr Sofya Landry [facility]), hospital admissions    Family history of malignant neoplasm of breast     H/O vitamin D deficiency      Past Surgical History:   Procedure Laterality Date    BREAST CYST EXCISION Right 2000    benign    DILATION AND CURETTAGE OF UTERUS      TONSILLECTOMY       Family History   Problem Relation Age of Onset    Heart Failure Mother      dec 80    Osteoarthritis Mother     Cancer Mother 61     breast     Heart Attack Father      dec 58    Hypertension Father     High Cholesterol Father     Cancer Sister 64     pancreas, survived   [facility] Depression Sister      Social History     Social History    Marital status: Single     Spouse name: N/A    Number of children: 2    Years of education: N/A     Occupational History    , now on [employer] History Main Topics    Smoking status: Former Smoker     Packs/day: 1.00     Types: Cigarettes     Quit date: 11/12/2017    Smokeless tobacco: Never Used      Comment: pt trying to cut back    Alcohol use 0.0 oz/week    Drug use: No    exhibits no distension. Musculoskeletal: Normal range of motion. She exhibits no edema. Neurological: She is alert and oriented to person, place, and time. Coordination normal.   Skin: Skin is warm and dry. No rash noted. Psychiatric: She has a normal mood and affect. Assessment:     1. Chronic obstructive pulmonary disease with acute exacerbation (HCC)       Patient has underlying mild COPD she is an ex-smoker, flareups of COPD, triggers, and appropriate management were discussed today at length      Plan:     No orders of the defined types were placed in this encounter. Orders Placed This Encounter   Medications    azithromycin (ZITHROMAX) 250 MG tablet     Sig: Take 2 tabs (500 mg) on Day 1, and take 1 tab (250 mg) on days 2 through 5. Dispense:  1 packet     Refill:  0    predniSONE (DELTASONE) 10 MG tablet     Si tablets daily for 4 days, then 2 tablets daily for 4 days     Dispense:  24 tablet     Refill:  0           Return in about 4 months (around 2018) for re-evaluation.       Aidan Fraser MD

## 2018-03-30 ENCOUNTER — HOSPITAL ENCOUNTER (OUTPATIENT)
Dept: WOMENS IMAGING | Age: 53
Discharge: HOME | End: 2018-04-01
Payer: MEDICARE

## 2018-03-30 DIAGNOSIS — Z12.39 BREAST CANCER SCREENING: ICD-10-CM

## 2018-03-30 DIAGNOSIS — R92.8 ABNORMAL MAMMOGRAM OF LEFT BREAST: Primary | ICD-10-CM

## 2018-03-30 PROCEDURE — 77067 SCR MAMMO BI INCL CAD: CPT

## 2018-04-23 ENCOUNTER — HOSPITAL ENCOUNTER (OUTPATIENT)
Dept: ULTRASOUND IMAGING | Age: 53
Discharge: HOME | End: 2018-04-25
Payer: MEDICARE

## 2018-04-23 ENCOUNTER — HOSPITAL ENCOUNTER (OUTPATIENT)
Dept: WOMENS IMAGING | Age: 53
Discharge: HOME | End: 2018-04-25
Payer: MEDICARE

## 2018-04-23 DIAGNOSIS — R92.8 ABNORMAL MAMMOGRAM OF LEFT BREAST: ICD-10-CM

## 2018-04-23 PROCEDURE — G0279 TOMOSYNTHESIS, MAMMO: HCPCS

## 2018-04-23 PROCEDURE — 76642 ULTRASOUND BREAST LIMITED: CPT

## 2018-04-25 ENCOUNTER — OFFICE VISIT (OUTPATIENT)
Dept: SURGERY | Age: 53
End: 2018-04-25
Payer: MEDICARE

## 2018-04-25 VITALS
DIASTOLIC BLOOD PRESSURE: 78 MMHG | WEIGHT: 177 LBS | SYSTOLIC BLOOD PRESSURE: 124 MMHG | TEMPERATURE: 98.2 F | BODY MASS INDEX: 31.36 KG/M2 | HEIGHT: 63 IN

## 2018-04-25 DIAGNOSIS — R92.8 ABNORMAL MAMMOGRAM: ICD-10-CM

## 2018-04-25 DIAGNOSIS — R92.8 ABNORMAL ULTRASOUND OF BREAST: Primary | ICD-10-CM

## 2018-04-25 PROCEDURE — 99201 PR OFFICE OUTPATIENT NEW 10 MINUTES: CPT | Performed by: SURGERY

## 2018-04-25 PROCEDURE — 3017F COLORECTAL CA SCREEN DOC REV: CPT | Performed by: SURGERY

## 2018-04-25 PROCEDURE — G8417 CALC BMI ABV UP PARAM F/U: HCPCS | Performed by: SURGERY

## 2018-04-25 PROCEDURE — 1036F TOBACCO NON-USER: CPT | Performed by: SURGERY

## 2018-04-25 PROCEDURE — G8427 DOCREV CUR MEDS BY ELIG CLIN: HCPCS | Performed by: SURGERY

## 2018-04-26 DIAGNOSIS — F41.9 ANXIETY: ICD-10-CM

## 2018-04-26 RX ORDER — FLUOXETINE 20 MG/1
20 TABLET, FILM COATED ORAL DAILY
Qty: 30 TABLET | Refills: 3 | Status: SHIPPED | OUTPATIENT
Start: 2018-04-26 | End: 2018-11-06 | Stop reason: SDUPTHER

## 2018-04-28 PROBLEM — R92.8 ABNORMAL ULTRASOUND OF BREAST: Status: ACTIVE | Noted: 2018-04-28

## 2018-04-28 PROBLEM — R92.8 ABNORMAL MAMMOGRAM: Status: ACTIVE | Noted: 2018-04-28

## 2018-05-01 ENCOUNTER — HOSPITAL ENCOUNTER (OUTPATIENT)
Dept: ULTRASOUND IMAGING | Age: 53
Discharge: HOME | End: 2018-05-03
Payer: MEDICARE

## 2018-05-01 ENCOUNTER — PREP FOR PROCEDURE (OUTPATIENT)
Dept: WOMENS IMAGING | Age: 53
End: 2018-05-01

## 2018-05-01 DIAGNOSIS — R92.8 ABNORMAL ULTRASOUND OF BREAST: ICD-10-CM

## 2018-05-01 RX ORDER — SODIUM CHLORIDE 9 MG/ML
INJECTION, SOLUTION INTRAVENOUS CONTINUOUS
Status: CANCELLED | OUTPATIENT
Start: 2018-05-01

## 2018-05-01 RX ORDER — LIDOCAINE HYDROCHLORIDE 10 MG/ML
30 INJECTION, SOLUTION INFILTRATION; PERINEURAL ONCE
Status: CANCELLED | OUTPATIENT
Start: 2018-05-01 | End: 2018-05-01

## 2018-05-08 ENCOUNTER — HOSPITAL ENCOUNTER (OUTPATIENT)
Dept: PREADMISSION TESTING | Age: 53
Discharge: HOME | End: 2018-05-12
Payer: MEDICARE

## 2018-05-08 VITALS
OXYGEN SATURATION: 95 % | WEIGHT: 178 LBS | RESPIRATION RATE: 18 BRPM | HEIGHT: 64 IN | DIASTOLIC BLOOD PRESSURE: 81 MMHG | TEMPERATURE: 98 F | SYSTOLIC BLOOD PRESSURE: 122 MMHG | HEART RATE: 82 BPM | BODY MASS INDEX: 30.39 KG/M2

## 2018-05-08 DIAGNOSIS — R92.8 ABNORMAL ULTRASOUND OF BREAST: ICD-10-CM

## 2018-05-08 LAB
ALBUMIN SERPL-MCNC: 4.6 G/DL (ref 3.9–4.9)
ALP BLD-CCNC: 68 U/L (ref 40–130)
ALT SERPL-CCNC: 14 U/L (ref 0–33)
ANION GAP SERPL CALCULATED.3IONS-SCNC: 15 MEQ/L (ref 7–13)
AST SERPL-CCNC: 20 U/L (ref 0–35)
BILIRUB SERPL-MCNC: <0.2 MG/DL (ref 0–1.2)
BUN BLDV-MCNC: 11 MG/DL (ref 6–20)
CALCIUM SERPL-MCNC: 9.1 MG/DL (ref 8.6–10.2)
CHLORIDE BLD-SCNC: 103 MEQ/L (ref 98–107)
CO2: 23 MEQ/L (ref 22–29)
CREAT SERPL-MCNC: 0.58 MG/DL (ref 0.5–0.9)
EKG ATRIAL RATE: 72 BPM
EKG P AXIS: 53 DEGREES
EKG P-R INTERVAL: 140 MS
EKG Q-T INTERVAL: 432 MS
EKG QRS DURATION: 84 MS
EKG QTC CALCULATION (BAZETT): 473 MS
EKG R AXIS: 55 DEGREES
EKG T AXIS: 53 DEGREES
EKG VENTRICULAR RATE: 72 BPM
GFR AFRICAN AMERICAN: >60
GFR NON-AFRICAN AMERICAN: >60
GLOBULIN: 2.1 G/DL (ref 2.3–3.5)
GLUCOSE BLD-MCNC: 61 MG/DL (ref 74–109)
HCT VFR BLD CALC: 41.2 % (ref 37–47)
HEMOGLOBIN: 13.8 G/DL (ref 12–16)
MCH RBC QN AUTO: 31.8 PG (ref 27–31.3)
MCHC RBC AUTO-ENTMCNC: 33.5 % (ref 33–37)
MCV RBC AUTO: 95 FL (ref 82–100)
PDW BLD-RTO: 13.5 % (ref 11.5–14.5)
PLATELET # BLD: 308 K/UL (ref 130–400)
POTASSIUM SERPL-SCNC: 4.5 MEQ/L (ref 3.5–5.1)
RBC # BLD: 4.34 M/UL (ref 4.2–5.4)
SODIUM BLD-SCNC: 141 MEQ/L (ref 132–144)
TOTAL PROTEIN: 6.7 G/DL (ref 6.4–8.1)
WBC # BLD: 12.3 K/UL (ref 4.8–10.8)

## 2018-05-08 PROCEDURE — 80053 COMPREHEN METABOLIC PANEL: CPT

## 2018-05-08 PROCEDURE — 93005 ELECTROCARDIOGRAM TRACING: CPT

## 2018-05-08 PROCEDURE — 85027 COMPLETE CBC AUTOMATED: CPT

## 2018-05-08 RX ORDER — LIDOCAINE HYDROCHLORIDE 10 MG/ML
1 INJECTION, SOLUTION EPIDURAL; INFILTRATION; INTRACAUDAL; PERINEURAL
Status: CANCELLED | OUTPATIENT
Start: 2018-05-08 | End: 2018-05-08

## 2018-05-08 RX ORDER — SODIUM CHLORIDE 0.9 % (FLUSH) 0.9 %
10 SYRINGE (ML) INJECTION EVERY 12 HOURS SCHEDULED
Status: CANCELLED | OUTPATIENT
Start: 2018-05-08

## 2018-05-08 RX ORDER — SODIUM CHLORIDE, SODIUM LACTATE, POTASSIUM CHLORIDE, CALCIUM CHLORIDE 600; 310; 30; 20 MG/100ML; MG/100ML; MG/100ML; MG/100ML
INJECTION, SOLUTION INTRAVENOUS CONTINUOUS
Status: CANCELLED | OUTPATIENT
Start: 2018-05-08

## 2018-05-08 RX ORDER — SODIUM CHLORIDE 9 MG/ML
INJECTION, SOLUTION INTRAVENOUS CONTINUOUS
Status: CANCELLED | OUTPATIENT
Start: 2018-05-11

## 2018-05-08 RX ORDER — SODIUM CHLORIDE 0.9 % (FLUSH) 0.9 %
10 SYRINGE (ML) INJECTION PRN
Status: CANCELLED | OUTPATIENT
Start: 2018-05-08

## 2018-05-10 PROCEDURE — 93010 ELECTROCARDIOGRAM REPORT: CPT | Performed by: INTERNAL MEDICINE

## 2018-05-11 ENCOUNTER — APPOINTMENT (OUTPATIENT)
Dept: ULTRASOUND IMAGING | Age: 53
End: 2018-05-11
Attending: SURGERY
Payer: MEDICARE

## 2018-05-11 ENCOUNTER — HOSPITAL ENCOUNTER (OUTPATIENT)
Age: 53
Setting detail: OUTPATIENT SURGERY
Discharge: HOME | End: 2018-05-11
Attending: SURGERY | Admitting: SURGERY
Payer: MEDICARE

## 2018-05-11 ENCOUNTER — ANESTHESIA (OUTPATIENT)
Dept: OPERATING ROOM | Age: 53
End: 2018-05-11
Payer: MEDICARE

## 2018-05-11 ENCOUNTER — ANESTHESIA EVENT (OUTPATIENT)
Dept: OPERATING ROOM | Age: 53
End: 2018-05-11
Payer: MEDICARE

## 2018-05-11 VITALS — SYSTOLIC BLOOD PRESSURE: 89 MMHG | TEMPERATURE: 96.3 F | OXYGEN SATURATION: 99 % | DIASTOLIC BLOOD PRESSURE: 53 MMHG

## 2018-05-11 VITALS
RESPIRATION RATE: 20 BRPM | SYSTOLIC BLOOD PRESSURE: 125 MMHG | TEMPERATURE: 97.5 F | HEART RATE: 71 BPM | DIASTOLIC BLOOD PRESSURE: 64 MMHG | OXYGEN SATURATION: 96 %

## 2018-05-11 DIAGNOSIS — R92.8 ABNORMAL ULTRASOUND OF BREAST: Primary | ICD-10-CM

## 2018-05-11 DIAGNOSIS — G89.18 POST-OP PAIN: ICD-10-CM

## 2018-05-11 PROCEDURE — 2780000010 HC IMPLANT OTHER: Performed by: SURGERY

## 2018-05-11 PROCEDURE — 6360000002 HC RX W HCPCS: Performed by: NURSE ANESTHETIST, CERTIFIED REGISTERED

## 2018-05-11 PROCEDURE — 6360000002 HC RX W HCPCS: Performed by: NURSE PRACTITIONER

## 2018-05-11 PROCEDURE — 3700000001 HC ADD 15 MINUTES (ANESTHESIA): Performed by: SURGERY

## 2018-05-11 PROCEDURE — 2500000003 HC RX 250 WO HCPCS: Performed by: NURSE ANESTHETIST, CERTIFIED REGISTERED

## 2018-05-11 PROCEDURE — 6370000000 HC RX 637 (ALT 250 FOR IP): Performed by: SURGERY

## 2018-05-11 PROCEDURE — 3600000005 HC SURGERY LEVEL 5 BASE: Performed by: SURGERY

## 2018-05-11 PROCEDURE — 3600000015 HC SURGERY LEVEL 5 ADDTL 15MIN: Performed by: SURGERY

## 2018-05-11 PROCEDURE — 76942 ECHO GUIDE FOR BIOPSY: CPT

## 2018-05-11 PROCEDURE — 2500000003 HC RX 250 WO HCPCS: Performed by: NURSE PRACTITIONER

## 2018-05-11 PROCEDURE — 2580000003 HC RX 258: Performed by: SURGERY

## 2018-05-11 PROCEDURE — 2580000003 HC RX 258: Performed by: NURSE PRACTITIONER

## 2018-05-11 PROCEDURE — 7100000001 HC PACU RECOVERY - ADDTL 15 MIN: Performed by: SURGERY

## 2018-05-11 PROCEDURE — 7100000011 HC PHASE II RECOVERY - ADDTL 15 MIN: Performed by: SURGERY

## 2018-05-11 PROCEDURE — A6402 STERILE GAUZE <= 16 SQ IN: HCPCS | Performed by: SURGERY

## 2018-05-11 PROCEDURE — 6360000002 HC RX W HCPCS: Performed by: ANESTHESIOLOGY

## 2018-05-11 PROCEDURE — 19125 EXCISION BREAST LESION: CPT | Performed by: SURGERY

## 2018-05-11 PROCEDURE — 3700000000 HC ANESTHESIA ATTENDED CARE: Performed by: SURGERY

## 2018-05-11 PROCEDURE — 7100000010 HC PHASE II RECOVERY - FIRST 15 MIN: Performed by: SURGERY

## 2018-05-11 PROCEDURE — 7100000000 HC PACU RECOVERY - FIRST 15 MIN: Performed by: SURGERY

## 2018-05-11 RX ORDER — ACETAMINOPHEN 325 MG/1
650 TABLET ORAL EVERY 4 HOURS PRN
Status: DISCONTINUED | OUTPATIENT
Start: 2018-05-11 | End: 2018-05-11 | Stop reason: HOSPADM

## 2018-05-11 RX ORDER — ONDANSETRON 2 MG/ML
INJECTION INTRAMUSCULAR; INTRAVENOUS PRN
Status: DISCONTINUED | OUTPATIENT
Start: 2018-05-11 | End: 2018-05-11 | Stop reason: SDUPTHER

## 2018-05-11 RX ORDER — SODIUM CHLORIDE 0.9 % (FLUSH) 0.9 %
10 SYRINGE (ML) INJECTION EVERY 12 HOURS SCHEDULED
Status: DISCONTINUED | OUTPATIENT
Start: 2018-05-11 | End: 2018-05-11 | Stop reason: HOSPADM

## 2018-05-11 RX ORDER — ONDANSETRON 2 MG/ML
4 INJECTION INTRAMUSCULAR; INTRAVENOUS
Status: DISCONTINUED | OUTPATIENT
Start: 2018-05-11 | End: 2018-05-11 | Stop reason: HOSPADM

## 2018-05-11 RX ORDER — MEPERIDINE HYDROCHLORIDE 25 MG/ML
12.5 INJECTION INTRAMUSCULAR; INTRAVENOUS; SUBCUTANEOUS EVERY 5 MIN PRN
Status: DISCONTINUED | OUTPATIENT
Start: 2018-05-11 | End: 2018-05-11 | Stop reason: HOSPADM

## 2018-05-11 RX ORDER — FENTANYL CITRATE 50 UG/ML
50 INJECTION, SOLUTION INTRAMUSCULAR; INTRAVENOUS EVERY 10 MIN PRN
Status: DISCONTINUED | OUTPATIENT
Start: 2018-05-11 | End: 2018-05-11 | Stop reason: HOSPADM

## 2018-05-11 RX ORDER — HYDROCODONE BITARTRATE AND ACETAMINOPHEN 5; 325 MG/1; MG/1
2 TABLET ORAL EVERY 4 HOURS PRN
Status: DISCONTINUED | OUTPATIENT
Start: 2018-05-11 | End: 2018-05-11 | Stop reason: HOSPADM

## 2018-05-11 RX ORDER — SODIUM CHLORIDE 9 MG/ML
INJECTION, SOLUTION INTRAVENOUS CONTINUOUS
Status: DISCONTINUED | OUTPATIENT
Start: 2018-05-11 | End: 2018-05-11 | Stop reason: HOSPADM

## 2018-05-11 RX ORDER — FENTANYL CITRATE 50 UG/ML
INJECTION, SOLUTION INTRAMUSCULAR; INTRAVENOUS PRN
Status: DISCONTINUED | OUTPATIENT
Start: 2018-05-11 | End: 2018-05-11 | Stop reason: SDUPTHER

## 2018-05-11 RX ORDER — MIDAZOLAM HYDROCHLORIDE 1 MG/ML
INJECTION INTRAMUSCULAR; INTRAVENOUS PRN
Status: DISCONTINUED | OUTPATIENT
Start: 2018-05-11 | End: 2018-05-11 | Stop reason: SDUPTHER

## 2018-05-11 RX ORDER — WOUND DRESSING ADHESIVE - LIQUID
LIQUID MISCELLANEOUS PRN
Status: DISCONTINUED | OUTPATIENT
Start: 2018-05-11 | End: 2018-05-11 | Stop reason: HOSPADM

## 2018-05-11 RX ORDER — DIPHENHYDRAMINE HYDROCHLORIDE 50 MG/ML
12.5 INJECTION INTRAMUSCULAR; INTRAVENOUS
Status: DISCONTINUED | OUTPATIENT
Start: 2018-05-11 | End: 2018-05-11 | Stop reason: HOSPADM

## 2018-05-11 RX ORDER — LIDOCAINE HYDROCHLORIDE 10 MG/ML
30 INJECTION, SOLUTION INFILTRATION; PERINEURAL ONCE
Status: DISCONTINUED | OUTPATIENT
Start: 2018-05-11 | End: 2018-05-11 | Stop reason: HOSPADM

## 2018-05-11 RX ORDER — SODIUM CHLORIDE 0.9 % (FLUSH) 0.9 %
10 SYRINGE (ML) INJECTION PRN
Status: DISCONTINUED | OUTPATIENT
Start: 2018-05-11 | End: 2018-05-11 | Stop reason: HOSPADM

## 2018-05-11 RX ORDER — MAGNESIUM HYDROXIDE 1200 MG/15ML
LIQUID ORAL CONTINUOUS PRN
Status: DISCONTINUED | OUTPATIENT
Start: 2018-05-11 | End: 2018-05-11 | Stop reason: HOSPADM

## 2018-05-11 RX ORDER — LIDOCAINE HYDROCHLORIDE 20 MG/ML
INJECTION, SOLUTION INFILTRATION; PERINEURAL PRN
Status: DISCONTINUED | OUTPATIENT
Start: 2018-05-11 | End: 2018-05-11 | Stop reason: SDUPTHER

## 2018-05-11 RX ORDER — LIDOCAINE HYDROCHLORIDE 10 MG/ML
1 INJECTION, SOLUTION EPIDURAL; INFILTRATION; INTRACAUDAL; PERINEURAL
Status: COMPLETED | OUTPATIENT
Start: 2018-05-11 | End: 2018-05-11

## 2018-05-11 RX ORDER — HYDROCODONE BITARTRATE AND ACETAMINOPHEN 5; 325 MG/1; MG/1
1 TABLET ORAL EVERY 6 HOURS PRN
Qty: 28 TABLET | Refills: 0 | Status: SHIPPED | OUTPATIENT
Start: 2018-05-11 | End: 2018-05-18

## 2018-05-11 RX ORDER — HYDROCODONE BITARTRATE AND ACETAMINOPHEN 5; 325 MG/1; MG/1
1 TABLET ORAL EVERY 4 HOURS PRN
Status: DISCONTINUED | OUTPATIENT
Start: 2018-05-11 | End: 2018-05-11 | Stop reason: HOSPADM

## 2018-05-11 RX ORDER — PROPOFOL 10 MG/ML
INJECTION, EMULSION INTRAVENOUS PRN
Status: DISCONTINUED | OUTPATIENT
Start: 2018-05-11 | End: 2018-05-11 | Stop reason: SDUPTHER

## 2018-05-11 RX ORDER — ONDANSETRON 2 MG/ML
4 INJECTION INTRAMUSCULAR; INTRAVENOUS EVERY 6 HOURS PRN
Status: DISCONTINUED | OUTPATIENT
Start: 2018-05-11 | End: 2018-05-11 | Stop reason: HOSPADM

## 2018-05-11 RX ORDER — DEXAMETHASONE SODIUM PHOSPHATE 10 MG/ML
INJECTION INTRAMUSCULAR; INTRAVENOUS PRN
Status: DISCONTINUED | OUTPATIENT
Start: 2018-05-11 | End: 2018-05-11 | Stop reason: SDUPTHER

## 2018-05-11 RX ORDER — ACETAMINOPHEN 650 MG/1
650 SUPPOSITORY RECTAL EVERY 4 HOURS PRN
Status: DISCONTINUED | OUTPATIENT
Start: 2018-05-11 | End: 2018-05-11 | Stop reason: HOSPADM

## 2018-05-11 RX ORDER — KETOROLAC TROMETHAMINE 30 MG/ML
INJECTION, SOLUTION INTRAMUSCULAR; INTRAVENOUS PRN
Status: DISCONTINUED | OUTPATIENT
Start: 2018-05-11 | End: 2018-05-11 | Stop reason: SDUPTHER

## 2018-05-11 RX ORDER — METOCLOPRAMIDE HYDROCHLORIDE 5 MG/ML
10 INJECTION INTRAMUSCULAR; INTRAVENOUS
Status: DISCONTINUED | OUTPATIENT
Start: 2018-05-11 | End: 2018-05-11 | Stop reason: HOSPADM

## 2018-05-11 RX ADMIN — FENTANYL CITRATE 50 MCG: 50 INJECTION, SOLUTION INTRAMUSCULAR; INTRAVENOUS at 13:36

## 2018-05-11 RX ADMIN — FENTANYL CITRATE 50 MCG: 50 INJECTION INTRAMUSCULAR; INTRAVENOUS at 14:53

## 2018-05-11 RX ADMIN — FENTANYL CITRATE 50 MCG: 50 INJECTION, SOLUTION INTRAMUSCULAR; INTRAVENOUS at 13:46

## 2018-05-11 RX ADMIN — FENTANYL CITRATE 50 MCG: 50 INJECTION INTRAMUSCULAR; INTRAVENOUS at 14:40

## 2018-05-11 RX ADMIN — HYDROCODONE BITARTRATE AND ACETAMINOPHEN 2 TABLET: 5; 325 TABLET ORAL at 15:37

## 2018-05-11 RX ADMIN — LIDOCAINE HYDROCHLORIDE 0.1 ML: 10 INJECTION, SOLUTION EPIDURAL; INFILTRATION; INTRACAUDAL; PERINEURAL at 12:31

## 2018-05-11 RX ADMIN — DEXAMETHASONE SODIUM PHOSPHATE 5 MG: 10 INJECTION INTRAMUSCULAR; INTRAVENOUS at 13:39

## 2018-05-11 RX ADMIN — FENTANYL CITRATE 25 MCG: 50 INJECTION, SOLUTION INTRAMUSCULAR; INTRAVENOUS at 14:10

## 2018-05-11 RX ADMIN — ONDANSETRON 4 MG: 2 INJECTION INTRAMUSCULAR; INTRAVENOUS at 13:56

## 2018-05-11 RX ADMIN — FENTANYL CITRATE 50 MCG: 50 INJECTION INTRAMUSCULAR; INTRAVENOUS at 14:25

## 2018-05-11 RX ADMIN — CEFTRIAXONE 1 G: 1 INJECTION, POWDER, FOR SOLUTION INTRAMUSCULAR; INTRAVENOUS at 13:28

## 2018-05-11 RX ADMIN — FENTANYL CITRATE 25 MCG: 50 INJECTION, SOLUTION INTRAMUSCULAR; INTRAVENOUS at 14:02

## 2018-05-11 RX ADMIN — FENTANYL CITRATE 50 MCG: 50 INJECTION, SOLUTION INTRAMUSCULAR; INTRAVENOUS at 13:32

## 2018-05-11 RX ADMIN — MIDAZOLAM HYDROCHLORIDE 2 MG: 1 INJECTION, SOLUTION INTRAMUSCULAR; INTRAVENOUS at 13:27

## 2018-05-11 RX ADMIN — SODIUM CHLORIDE: 9 INJECTION, SOLUTION INTRAVENOUS at 12:32

## 2018-05-11 RX ADMIN — KETOROLAC TROMETHAMINE 30 MG: 30 INJECTION, SOLUTION INTRAMUSCULAR; INTRAVENOUS at 13:59

## 2018-05-11 RX ADMIN — LIDOCAINE HYDROCHLORIDE 60 MG: 20 INJECTION, SOLUTION INFILTRATION; PERINEURAL at 13:32

## 2018-05-11 RX ADMIN — PROPOFOL 200 MG: 10 INJECTION, EMULSION INTRAVENOUS at 13:32

## 2018-05-11 ASSESSMENT — PULMONARY FUNCTION TESTS
PIF_VALUE: 7
PIF_VALUE: 3
PIF_VALUE: 4
PIF_VALUE: 2
PIF_VALUE: 9
PIF_VALUE: 6
PIF_VALUE: 2
PIF_VALUE: 3
PIF_VALUE: 0
PIF_VALUE: 2
PIF_VALUE: 0
PIF_VALUE: 2
PIF_VALUE: 9
PIF_VALUE: 2
PIF_VALUE: 8
PIF_VALUE: 2
PIF_VALUE: 0
PIF_VALUE: 2
PIF_VALUE: 2
PIF_VALUE: 9
PIF_VALUE: 2
PIF_VALUE: 10
PIF_VALUE: 3
PIF_VALUE: 2
PIF_VALUE: 1
PIF_VALUE: 22
PIF_VALUE: 2
PIF_VALUE: 0
PIF_VALUE: 1
PIF_VALUE: 2
PIF_VALUE: 9
PIF_VALUE: 9
PIF_VALUE: 2
PIF_VALUE: 11

## 2018-05-11 ASSESSMENT — PAIN SCALES - GENERAL
PAINLEVEL_OUTOF10: 7
PAINLEVEL_OUTOF10: 8
PAINLEVEL_OUTOF10: 5

## 2018-05-11 ASSESSMENT — PAIN - FUNCTIONAL ASSESSMENT: PAIN_FUNCTIONAL_ASSESSMENT: 0-10

## 2018-05-11 ASSESSMENT — COPD QUESTIONNAIRES: CAT_SEVERITY: MILD

## 2018-05-23 ENCOUNTER — OFFICE VISIT (OUTPATIENT)
Dept: SURGERY | Age: 53
End: 2018-05-23

## 2018-05-23 VITALS
HEIGHT: 64 IN | TEMPERATURE: 98.7 F | DIASTOLIC BLOOD PRESSURE: 84 MMHG | SYSTOLIC BLOOD PRESSURE: 120 MMHG | BODY MASS INDEX: 30.22 KG/M2 | WEIGHT: 177 LBS

## 2018-05-23 DIAGNOSIS — C50.512 MALIGNANT NEOPLASM OF LOWER-OUTER QUADRANT OF LEFT FEMALE BREAST, UNSPECIFIED ESTROGEN RECEPTOR STATUS (HCC): Primary | ICD-10-CM

## 2018-05-23 PROCEDURE — 99024 POSTOP FOLLOW-UP VISIT: CPT | Performed by: SURGERY

## 2018-05-25 RX ORDER — SODIUM CHLORIDE, SODIUM LACTATE, POTASSIUM CHLORIDE, CALCIUM CHLORIDE 600; 310; 30; 20 MG/100ML; MG/100ML; MG/100ML; MG/100ML
INJECTION, SOLUTION INTRAVENOUS CONTINUOUS
Status: CANCELLED | OUTPATIENT
Start: 2018-05-25

## 2018-05-29 ENCOUNTER — ANESTHESIA (OUTPATIENT)
Dept: OPERATING ROOM | Age: 53
End: 2018-05-29
Payer: MEDICARE

## 2018-05-29 ENCOUNTER — ANESTHESIA EVENT (OUTPATIENT)
Dept: OPERATING ROOM | Age: 53
End: 2018-05-29
Payer: MEDICARE

## 2018-05-29 ENCOUNTER — HOSPITAL ENCOUNTER (OUTPATIENT)
Age: 53
Setting detail: OUTPATIENT SURGERY
Discharge: HOME | End: 2018-05-29
Attending: SURGERY | Admitting: SURGERY
Payer: MEDICARE

## 2018-05-29 VITALS — SYSTOLIC BLOOD PRESSURE: 139 MMHG | DIASTOLIC BLOOD PRESSURE: 91 MMHG | OXYGEN SATURATION: 96 % | TEMPERATURE: 97 F

## 2018-05-29 VITALS
TEMPERATURE: 97.9 F | DIASTOLIC BLOOD PRESSURE: 68 MMHG | HEART RATE: 77 BPM | SYSTOLIC BLOOD PRESSURE: 111 MMHG | BODY MASS INDEX: 30.39 KG/M2 | HEIGHT: 64 IN | WEIGHT: 178 LBS | RESPIRATION RATE: 16 BRPM | OXYGEN SATURATION: 99 %

## 2018-05-29 DIAGNOSIS — G89.18 POST-OP PAIN: ICD-10-CM

## 2018-05-29 DIAGNOSIS — C50.512 MALIGNANT NEOPLASM OF LOWER-OUTER QUADRANT OF LEFT FEMALE BREAST, UNSPECIFIED ESTROGEN RECEPTOR STATUS (HCC): Primary | ICD-10-CM

## 2018-05-29 PROCEDURE — 2580000003 HC RX 258: Performed by: SURGERY

## 2018-05-29 PROCEDURE — 19120 REMOVAL OF BREAST LESION: CPT | Performed by: SURGERY

## 2018-05-29 PROCEDURE — 38500 BIOPSY/REMOVAL LYMPH NODES: CPT | Performed by: SURGERY

## 2018-05-29 PROCEDURE — 38900 IO MAP OF SENT LYMPH NODE: CPT | Performed by: SURGERY

## 2018-05-29 PROCEDURE — A6402 STERILE GAUZE <= 16 SQ IN: HCPCS | Performed by: SURGERY

## 2018-05-29 PROCEDURE — 2500000003 HC RX 250 WO HCPCS: Performed by: NURSE ANESTHETIST, CERTIFIED REGISTERED

## 2018-05-29 PROCEDURE — 6360000002 HC RX W HCPCS: Performed by: NURSE ANESTHETIST, CERTIFIED REGISTERED

## 2018-05-29 PROCEDURE — 6360000002 HC RX W HCPCS: Performed by: ANESTHESIOLOGY

## 2018-05-29 PROCEDURE — 6360000002 HC RX W HCPCS: Performed by: SURGERY

## 2018-05-29 PROCEDURE — 7100000011 HC PHASE II RECOVERY - ADDTL 15 MIN: Performed by: SURGERY

## 2018-05-29 PROCEDURE — 7100000010 HC PHASE II RECOVERY - FIRST 15 MIN: Performed by: SURGERY

## 2018-05-29 PROCEDURE — 6370000000 HC RX 637 (ALT 250 FOR IP): Performed by: SURGERY

## 2018-05-29 PROCEDURE — 2780000010 HC IMPLANT OTHER: Performed by: SURGERY

## 2018-05-29 PROCEDURE — 7100000001 HC PACU RECOVERY - ADDTL 15 MIN: Performed by: SURGERY

## 2018-05-29 PROCEDURE — 3700000000 HC ANESTHESIA ATTENDED CARE: Performed by: SURGERY

## 2018-05-29 PROCEDURE — 3600000012 HC SURGERY LEVEL 2 ADDTL 15MIN: Performed by: SURGERY

## 2018-05-29 PROCEDURE — 7100000000 HC PACU RECOVERY - FIRST 15 MIN: Performed by: SURGERY

## 2018-05-29 PROCEDURE — 2500000003 HC RX 250 WO HCPCS: Performed by: NURSE PRACTITIONER

## 2018-05-29 PROCEDURE — 3600000002 HC SURGERY LEVEL 2 BASE: Performed by: SURGERY

## 2018-05-29 PROCEDURE — 3700000001 HC ADD 15 MINUTES (ANESTHESIA): Performed by: SURGERY

## 2018-05-29 DEVICE — Z INVALID PART NUMBER USE 2421675 SEALANT HEMSTAT TISS 2ML FIBRIN EVICEL: Type: IMPLANTABLE DEVICE | Site: AXILLA | Status: FUNCTIONAL

## 2018-05-29 RX ORDER — OXYCODONE HYDROCHLORIDE AND ACETAMINOPHEN 5; 325 MG/1; MG/1
1 TABLET ORAL EVERY 4 HOURS PRN
Status: DISCONTINUED | OUTPATIENT
Start: 2018-05-29 | End: 2018-05-29 | Stop reason: HOSPADM

## 2018-05-29 RX ORDER — PROPOFOL 10 MG/ML
INJECTION, EMULSION INTRAVENOUS PRN
Status: DISCONTINUED | OUTPATIENT
Start: 2018-05-29 | End: 2018-05-29 | Stop reason: SDUPTHER

## 2018-05-29 RX ORDER — SODIUM CHLORIDE 0.9 % (FLUSH) 0.9 %
10 SYRINGE (ML) INJECTION EVERY 12 HOURS SCHEDULED
Status: DISCONTINUED | OUTPATIENT
Start: 2018-05-29 | End: 2018-05-29 | Stop reason: HOSPADM

## 2018-05-29 RX ORDER — MAGNESIUM HYDROXIDE 1200 MG/15ML
LIQUID ORAL CONTINUOUS PRN
Status: DISCONTINUED | OUTPATIENT
Start: 2018-05-29 | End: 2018-05-29 | Stop reason: HOSPADM

## 2018-05-29 RX ORDER — METHYLENE BLUE 10 MG/ML
INJECTION INTRAVENOUS PRN
Status: DISCONTINUED | OUTPATIENT
Start: 2018-05-29 | End: 2018-05-29 | Stop reason: HOSPADM

## 2018-05-29 RX ORDER — ONDANSETRON 2 MG/ML
INJECTION INTRAMUSCULAR; INTRAVENOUS PRN
Status: DISCONTINUED | OUTPATIENT
Start: 2018-05-29 | End: 2018-05-29 | Stop reason: SDUPTHER

## 2018-05-29 RX ORDER — OXYCODONE HYDROCHLORIDE AND ACETAMINOPHEN 5; 325 MG/1; MG/1
1 TABLET ORAL EVERY 6 HOURS PRN
Qty: 28 TABLET | Refills: 0 | Status: SHIPPED | OUTPATIENT
Start: 2018-05-29 | End: 2018-06-05

## 2018-05-29 RX ORDER — SODIUM CHLORIDE 0.9 % (FLUSH) 0.9 %
10 SYRINGE (ML) INJECTION PRN
Status: DISCONTINUED | OUTPATIENT
Start: 2018-05-29 | End: 2018-05-29 | Stop reason: HOSPADM

## 2018-05-29 RX ORDER — FENTANYL CITRATE 50 UG/ML
50 INJECTION, SOLUTION INTRAMUSCULAR; INTRAVENOUS EVERY 10 MIN PRN
Status: DISCONTINUED | OUTPATIENT
Start: 2018-05-29 | End: 2018-05-29 | Stop reason: HOSPADM

## 2018-05-29 RX ORDER — FENTANYL CITRATE 50 UG/ML
INJECTION, SOLUTION INTRAMUSCULAR; INTRAVENOUS PRN
Status: DISCONTINUED | OUTPATIENT
Start: 2018-05-29 | End: 2018-05-29 | Stop reason: SDUPTHER

## 2018-05-29 RX ORDER — ONDANSETRON 2 MG/ML
4 INJECTION INTRAMUSCULAR; INTRAVENOUS
Status: DISCONTINUED | OUTPATIENT
Start: 2018-05-29 | End: 2018-05-29 | Stop reason: HOSPADM

## 2018-05-29 RX ORDER — WOUND DRESSING ADHESIVE - LIQUID
LIQUID MISCELLANEOUS PRN
Status: DISCONTINUED | OUTPATIENT
Start: 2018-05-29 | End: 2018-05-29 | Stop reason: HOSPADM

## 2018-05-29 RX ORDER — ACETAMINOPHEN 650 MG/1
650 SUPPOSITORY RECTAL EVERY 4 HOURS PRN
Status: DISCONTINUED | OUTPATIENT
Start: 2018-05-29 | End: 2018-05-29 | Stop reason: HOSPADM

## 2018-05-29 RX ORDER — ACETAMINOPHEN 325 MG/1
650 TABLET ORAL EVERY 4 HOURS PRN
Status: DISCONTINUED | OUTPATIENT
Start: 2018-05-29 | End: 2018-05-29 | Stop reason: HOSPADM

## 2018-05-29 RX ORDER — OXYCODONE HYDROCHLORIDE AND ACETAMINOPHEN 5; 325 MG/1; MG/1
2 TABLET ORAL EVERY 4 HOURS PRN
Status: DISCONTINUED | OUTPATIENT
Start: 2018-05-29 | End: 2018-05-29 | Stop reason: HOSPADM

## 2018-05-29 RX ORDER — LIDOCAINE HYDROCHLORIDE 20 MG/ML
INJECTION, SOLUTION INFILTRATION; PERINEURAL PRN
Status: DISCONTINUED | OUTPATIENT
Start: 2018-05-29 | End: 2018-05-29 | Stop reason: SDUPTHER

## 2018-05-29 RX ORDER — MEPERIDINE HYDROCHLORIDE 25 MG/ML
12.5 INJECTION INTRAMUSCULAR; INTRAVENOUS; SUBCUTANEOUS EVERY 5 MIN PRN
Status: DISCONTINUED | OUTPATIENT
Start: 2018-05-29 | End: 2018-05-29 | Stop reason: HOSPADM

## 2018-05-29 RX ORDER — DEXAMETHASONE SODIUM PHOSPHATE 4 MG/ML
INJECTION, SOLUTION INTRA-ARTICULAR; INTRALESIONAL; INTRAMUSCULAR; INTRAVENOUS; SOFT TISSUE PRN
Status: DISCONTINUED | OUTPATIENT
Start: 2018-05-29 | End: 2018-05-29 | Stop reason: SDUPTHER

## 2018-05-29 RX ORDER — SODIUM CHLORIDE 9 MG/ML
INJECTION, SOLUTION INTRAVENOUS CONTINUOUS
Status: DISCONTINUED | OUTPATIENT
Start: 2018-05-29 | End: 2018-05-29 | Stop reason: HOSPADM

## 2018-05-29 RX ORDER — ONDANSETRON 2 MG/ML
4 INJECTION INTRAMUSCULAR; INTRAVENOUS EVERY 6 HOURS PRN
Status: DISCONTINUED | OUTPATIENT
Start: 2018-05-29 | End: 2018-05-29 | Stop reason: HOSPADM

## 2018-05-29 RX ORDER — METOCLOPRAMIDE HYDROCHLORIDE 5 MG/ML
10 INJECTION INTRAMUSCULAR; INTRAVENOUS
Status: DISCONTINUED | OUTPATIENT
Start: 2018-05-29 | End: 2018-05-29 | Stop reason: HOSPADM

## 2018-05-29 RX ORDER — MIDAZOLAM HYDROCHLORIDE 1 MG/ML
INJECTION INTRAMUSCULAR; INTRAVENOUS PRN
Status: DISCONTINUED | OUTPATIENT
Start: 2018-05-29 | End: 2018-05-29 | Stop reason: SDUPTHER

## 2018-05-29 RX ORDER — LIDOCAINE HYDROCHLORIDE 10 MG/ML
1 INJECTION, SOLUTION EPIDURAL; INFILTRATION; INTRACAUDAL; PERINEURAL
Status: COMPLETED | OUTPATIENT
Start: 2018-05-29 | End: 2018-05-29

## 2018-05-29 RX ORDER — DIPHENHYDRAMINE HYDROCHLORIDE 50 MG/ML
12.5 INJECTION INTRAMUSCULAR; INTRAVENOUS
Status: COMPLETED | OUTPATIENT
Start: 2018-05-29 | End: 2018-05-29

## 2018-05-29 RX ORDER — SODIUM CHLORIDE 9 MG/ML
INJECTION, SOLUTION INTRAVENOUS CONTINUOUS
Status: CANCELLED | OUTPATIENT
Start: 2018-05-29

## 2018-05-29 RX ADMIN — DIPHENHYDRAMINE HYDROCHLORIDE 12.5 MG: 50 INJECTION, SOLUTION INTRAMUSCULAR; INTRAVENOUS at 10:07

## 2018-05-29 RX ADMIN — DEXAMETHASONE SODIUM PHOSPHATE 4 MG: 4 INJECTION INTRA-ARTICULAR; INTRALESIONAL; INTRAMUSCULAR; INTRAVENOUS; SOFT TISSUE at 07:44

## 2018-05-29 RX ADMIN — CEFTRIAXONE SODIUM 1 G: 1 INJECTION, POWDER, FOR SOLUTION INTRAMUSCULAR; INTRAVENOUS at 07:35

## 2018-05-29 RX ADMIN — ONDANSETRON 4 MG: 2 INJECTION INTRAMUSCULAR; INTRAVENOUS at 08:10

## 2018-05-29 RX ADMIN — HYDROMORPHONE HYDROCHLORIDE 0.5 MG: 1 INJECTION, SOLUTION INTRAMUSCULAR; INTRAVENOUS; SUBCUTANEOUS at 09:31

## 2018-05-29 RX ADMIN — LIDOCAINE HYDROCHLORIDE 0.5 ML: 10 INJECTION, SOLUTION EPIDURAL; INFILTRATION; INTRACAUDAL; PERINEURAL at 06:47

## 2018-05-29 RX ADMIN — LIDOCAINE HYDROCHLORIDE 3 ML: 20 INJECTION, SOLUTION INFILTRATION; PERINEURAL at 07:39

## 2018-05-29 RX ADMIN — FENTANYL CITRATE 25 MCG: 50 INJECTION, SOLUTION INTRAMUSCULAR; INTRAVENOUS at 08:01

## 2018-05-29 RX ADMIN — OXYCODONE HYDROCHLORIDE AND ACETAMINOPHEN 2 TABLET: 5; 325 TABLET ORAL at 09:45

## 2018-05-29 RX ADMIN — HYDROMORPHONE HYDROCHLORIDE 0.5 MG: 1 INJECTION, SOLUTION INTRAMUSCULAR; INTRAVENOUS; SUBCUTANEOUS at 09:19

## 2018-05-29 RX ADMIN — FENTANYL CITRATE 50 MCG: 50 INJECTION INTRAMUSCULAR; INTRAVENOUS at 10:00

## 2018-05-29 RX ADMIN — FENTANYL CITRATE 50 MCG: 50 INJECTION, SOLUTION INTRAMUSCULAR; INTRAVENOUS at 07:42

## 2018-05-29 RX ADMIN — FENTANYL CITRATE 50 MCG: 50 INJECTION INTRAMUSCULAR; INTRAVENOUS at 09:50

## 2018-05-29 RX ADMIN — FENTANYL CITRATE 50 MCG: 50 INJECTION, SOLUTION INTRAMUSCULAR; INTRAVENOUS at 09:12

## 2018-05-29 RX ADMIN — MIDAZOLAM HYDROCHLORIDE 2 MG: 1 INJECTION, SOLUTION INTRAMUSCULAR; INTRAVENOUS at 07:34

## 2018-05-29 RX ADMIN — FENTANYL CITRATE 50 MCG: 50 INJECTION, SOLUTION INTRAMUSCULAR; INTRAVENOUS at 07:45

## 2018-05-29 RX ADMIN — FENTANYL CITRATE 25 MCG: 50 INJECTION, SOLUTION INTRAMUSCULAR; INTRAVENOUS at 07:48

## 2018-05-29 RX ADMIN — SODIUM CHLORIDE: 9 INJECTION, SOLUTION INTRAVENOUS at 06:48

## 2018-05-29 RX ADMIN — PROPOFOL 160 MG: 10 INJECTION, EMULSION INTRAVENOUS at 07:39

## 2018-05-29 RX ADMIN — FENTANYL CITRATE 50 MCG: 50 INJECTION, SOLUTION INTRAMUSCULAR; INTRAVENOUS at 09:09

## 2018-05-29 RX ADMIN — FENTANYL CITRATE 25 MCG: 50 INJECTION, SOLUTION INTRAMUSCULAR; INTRAVENOUS at 07:55

## 2018-05-29 RX ADMIN — FENTANYL CITRATE 25 MCG: 50 INJECTION, SOLUTION INTRAMUSCULAR; INTRAVENOUS at 08:03

## 2018-05-29 ASSESSMENT — PULMONARY FUNCTION TESTS
PIF_VALUE: 3
PIF_VALUE: 4
PIF_VALUE: 1
PIF_VALUE: 2
PIF_VALUE: 3
PIF_VALUE: 3
PIF_VALUE: 2
PIF_VALUE: 3
PIF_VALUE: 2
PIF_VALUE: 3
PIF_VALUE: 2
PIF_VALUE: 3
PIF_VALUE: 3
PIF_VALUE: 2
PIF_VALUE: 1
PIF_VALUE: 4
PIF_VALUE: 1
PIF_VALUE: 3
PIF_VALUE: 2
PIF_VALUE: 2
PIF_VALUE: 3
PIF_VALUE: 4
PIF_VALUE: 2
PIF_VALUE: 3
PIF_VALUE: 2
PIF_VALUE: 13
PIF_VALUE: 2
PIF_VALUE: 3
PIF_VALUE: 2
PIF_VALUE: 2
PIF_VALUE: 4
PIF_VALUE: 4
PIF_VALUE: 3
PIF_VALUE: 4
PIF_VALUE: 3
PIF_VALUE: 3
PIF_VALUE: 5
PIF_VALUE: 5
PIF_VALUE: 3
PIF_VALUE: 10
PIF_VALUE: 5
PIF_VALUE: 4
PIF_VALUE: 4
PIF_VALUE: 3
PIF_VALUE: 1
PIF_VALUE: 2
PIF_VALUE: 5
PIF_VALUE: 1
PIF_VALUE: 3
PIF_VALUE: 2
PIF_VALUE: 4
PIF_VALUE: 3
PIF_VALUE: 4
PIF_VALUE: 3
PIF_VALUE: 2
PIF_VALUE: 2
PIF_VALUE: 3
PIF_VALUE: 2
PIF_VALUE: 2
PIF_VALUE: 3
PIF_VALUE: 2
PIF_VALUE: 2
PIF_VALUE: 5
PIF_VALUE: 4
PIF_VALUE: 3
PIF_VALUE: 5
PIF_VALUE: 2
PIF_VALUE: 4
PIF_VALUE: 3
PIF_VALUE: 2
PIF_VALUE: 3
PIF_VALUE: 4
PIF_VALUE: 4
PIF_VALUE: 2
PIF_VALUE: 1
PIF_VALUE: 3
PIF_VALUE: 4
PIF_VALUE: 3
PIF_VALUE: 3
PIF_VALUE: 2
PIF_VALUE: 3
PIF_VALUE: 2

## 2018-05-29 ASSESSMENT — PAIN - FUNCTIONAL ASSESSMENT: PAIN_FUNCTIONAL_ASSESSMENT: 0-10

## 2018-05-29 ASSESSMENT — PAIN DESCRIPTION - PAIN TYPE
TYPE: SURGICAL PAIN
TYPE: SURGICAL PAIN

## 2018-05-29 ASSESSMENT — PAIN DESCRIPTION - ORIENTATION
ORIENTATION: LEFT
ORIENTATION: LEFT

## 2018-05-29 ASSESSMENT — PAIN SCALES - GENERAL
PAINLEVEL_OUTOF10: 10
PAINLEVEL_OUTOF10: 10
PAINLEVEL_OUTOF10: 7
PAINLEVEL_OUTOF10: 10
PAINLEVEL_OUTOF10: 4
PAINLEVEL_OUTOF10: 7
PAINLEVEL_OUTOF10: 10

## 2018-05-29 ASSESSMENT — PAIN DESCRIPTION - LOCATION
LOCATION: BREAST
LOCATION: BREAST

## 2018-05-29 ASSESSMENT — PAIN DESCRIPTION - DESCRIPTORS: DESCRIPTORS: SHARP

## 2018-05-29 ASSESSMENT — LIFESTYLE VARIABLES: SMOKING_STATUS: 0

## 2018-05-29 ASSESSMENT — PAIN DESCRIPTION - FREQUENCY: FREQUENCY: CONTINUOUS

## 2018-05-31 ENCOUNTER — TELEPHONE (OUTPATIENT)
Dept: SURGERY | Age: 53
End: 2018-05-31

## 2018-06-08 ENCOUNTER — OFFICE VISIT (OUTPATIENT)
Dept: SURGERY | Age: 53
End: 2018-06-08

## 2018-06-08 VITALS
HEIGHT: 64 IN | DIASTOLIC BLOOD PRESSURE: 74 MMHG | WEIGHT: 176 LBS | TEMPERATURE: 98.8 F | BODY MASS INDEX: 30.05 KG/M2 | SYSTOLIC BLOOD PRESSURE: 120 MMHG

## 2018-06-08 DIAGNOSIS — C50.512 MALIGNANT NEOPLASM OF LOWER-OUTER QUADRANT OF LEFT BREAST OF FEMALE, ESTROGEN RECEPTOR POSITIVE (HCC): Primary | ICD-10-CM

## 2018-06-08 DIAGNOSIS — Z17.0 MALIGNANT NEOPLASM OF LOWER-OUTER QUADRANT OF LEFT BREAST OF FEMALE, ESTROGEN RECEPTOR POSITIVE (HCC): Primary | ICD-10-CM

## 2018-06-08 DIAGNOSIS — Z09 SURGICAL FOLLOWUP: ICD-10-CM

## 2018-06-08 PROCEDURE — 99024 POSTOP FOLLOW-UP VISIT: CPT | Performed by: SURGERY

## 2018-06-28 ENCOUNTER — HOSPITAL ENCOUNTER (OUTPATIENT)
Dept: RADIATION ONCOLOGY | Age: 53
Discharge: HOME | End: 2018-06-28
Payer: MEDICARE

## 2018-06-28 VITALS
HEART RATE: 71 BPM | WEIGHT: 175.4 LBS | TEMPERATURE: 96.3 F | RESPIRATION RATE: 14 BRPM | DIASTOLIC BLOOD PRESSURE: 68 MMHG | SYSTOLIC BLOOD PRESSURE: 119 MMHG | BODY MASS INDEX: 30.11 KG/M2 | OXYGEN SATURATION: 99 %

## 2018-06-28 DIAGNOSIS — J44.9 CHRONIC OBSTRUCTIVE PULMONARY DISEASE, UNSPECIFIED COPD TYPE (HCC): ICD-10-CM

## 2018-06-28 DIAGNOSIS — F32.A DEPRESSION, UNSPECIFIED DEPRESSION TYPE: ICD-10-CM

## 2018-06-28 DIAGNOSIS — C50.912 INVASIVE DUCTAL CARCINOMA OF LEFT BREAST (HCC): ICD-10-CM

## 2018-06-28 PROCEDURE — 99212 OFFICE O/P EST SF 10 MIN: CPT | Performed by: RADIOLOGY

## 2018-07-25 ENCOUNTER — HOSPITAL ENCOUNTER (OUTPATIENT)
Dept: RADIATION ONCOLOGY | Age: 53
Discharge: HOME | End: 2018-07-25
Payer: MEDICARE

## 2018-07-25 VITALS
TEMPERATURE: 97.5 F | OXYGEN SATURATION: 95 % | BODY MASS INDEX: 29.42 KG/M2 | HEART RATE: 68 BPM | WEIGHT: 171.4 LBS | SYSTOLIC BLOOD PRESSURE: 120 MMHG | DIASTOLIC BLOOD PRESSURE: 65 MMHG | RESPIRATION RATE: 16 BRPM

## 2018-07-25 PROCEDURE — 77290 THER RAD SIMULAJ FIELD CPLX: CPT | Performed by: RADIOLOGY

## 2018-07-25 PROCEDURE — 99214 OFFICE O/P EST MOD 30 MIN: CPT | Performed by: RADIOLOGY

## 2018-07-25 PROCEDURE — 77334 RADIATION TREATMENT AID(S): CPT | Performed by: RADIOLOGY

## 2018-07-25 RX ORDER — CYCLOBENZAPRINE HCL 10 MG
10 TABLET ORAL DAILY
COMMUNITY
End: 2018-10-03

## 2018-07-25 NOTE — H&P
RADIATION FOLLOW UP NOTE  DATE OF VISIT: 7/25/2018    DIAGNOSIS & STAGING: Stage 1A L breast cancer: Grade 2 unifocal no LVI 0/3 SLN with negative margins ER 90% FL 90% her2-        Dear Dr Kym Rockwell and Dr Gabbie Blancas: Here today for adjuvant radiation planning     INTERVAL HISTORY: I initially saw Duane Graces  On 6/28/18 after I was asked by Dr. Maggie Boyce to see this 47 y.o. female who who had a mammographically detected non palpable 1.5 cm breast cancer detected on 4/23/18 through routine screening. She underwent US guided L breast core biopsy on 5/11/18 demonstrating an Grade 2  ER 90% FL 90% her 2- IDC. She  Underwent lumpectomy and SLN evaluation with Dr Maggie Boyce on 5/29/18 that demonstrated a 1.5 cm tumor, unifocal with negative margins. 0/3 SLN involved. She saw Dr Kym Rockwell who sent an Oncotype test out, due to return in 3 weeks and e is here today for adjuvant radiation recommendations. Oncotype test returned as low recurrence risk scor 8 5% 10y RR with padilla alone. She is scheduled to see Dr Kym Rockwell following my appointment today. She complains only of surgical deformity L breast and numb area under L upper arm.           PAST MEDICAL HISTORY:   Past Medical History:   Diagnosis Date    Asthma     Chronic back pain greater than 3 months duration 2012    Dr Russell Breeding COPD (chronic obstructive pulmonary disease) [facility]) 2017    Dr Abundio Glaser    Depression 2010    Dr Selina [facility]), hospital admissions    Family history of malignant neoplasm of breast     H/O vitamin D deficiency     Invasive ductal carcinoma of left breast [address] [address] 05/2018    T1N0M0 ERPR+ her2-       PAST SURGICAL HISTORY:  Past Surgical History:   Procedure Laterality Date    BREAST CYST EXCISION Right 2000    benign    BREAST LUMPECTOMY      and SLN evaluation    DILATION AND CURETTAGE OF UTERUS  2012    due to [address]      right index finger / exc tumor mass / at age 9   Radha Rios [place] [address] Left 5/11/2018    US guided left breast biopsy, excisional    VT REMOVAL OF BREAST LESION Left 5/29/2018    RE EXCISION BREAST CANCER SITE, SENTINEL NODE BIOPSY    TONSILLECTOMY      at age 12       ALLERGIES:   Allergies   Allergen Reactions    Morphine Itching    Abilify [Aripiprazole]      migraine    Dye [Gadolinium Derivatives]     Effexor [Venlafaxine]      diarrhea    Seasonal Other (See Comments)     sinus congestion        I have personally reviewed and reconciled the allergies listed. CURRENT MEDICATIONS:   Prior to Admission medications    Medication Sig Start Date End Date Taking? Authorizing Provider   cyclobenzaprine (FLEXERIL) 10 MG tablet Take 10 mg by mouth daily   Yes Historical Provider, MD   FLUoxetine (PROZAC) 20 MG tablet Take 1 tablet by mouth daily 4/26/18  Yes Leon Price MD   Multiple Vitamins-Minerals (MULTIVITAMIN PO) Take by mouth   Yes Historical Provider, MD   budesonide-formoterol (SYMBICORT) 80-4.5 MCG/ACT AERO Inhale 2 puffs into the lungs 2 times daily 12/20/17  Yes Annetta Cole MD   albuterol sulfate HFA (PROAIR HFA) 108 (90 Base) MCG/ACT inhaler Inhale 2 puffs into the lungs every 6 hours as needed for Wheezing 10/16/17  Yes Leon Price MD   Vitamin D (CHOLECALCIFEROL) 1000 UNITS CAPS capsule Take 1,000 Units by mouth daily   Yes Historical Provider, MD   traMADol (ULTRAM) 50 MG tablet Take 50 mg by mouth every 6 hours as needed. 3/30/15  Yes Historical Provider, MD       I have personally reviewed and reconciled the medications listed.     FAMILY HISTORY:   Family History   Problem Relation Age of Onset    Heart Failure Mother         dec 80    Osteoarthritis Mother     Cancer Mother 61        breast     Breast Cancer Mother     Heart Attack Father         dec 58    Hypertension Father     High Cholesterol Father     Stroke Sister     No Known Problems Son        SOCIAL HISTORY:   History   Smoking Status    Former Smoker    Packs/day: 1.00    Years: all extremities bilaterally. Tone is normal.     NEUROLOGIC:  Awake, alert, oriented x 3. Nonfocal     SKIN:  no bruising or bleeding, normal skin color, texture, turgor, no redness, warmth, or swelling, no rashes, no lesions, no abnormal moles and no jaundice    STUDIES: I have personally reviewed the imaging, laboratory, and pathology studies as previously described. IMPRESSION/PLAN:    Stage 1A L breast cancer: Grade 2 unifocal no LVI 0/3 SLN with negative margins ER 90% CA 90% her2-. Simulation today using ABC evaluation for cardiac sparing. 42.56Gy/16fx planned to L breast.  Endocrine therapy to follow          Thank you for allowing us to participate in the care of this patient.   Sincerely,    Electronically signed by Elsie Reynoso MD on 7/25/18 at 11:24 AM      cc:   No att. providers found  MD Rose Jasso MD  [address] [id number]  [place], [address] [facility]

## 2018-07-30 PROCEDURE — 77295 3-D RADIOTHERAPY PLAN: CPT | Performed by: RADIOLOGY

## 2018-07-30 PROCEDURE — 77293 RESPIRATOR MOTION MGMT SIMUL: CPT | Performed by: RADIOLOGY

## 2018-07-30 PROCEDURE — 77334 RADIATION TREATMENT AID(S): CPT | Performed by: RADIOLOGY

## 2018-07-30 PROCEDURE — 77300 RADIATION THERAPY DOSE PLAN: CPT | Performed by: RADIOLOGY

## 2018-07-31 PROCEDURE — 77370 RADIATION PHYSICS CONSULT: CPT | Performed by: RADIOLOGY

## 2018-08-01 ENCOUNTER — HOSPITAL ENCOUNTER (OUTPATIENT)
Dept: RADIATION ONCOLOGY | Age: 53
Discharge: HOME | End: 2018-08-01
Payer: MEDICARE

## 2018-08-01 PROCEDURE — 77280 THER RAD SIMULAJ FIELD SMPL: CPT | Performed by: RADIOLOGY

## 2018-08-01 PROCEDURE — 77387 GUIDANCE FOR RADJ TX DLVR: CPT | Performed by: RADIOLOGY

## 2018-08-02 PROCEDURE — 77412 RADIATION TX DELIVERY LVL 3: CPT | Performed by: RADIOLOGY

## 2018-08-02 PROCEDURE — 77387 GUIDANCE FOR RADJ TX DLVR: CPT | Performed by: RADIOLOGY

## 2018-08-03 PROCEDURE — 77387 GUIDANCE FOR RADJ TX DLVR: CPT | Performed by: RADIOLOGY

## 2018-08-03 PROCEDURE — 77412 RADIATION TX DELIVERY LVL 3: CPT | Performed by: RADIOLOGY

## 2018-08-06 PROCEDURE — 77412 RADIATION TX DELIVERY LVL 3: CPT | Performed by: RADIOLOGY

## 2018-08-06 PROCEDURE — 77387 GUIDANCE FOR RADJ TX DLVR: CPT | Performed by: RADIOLOGY

## 2018-08-07 PROCEDURE — 99212 OFFICE O/P EST SF 10 MIN: CPT | Performed by: RADIOLOGY

## 2018-08-07 PROCEDURE — 77412 RADIATION TX DELIVERY LVL 3: CPT | Performed by: RADIOLOGY

## 2018-08-07 PROCEDURE — 77387 GUIDANCE FOR RADJ TX DLVR: CPT | Performed by: RADIOLOGY

## 2018-08-08 PROCEDURE — 77417 THER RADIOLOGY PORT IMAGE(S): CPT | Performed by: RADIOLOGY

## 2018-08-08 PROCEDURE — 77412 RADIATION TX DELIVERY LVL 3: CPT | Performed by: RADIOLOGY

## 2018-08-08 PROCEDURE — 77387 GUIDANCE FOR RADJ TX DLVR: CPT | Performed by: RADIOLOGY

## 2018-08-08 PROCEDURE — 77336 RADIATION PHYSICS CONSULT: CPT | Performed by: RADIOLOGY

## 2018-08-09 PROCEDURE — 77412 RADIATION TX DELIVERY LVL 3: CPT | Performed by: RADIOLOGY

## 2018-08-09 PROCEDURE — 77387 GUIDANCE FOR RADJ TX DLVR: CPT | Performed by: RADIOLOGY

## 2018-08-10 PROCEDURE — 77412 RADIATION TX DELIVERY LVL 3: CPT | Performed by: RADIOLOGY

## 2018-08-10 PROCEDURE — 77387 GUIDANCE FOR RADJ TX DLVR: CPT | Performed by: RADIOLOGY

## 2018-08-13 PROCEDURE — 77387 GUIDANCE FOR RADJ TX DLVR: CPT | Performed by: RADIOLOGY

## 2018-08-13 PROCEDURE — 77412 RADIATION TX DELIVERY LVL 3: CPT | Performed by: RADIOLOGY

## 2018-08-14 PROCEDURE — 99212 OFFICE O/P EST SF 10 MIN: CPT | Performed by: RADIOLOGY

## 2018-08-14 PROCEDURE — 77412 RADIATION TX DELIVERY LVL 3: CPT | Performed by: RADIOLOGY

## 2018-08-14 PROCEDURE — 77387 GUIDANCE FOR RADJ TX DLVR: CPT | Performed by: RADIOLOGY

## 2018-08-15 PROCEDURE — 77417 THER RADIOLOGY PORT IMAGE(S): CPT | Performed by: RADIOLOGY

## 2018-08-15 PROCEDURE — 77387 GUIDANCE FOR RADJ TX DLVR: CPT | Performed by: RADIOLOGY

## 2018-08-15 PROCEDURE — 77336 RADIATION PHYSICS CONSULT: CPT | Performed by: RADIOLOGY

## 2018-08-15 PROCEDURE — 77412 RADIATION TX DELIVERY LVL 3: CPT | Performed by: RADIOLOGY

## 2018-08-16 PROCEDURE — 77387 GUIDANCE FOR RADJ TX DLVR: CPT | Performed by: RADIOLOGY

## 2018-08-16 PROCEDURE — 77412 RADIATION TX DELIVERY LVL 3: CPT | Performed by: RADIOLOGY

## 2018-08-17 PROCEDURE — 77412 RADIATION TX DELIVERY LVL 3: CPT | Performed by: RADIOLOGY

## 2018-08-17 PROCEDURE — 77387 GUIDANCE FOR RADJ TX DLVR: CPT | Performed by: RADIOLOGY

## 2018-08-20 PROCEDURE — 77412 RADIATION TX DELIVERY LVL 3: CPT | Performed by: RADIOLOGY

## 2018-08-20 PROCEDURE — 99212 OFFICE O/P EST SF 10 MIN: CPT | Performed by: RADIOLOGY

## 2018-08-20 PROCEDURE — 77387 GUIDANCE FOR RADJ TX DLVR: CPT | Performed by: RADIOLOGY

## 2018-08-21 PROCEDURE — 77412 RADIATION TX DELIVERY LVL 3: CPT | Performed by: RADIOLOGY

## 2018-08-21 PROCEDURE — 77387 GUIDANCE FOR RADJ TX DLVR: CPT | Performed by: RADIOLOGY

## 2018-08-22 PROCEDURE — 77336 RADIATION PHYSICS CONSULT: CPT | Performed by: RADIOLOGY

## 2018-08-22 PROCEDURE — 77412 RADIATION TX DELIVERY LVL 3: CPT | Performed by: RADIOLOGY

## 2018-08-22 PROCEDURE — 77387 GUIDANCE FOR RADJ TX DLVR: CPT | Performed by: RADIOLOGY

## 2018-08-23 PROCEDURE — 77387 GUIDANCE FOR RADJ TX DLVR: CPT | Performed by: RADIOLOGY

## 2018-08-23 PROCEDURE — 77412 RADIATION TX DELIVERY LVL 3: CPT | Performed by: RADIOLOGY

## 2018-10-03 ENCOUNTER — OFFICE VISIT (OUTPATIENT)
Dept: INTERNAL MEDICINE CLINIC | Age: 53
End: 2018-10-03
Payer: MEDICARE

## 2018-10-03 VITALS
HEART RATE: 93 BPM | HEIGHT: 64 IN | BODY MASS INDEX: 28.85 KG/M2 | OXYGEN SATURATION: 97 % | SYSTOLIC BLOOD PRESSURE: 118 MMHG | TEMPERATURE: 97.6 F | WEIGHT: 169 LBS | DIASTOLIC BLOOD PRESSURE: 79 MMHG

## 2018-10-03 DIAGNOSIS — Z12.11 COLON CANCER SCREENING: ICD-10-CM

## 2018-10-03 DIAGNOSIS — J44.1 CHRONIC OBSTRUCTIVE PULMONARY DISEASE WITH ACUTE EXACERBATION (HCC): Primary | ICD-10-CM

## 2018-10-03 DIAGNOSIS — F33.41 MAJOR DEPRESSIVE DISORDER, RECURRENT EPISODE, IN PARTIAL REMISSION (HCC): ICD-10-CM

## 2018-10-03 PROCEDURE — 99213 OFFICE O/P EST LOW 20 MIN: CPT | Performed by: INTERNAL MEDICINE

## 2018-10-03 PROCEDURE — G8427 DOCREV CUR MEDS BY ELIG CLIN: HCPCS | Performed by: INTERNAL MEDICINE

## 2018-10-03 PROCEDURE — G8484 FLU IMMUNIZE NO ADMIN: HCPCS | Performed by: INTERNAL MEDICINE

## 2018-10-03 PROCEDURE — G8417 CALC BMI ABV UP PARAM F/U: HCPCS | Performed by: INTERNAL MEDICINE

## 2018-10-03 PROCEDURE — 3017F COLORECTAL CA SCREEN DOC REV: CPT | Performed by: INTERNAL MEDICINE

## 2018-10-03 PROCEDURE — 3023F SPIROM DOC REV: CPT | Performed by: INTERNAL MEDICINE

## 2018-10-03 PROCEDURE — G8926 SPIRO NO PERF OR DOC: HCPCS | Performed by: INTERNAL MEDICINE

## 2018-10-03 PROCEDURE — 1036F TOBACCO NON-USER: CPT | Performed by: INTERNAL MEDICINE

## 2018-10-03 RX ORDER — TIZANIDINE 4 MG/1
TABLET ORAL
Refills: 0 | COMMUNITY
Start: 2018-07-13

## 2018-10-03 RX ORDER — TAMOXIFEN CITRATE 20 MG/1
TABLET ORAL
Refills: 0 | COMMUNITY
Start: 2018-10-01 | End: 2018-12-19 | Stop reason: ALTCHOICE

## 2018-10-03 RX ORDER — PREDNISONE 10 MG/1
TABLET ORAL
Qty: 20 TABLET | Refills: 0 | Status: SHIPPED | OUTPATIENT
Start: 2018-10-03 | End: 2018-10-26 | Stop reason: SDUPTHER

## 2018-10-03 RX ORDER — AZITHROMYCIN 250 MG/1
TABLET, FILM COATED ORAL
Qty: 1 PACKET | Refills: 0 | Status: SHIPPED | OUTPATIENT
Start: 2018-10-03 | End: 2018-10-07

## 2018-10-03 RX ORDER — ALBUTEROL SULFATE 2.5 MG/3ML
2.5 SOLUTION RESPIRATORY (INHALATION) EVERY 6 HOURS PRN
Qty: 120 EACH | Refills: 3 | Status: SHIPPED | OUTPATIENT
Start: 2018-10-03

## 2018-10-03 RX ORDER — MELOXICAM 15 MG/1
TABLET ORAL
Refills: 0 | COMMUNITY
Start: 2018-09-14 | End: 2019-03-22

## 2018-10-03 RX ORDER — FLUOXETINE HYDROCHLORIDE 20 MG/1
CAPSULE ORAL
Refills: 0 | COMMUNITY
Start: 2018-09-14 | End: 2018-10-03

## 2018-10-03 RX ORDER — ALBUTEROL SULFATE 90 UG/1
2 AEROSOL, METERED RESPIRATORY (INHALATION) EVERY 6 HOURS PRN
Qty: 1 INHALER | Refills: 3 | Status: CANCELLED | OUTPATIENT
Start: 2018-10-03

## 2018-10-03 RX ORDER — ALBUTEROL SULFATE 90 UG/1
2 AEROSOL, METERED RESPIRATORY (INHALATION) EVERY 6 HOURS PRN
Qty: 1 INHALER | Refills: 3 | Status: SHIPPED | OUTPATIENT
Start: 2018-10-03

## 2018-10-03 NOTE — PROGRESS NOTES
Refill:  0    azithromycin (ZITHROMAX Z-MARCELO) 250 MG tablet     Sig: Take 2 tablets (500 mg) on Day 1, and then take 1 tablet (250 mg) on days 2 through 5. Dispense:  1 packet     Refill:  0       Orders Placed This Encounter   Procedures   [facility]     Referral Priority:   Routine     Referral Type:   Eval and Treat     Referral Reason:   Specialty Services Required     Referred to Provider:   Marta Mendoza MD     Requested Specialty:   Gastroenterology     Number of Visits Requested:   1       Close follow up needed to evaluate treatment results and for care coordination. Return in about 1 month (around 11/3/2018) for pap test.    I have reviewed the patient's medical and surgical, family and social history, health maintenance schedule, and updated the computerized patient record. Please note this report has been partially produced by using speech recognition hardware. It may contain errors related to the system, including grammar, punctuation and spelling as well as words and phrases that may seem inaccurate. For any questions or concerns, please feel free to contact me for clarification.         Electronically signed by Genoveva Sam MD

## 2018-10-14 ASSESSMENT — ENCOUNTER SYMPTOMS
SHORTNESS OF BREATH: 1
TROUBLE SWALLOWING: 0
CHEST TIGHTNESS: 0
CHOKING: 0
VOICE CHANGE: 0
WHEEZING: 1
SINUS PRESSURE: 0
SINUS PAIN: 0
COUGH: 1

## 2018-10-26 DIAGNOSIS — J44.9 CHRONIC OBSTRUCTIVE PULMONARY DISEASE, UNSPECIFIED COPD TYPE (HCC): Primary | ICD-10-CM

## 2018-10-26 RX ORDER — PREDNISONE 10 MG/1
TABLET ORAL
Qty: 20 TABLET | Refills: 0 | Status: SHIPPED | OUTPATIENT
Start: 2018-10-26 | End: 2018-11-20

## 2018-11-06 DIAGNOSIS — C50.912 INVASIVE DUCTAL CARCINOMA OF LEFT BREAST (HCC): ICD-10-CM

## 2018-11-06 DIAGNOSIS — C50.512 MALIGNANT NEOPLASM OF LOWER-OUTER QUADRANT OF LEFT FEMALE BREAST, UNSPECIFIED ESTROGEN RECEPTOR STATUS (HCC): ICD-10-CM

## 2018-11-06 LAB
ALBUMIN SERPL-MCNC: 4.3 G/DL (ref 3.9–4.9)
ALP BLD-CCNC: 55 U/L (ref 40–130)
ALT SERPL-CCNC: 14 U/L (ref 0–33)
ANION GAP SERPL CALCULATED.3IONS-SCNC: 14 MEQ/L (ref 7–13)
AST SERPL-CCNC: 17 U/L (ref 0–35)
BILIRUB SERPL-MCNC: <0.2 MG/DL (ref 0–1.2)
BUN BLDV-MCNC: 11 MG/DL (ref 6–20)
CALCIUM SERPL-MCNC: 9 MG/DL (ref 8.6–10.2)
CHLORIDE BLD-SCNC: 103 MEQ/L (ref 98–107)
CO2: 23 MEQ/L (ref 22–29)
CREAT SERPL-MCNC: 0.61 MG/DL (ref 0.5–0.9)
GFR AFRICAN AMERICAN: >60
GFR NON-AFRICAN AMERICAN: >60
GLOBULIN: 2.5 G/DL (ref 2.3–3.5)
GLUCOSE BLD-MCNC: 102 MG/DL (ref 74–109)
POTASSIUM SERPL-SCNC: 4 MEQ/L (ref 3.5–5.1)
SODIUM BLD-SCNC: 140 MEQ/L (ref 132–144)
TOTAL PROTEIN: 6.8 G/DL (ref 6.4–8.1)

## 2018-11-08 LAB — CA 27-29: 18 U/ML (ref 0–38)

## 2018-11-20 ENCOUNTER — HOSPITAL ENCOUNTER (OUTPATIENT)
Dept: GENERAL RADIOLOGY | Age: 53
Discharge: HOME | End: 2018-11-22
Payer: MEDICARE

## 2018-11-20 ENCOUNTER — OFFICE VISIT (OUTPATIENT)
Dept: PULMONOLOGY | Age: 53
End: 2018-11-20
Payer: MEDICARE

## 2018-11-20 VITALS
HEART RATE: 79 BPM | SYSTOLIC BLOOD PRESSURE: 118 MMHG | HEIGHT: 64 IN | OXYGEN SATURATION: 98 % | BODY MASS INDEX: 29.91 KG/M2 | TEMPERATURE: 98.2 F | WEIGHT: 175.2 LBS | DIASTOLIC BLOOD PRESSURE: 80 MMHG

## 2018-11-20 DIAGNOSIS — J44.1 COPD EXACERBATION (HCC): ICD-10-CM

## 2018-11-20 DIAGNOSIS — J01.00 ACUTE MAXILLARY SINUSITIS, RECURRENCE NOT SPECIFIED: ICD-10-CM

## 2018-11-20 DIAGNOSIS — J44.9 CHRONIC OBSTRUCTIVE PULMONARY DISEASE, UNSPECIFIED COPD TYPE (HCC): Primary | ICD-10-CM

## 2018-11-20 DIAGNOSIS — Z87.891 HISTORY OF PRIOR CIGARETTE SMOKING: ICD-10-CM

## 2018-11-20 PROCEDURE — 99214 OFFICE O/P EST MOD 30 MIN: CPT | Performed by: PHYSICIAN ASSISTANT

## 2018-11-20 PROCEDURE — G8417 CALC BMI ABV UP PARAM F/U: HCPCS | Performed by: PHYSICIAN ASSISTANT

## 2018-11-20 PROCEDURE — 3023F SPIROM DOC REV: CPT | Performed by: PHYSICIAN ASSISTANT

## 2018-11-20 PROCEDURE — G8484 FLU IMMUNIZE NO ADMIN: HCPCS | Performed by: PHYSICIAN ASSISTANT

## 2018-11-20 PROCEDURE — 3017F COLORECTAL CA SCREEN DOC REV: CPT | Performed by: PHYSICIAN ASSISTANT

## 2018-11-20 PROCEDURE — G8427 DOCREV CUR MEDS BY ELIG CLIN: HCPCS | Performed by: PHYSICIAN ASSISTANT

## 2018-11-20 PROCEDURE — 1036F TOBACCO NON-USER: CPT | Performed by: PHYSICIAN ASSISTANT

## 2018-11-20 PROCEDURE — G8926 SPIRO NO PERF OR DOC: HCPCS | Performed by: PHYSICIAN ASSISTANT

## 2018-11-20 PROCEDURE — 71046 X-RAY EXAM CHEST 2 VIEWS: CPT

## 2018-11-20 RX ORDER — PREDNISONE 10 MG/1
TABLET ORAL
Qty: 40 TABLET | Refills: 0 | Status: SHIPPED | OUTPATIENT
Start: 2018-11-20 | End: 2018-12-19

## 2018-11-20 RX ORDER — AZITHROMYCIN 250 MG/1
250 TABLET, FILM COATED ORAL SEE ADMIN INSTRUCTIONS
Qty: 6 TABLET | Refills: 0 | Status: SHIPPED | OUTPATIENT
Start: 2018-11-20 | End: 2018-11-25

## 2018-11-20 ASSESSMENT — ENCOUNTER SYMPTOMS
BACK PAIN: 0
STRIDOR: 0
TROUBLE SWALLOWING: 0
WHEEZING: 1
SORE THROAT: 1
RHINORRHEA: 1
ABDOMINAL PAIN: 0
SHORTNESS OF BREATH: 0
SINUS PRESSURE: 1
COUGH: 1
VOICE CHANGE: 0
SINUS PAIN: 1
CHEST TIGHTNESS: 0

## 2018-11-20 NOTE — PROGRESS NOTES
Subjective     Ofe Begum 47 y.o. female presents 11/20/18 with   Chief Complaint   Patient presents with    Follow-up     COPD     HPI   This is a 50-year-old female patient following up today regarding COPD. Since last evaluation patient reports that she has been doing well but spent time with her grandchildren this weekend one of which who had the flu and was on Tamiflu. Patient reports that she's had an increase in congestion as well as sinus symptoms since the weekend. Patient denies any chest pain and leg swelling. Patient reports associated wheezing cough and congestion. She is a former smoker and is recently stopped. She also has a recent diagnosis of breast cancer and follows with oncology team here and had a course of radiation and chemotherapy when she was diagnosed in June. Most recent chest x-ray from a year ago was negative for any acute process. Patient is using symbicort  C/o shortness of breath , worse with exertion. Occasional Wheezing   + Cough with  Sputum  No Hemoptysis  No Chest tightness   No Chest pain with radiation  or pleuritic pain  No Fever or chills. + Rhinorrhea and postnasal drip.     Using bronchodilator with albuterol PRN    Reviewed the following history:    Past Medical History:   Diagnosis Date    Asthma     Chronic back pain greater than 3 months duration 2012    Dr Mitul Connolly COPD (chronic obstructive pulmonary disease) [facility]) 2017    Dr Tricia De La Garza    Depression 2010    Dr Melyssa Bishop [facility]), hospital admissions    Family history of malignant neoplasm of breast     H/O vitamin D deficiency     Invasive ductal carcinoma of left breast ([facility] [address] 05/2018    T1N0M0 ERPR+ her2-, radiation, no chemo, Dr Stephanie Simeon     Past Surgical History:   Procedure Laterality Date    BREAST CYST EXCISION Right 2000    benign    BREAST LUMPECTOMY      and SLN evaluation    DILATION AND CURETTAGE OF UTERUS  2012    due to [address]      right index finger / exc tumor mass / re-evaluation.     Sumeet Vences PA-C

## 2018-12-06 ENCOUNTER — TELEPHONE (OUTPATIENT)
Dept: PULMONOLOGY | Age: 53
End: 2018-12-06

## 2018-12-19 ENCOUNTER — HOSPITAL ENCOUNTER (OUTPATIENT)
Dept: RADIATION ONCOLOGY | Age: 53
Discharge: HOME | End: 2018-12-19
Payer: MEDICARE

## 2018-12-19 VITALS
RESPIRATION RATE: 18 BRPM | DIASTOLIC BLOOD PRESSURE: 88 MMHG | WEIGHT: 177.2 LBS | HEART RATE: 86 BPM | BODY MASS INDEX: 30.42 KG/M2 | SYSTOLIC BLOOD PRESSURE: 140 MMHG | TEMPERATURE: 97.6 F

## 2018-12-19 DIAGNOSIS — C50.912 INVASIVE DUCTAL CARCINOMA OF LEFT BREAST (HCC): Primary | ICD-10-CM

## 2018-12-19 PROCEDURE — 99213 OFFICE O/P EST LOW 20 MIN: CPT | Performed by: RADIOLOGY

## 2018-12-19 NOTE — ONCOLOGY
UNITS CAPS capsule Take 1,000 Units by mouth daily   Yes Historical Provider, MD   traMADol (ULTRAM) 50 MG tablet Take 50 mg by mouth every 6 hours as needed. 3/30/15  Yes Historical Provider, MD   albuterol (PROVENTIL) (2.5 MG/3ML) 0.083% nebulizer solution Take 3 mLs by nebulization every 6 hours as needed for Wheezing 10/3/18   Trace Gonzalez MD   albuterol sulfate HFA (PROAIR HFA) 108 (90 Base) MCG/ACT inhaler Inhale 2 puffs into the lungs every 6 hours as needed for Wheezing 10/3/18   Trace Gonzalez MD     ECOG PERFORMANCE STATUS: 0    VITAL SIGNS:    Vitals:    12/19/18 1511   BP: (!) 140/88   Pulse: 86   Resp: 18   Temp: 97.6 °F (36.4 °C)   Weight: 177 lb 3.2 oz (80.4 kg)     PHYSICAL EXAMINATION:  GENERAL: No acute distress. Alert, oriented, cooperative. HEENT:  PERRLA, EOMI. Oral cavity WNL. NECK:  No Palpable cervical or supraclavicular adenopathy. CHEST/LUNGS: CTA  CARDIOVASCULAR:  RRR, no audible murmur  ABDOMEN:  Soft nontender nondistended, normal bowel sounds  EXTREMITIES: No C/C/E, in particular no left upper extremity lymphedema  BREASTS: Examined both sitting and supine. Good cosmetic outcome. Lateral to the left nipple there is a horizontal scar measuring about 3 cm in length, and a second axillary scar. The scar adjacent to the nipple is somewhat tender. There is no erythema or warmth suggestive of infection. No nipple discharge. No palpable mass or seroma deep to the skin wound. No mass elsewhere within the breast.  Right breast within normal limits. No axillary adenopathy on either side. STUDIES: none    IMPRESSION/PLAN:  Clinically DINO. The patient remains on anastrozole, which she appears to be tolerating better. She is following closely with Dr. Nadine Garcia, seeing him every 3 months. Mammograms have been scheduled. She was encouraged to do monthly self exams. She will return for follow-up here in 6 months with Dr. Kieran Craig.       Electronically signed by Amairani Lynn MD on 12/19/18 at 4:06 PM      Thank you for allowing us to participate in the care of this patient.   cc:   No att. providers found  MD Harpreet Story MD  [id number]  [place], [address] [place] [address]

## 2019-01-04 ENCOUNTER — OFFICE VISIT (OUTPATIENT)
Dept: INTERNAL MEDICINE CLINIC | Age: 54
End: 2019-01-04
Payer: MEDICARE

## 2019-01-04 VITALS
WEIGHT: 175 LBS | OXYGEN SATURATION: 98 % | SYSTOLIC BLOOD PRESSURE: 130 MMHG | HEIGHT: 64 IN | DIASTOLIC BLOOD PRESSURE: 80 MMHG | HEART RATE: 84 BPM | BODY MASS INDEX: 29.88 KG/M2 | TEMPERATURE: 97.6 F

## 2019-01-04 DIAGNOSIS — F33.41 MAJOR DEPRESSIVE DISORDER, RECURRENT EPISODE, IN PARTIAL REMISSION (HCC): ICD-10-CM

## 2019-01-04 DIAGNOSIS — Z01.419 VISIT FOR PELVIC EXAM: Primary | ICD-10-CM

## 2019-01-04 DIAGNOSIS — Z12.11 COLON CANCER SCREENING: ICD-10-CM

## 2019-01-04 DIAGNOSIS — Z01.419 PAPANICOLAOU TEST, AS PART OF ROUTINE GYNECOLOGICAL EXAMINATION: ICD-10-CM

## 2019-01-04 DIAGNOSIS — F32.89 OTHER DEPRESSION: ICD-10-CM

## 2019-01-04 PROCEDURE — 99213 OFFICE O/P EST LOW 20 MIN: CPT | Performed by: INTERNAL MEDICINE

## 2019-01-04 PROCEDURE — G8417 CALC BMI ABV UP PARAM F/U: HCPCS | Performed by: INTERNAL MEDICINE

## 2019-01-04 PROCEDURE — G8484 FLU IMMUNIZE NO ADMIN: HCPCS | Performed by: INTERNAL MEDICINE

## 2019-01-04 PROCEDURE — G8427 DOCREV CUR MEDS BY ELIG CLIN: HCPCS | Performed by: INTERNAL MEDICINE

## 2019-01-04 PROCEDURE — 3017F COLORECTAL CA SCREEN DOC REV: CPT | Performed by: INTERNAL MEDICINE

## 2019-01-04 PROCEDURE — 1036F TOBACCO NON-USER: CPT | Performed by: INTERNAL MEDICINE

## 2019-01-04 RX ORDER — PAROXETINE 10 MG/1
10 TABLET, FILM COATED ORAL EVERY MORNING
Qty: 30 TABLET | Refills: 3 | Status: SHIPPED | OUTPATIENT
Start: 2019-01-04 | End: 2019-03-22 | Stop reason: SDUPTHER

## 2019-01-04 ASSESSMENT — ENCOUNTER SYMPTOMS
CONSTIPATION: 0
BACK PAIN: 1
ABDOMINAL PAIN: 0
RECTAL PAIN: 0
ABDOMINAL DISTENTION: 0
BLOOD IN STOOL: 0
SHORTNESS OF BREATH: 0

## 2019-03-02 ENCOUNTER — HOSPITAL ENCOUNTER (OUTPATIENT)
Dept: GENERAL RADIOLOGY | Age: 54
Discharge: HOME | End: 2019-03-04
Payer: MEDICARE

## 2019-03-02 DIAGNOSIS — Z17.0 MALIGNANT NEOPLASM OF LEFT BREAST IN FEMALE, ESTROGEN RECEPTOR POSITIVE, UNSPECIFIED SITE OF BREAST (HCC): ICD-10-CM

## 2019-03-02 DIAGNOSIS — C50.912 MALIGNANT NEOPLASM OF LEFT BREAST IN FEMALE, ESTROGEN RECEPTOR POSITIVE, UNSPECIFIED SITE OF BREAST (HCC): ICD-10-CM

## 2019-03-02 PROCEDURE — 71046 X-RAY EXAM CHEST 2 VIEWS: CPT

## 2019-03-08 DIAGNOSIS — J44.1 CHRONIC OBSTRUCTIVE PULMONARY DISEASE WITH ACUTE EXACERBATION (HCC): ICD-10-CM

## 2019-03-11 RX ORDER — DILTIAZEM HYDROCHLORIDE 60 MG/1
TABLET, FILM COATED ORAL
Qty: 10.2 G | Refills: 5 | Status: SHIPPED | OUTPATIENT
Start: 2019-03-11

## 2019-03-22 ENCOUNTER — OFFICE VISIT (OUTPATIENT)
Dept: INTERNAL MEDICINE CLINIC | Age: 54
End: 2019-03-22
Payer: MEDICARE

## 2019-03-22 VITALS
HEIGHT: 64 IN | SYSTOLIC BLOOD PRESSURE: 117 MMHG | TEMPERATURE: 98.4 F | OXYGEN SATURATION: 95 % | WEIGHT: 166.8 LBS | DIASTOLIC BLOOD PRESSURE: 74 MMHG | RESPIRATION RATE: 14 BRPM | BODY MASS INDEX: 28.48 KG/M2 | HEART RATE: 92 BPM

## 2019-03-22 DIAGNOSIS — J00 ACUTE NASOPHARYNGITIS: Primary | ICD-10-CM

## 2019-03-22 DIAGNOSIS — F33.41 RECURRENT MAJOR DEPRESSIVE DISORDER, IN PARTIAL REMISSION (HCC): ICD-10-CM

## 2019-03-22 DIAGNOSIS — H92.01 OTALGIA OF RIGHT EAR: ICD-10-CM

## 2019-03-22 PROCEDURE — G8417 CALC BMI ABV UP PARAM F/U: HCPCS | Performed by: INTERNAL MEDICINE

## 2019-03-22 PROCEDURE — G8484 FLU IMMUNIZE NO ADMIN: HCPCS | Performed by: INTERNAL MEDICINE

## 2019-03-22 PROCEDURE — 1036F TOBACCO NON-USER: CPT | Performed by: INTERNAL MEDICINE

## 2019-03-22 PROCEDURE — 3017F COLORECTAL CA SCREEN DOC REV: CPT | Performed by: INTERNAL MEDICINE

## 2019-03-22 PROCEDURE — G8427 DOCREV CUR MEDS BY ELIG CLIN: HCPCS | Performed by: INTERNAL MEDICINE

## 2019-03-22 PROCEDURE — 99213 OFFICE O/P EST LOW 20 MIN: CPT | Performed by: INTERNAL MEDICINE

## 2019-03-22 RX ORDER — LIDOCAINE 50 MG/G
PATCH TOPICAL
COMMUNITY
Start: 2019-03-21

## 2019-03-22 RX ORDER — AZELASTINE 1 MG/ML
2 SPRAY, METERED NASAL 2 TIMES DAILY
Qty: 1 BOTTLE | Refills: 0 | Status: SHIPPED | OUTPATIENT
Start: 2019-03-22

## 2019-03-22 RX ORDER — ECHINACEA PURPUREA EXTRACT 125 MG
1 TABLET ORAL PRN
Qty: 1 BOTTLE | Refills: 0 | Status: SHIPPED | OUTPATIENT
Start: 2019-03-22 | End: 2019-04-12 | Stop reason: ALTCHOICE

## 2019-03-22 RX ORDER — AZITHROMYCIN 250 MG/1
250 TABLET, FILM COATED ORAL SEE ADMIN INSTRUCTIONS
Qty: 6 TABLET | Refills: 0 | Status: SHIPPED | OUTPATIENT
Start: 2019-03-22 | End: 2019-03-27

## 2019-03-22 RX ORDER — PAROXETINE 10 MG/1
10 TABLET, FILM COATED ORAL EVERY MORNING
Qty: 30 TABLET | Refills: 3 | Status: SHIPPED | OUTPATIENT
Start: 2019-03-22 | End: 2019-04-12

## 2019-03-22 ASSESSMENT — ENCOUNTER SYMPTOMS
SINUS PAIN: 0
TROUBLE SWALLOWING: 0
EYE DISCHARGE: 0
EYE PAIN: 0
EYE REDNESS: 0
SORE THROAT: 0
VOICE CHANGE: 0
DIARRHEA: 0
SHORTNESS OF BREATH: 0
SINUS PRESSURE: 0
RHINORRHEA: 1
COUGH: 0

## 2019-03-27 ENCOUNTER — OFFICE VISIT (OUTPATIENT)
Dept: PULMONOLOGY | Age: 54
End: 2019-03-27
Payer: MEDICARE

## 2019-03-27 VITALS
SYSTOLIC BLOOD PRESSURE: 118 MMHG | HEIGHT: 64 IN | DIASTOLIC BLOOD PRESSURE: 70 MMHG | BODY MASS INDEX: 28.54 KG/M2 | OXYGEN SATURATION: 97 % | WEIGHT: 167.2 LBS | RESPIRATION RATE: 16 BRPM | HEART RATE: 87 BPM | TEMPERATURE: 97.7 F

## 2019-03-27 DIAGNOSIS — J01.00 ACUTE MAXILLARY SINUSITIS, RECURRENCE NOT SPECIFIED: Primary | ICD-10-CM

## 2019-03-27 DIAGNOSIS — J44.1 CHRONIC OBSTRUCTIVE PULMONARY DISEASE WITH ACUTE EXACERBATION (HCC): ICD-10-CM

## 2019-03-27 PROCEDURE — 1036F TOBACCO NON-USER: CPT | Performed by: INTERNAL MEDICINE

## 2019-03-27 PROCEDURE — G8926 SPIRO NO PERF OR DOC: HCPCS | Performed by: INTERNAL MEDICINE

## 2019-03-27 PROCEDURE — 99214 OFFICE O/P EST MOD 30 MIN: CPT | Performed by: INTERNAL MEDICINE

## 2019-03-27 PROCEDURE — G8427 DOCREV CUR MEDS BY ELIG CLIN: HCPCS | Performed by: INTERNAL MEDICINE

## 2019-03-27 PROCEDURE — G8484 FLU IMMUNIZE NO ADMIN: HCPCS | Performed by: INTERNAL MEDICINE

## 2019-03-27 PROCEDURE — 3017F COLORECTAL CA SCREEN DOC REV: CPT | Performed by: INTERNAL MEDICINE

## 2019-03-27 PROCEDURE — 3023F SPIROM DOC REV: CPT | Performed by: INTERNAL MEDICINE

## 2019-03-27 PROCEDURE — G8417 CALC BMI ABV UP PARAM F/U: HCPCS | Performed by: INTERNAL MEDICINE

## 2019-03-27 RX ORDER — PREDNISONE 10 MG/1
20 TABLET ORAL DAILY
Qty: 10 TABLET | Refills: 0 | Status: SHIPPED | OUTPATIENT
Start: 2019-03-27 | End: 2019-04-01

## 2019-03-27 RX ORDER — DOXYCYCLINE HYCLATE 100 MG
100 TABLET ORAL 2 TIMES DAILY
Qty: 20 TABLET | Refills: 0 | Status: SHIPPED | OUTPATIENT
Start: 2019-03-27 | End: 2019-04-06

## 2019-03-27 ASSESSMENT — ENCOUNTER SYMPTOMS
SHORTNESS OF BREATH: 0
WHEEZING: 0
SORE THROAT: 1
NAUSEA: 0
SINUS PRESSURE: 1
ABDOMINAL PAIN: 0
CHEST TIGHTNESS: 0
COUGH: 1
DIARRHEA: 0
RHINORRHEA: 1
VOMITING: 0

## 2019-04-02 ENCOUNTER — HOSPITAL ENCOUNTER (EMERGENCY)
Age: 54
Discharge: HOME | End: 2019-04-02
Payer: MEDICARE

## 2019-04-02 ENCOUNTER — APPOINTMENT (OUTPATIENT)
Dept: CT IMAGING | Age: 54
End: 2019-04-02
Payer: MEDICARE

## 2019-04-02 VITALS
HEIGHT: 64 IN | TEMPERATURE: 98 F | HEART RATE: 84 BPM | RESPIRATION RATE: 18 BRPM | BODY MASS INDEX: 28.34 KG/M2 | SYSTOLIC BLOOD PRESSURE: 126 MMHG | OXYGEN SATURATION: 98 % | WEIGHT: 166 LBS | DIASTOLIC BLOOD PRESSURE: 80 MMHG

## 2019-04-02 DIAGNOSIS — R11.0 NAUSEA: Primary | ICD-10-CM

## 2019-04-02 DIAGNOSIS — R19.7 DIARRHEA, UNSPECIFIED TYPE: ICD-10-CM

## 2019-04-02 DIAGNOSIS — R10.84 GENERALIZED ABDOMINAL PAIN: ICD-10-CM

## 2019-04-02 LAB
ALBUMIN SERPL-MCNC: 4.7 G/DL (ref 3.5–4.6)
ALP BLD-CCNC: 65 U/L (ref 40–130)
ALT SERPL-CCNC: 13 U/L (ref 0–33)
ANION GAP SERPL CALCULATED.3IONS-SCNC: 13 MEQ/L (ref 9–15)
AST SERPL-CCNC: 17 U/L (ref 0–35)
BACTERIA: ABNORMAL /HPF
BASOPHILS ABSOLUTE: 0.1 K/UL (ref 0–0.2)
BASOPHILS RELATIVE PERCENT: 0.8 %
BILIRUB SERPL-MCNC: 0.3 MG/DL (ref 0.2–0.7)
BILIRUBIN URINE: NEGATIVE
BLOOD, URINE: NEGATIVE
BUN BLDV-MCNC: 10 MG/DL (ref 6–20)
CALCIUM SERPL-MCNC: 9.6 MG/DL (ref 8.5–9.9)
CHLORIDE BLD-SCNC: 97 MEQ/L (ref 95–107)
CLARITY: CLEAR
CO2: 25 MEQ/L (ref 20–31)
COLOR: YELLOW
CREAT SERPL-MCNC: 0.41 MG/DL (ref 0.5–0.9)
EOSINOPHILS ABSOLUTE: 0.1 K/UL (ref 0–0.7)
EOSINOPHILS RELATIVE PERCENT: 0.6 %
EPITHELIAL CELLS, UA: ABNORMAL /HPF (ref 0–5)
GFR AFRICAN AMERICAN: >60
GFR NON-AFRICAN AMERICAN: >60
GLOBULIN: 2.5 G/DL (ref 2.3–3.5)
GLUCOSE BLD-MCNC: 90 MG/DL (ref 70–99)
GLUCOSE URINE: NEGATIVE MG/DL
HCT VFR BLD CALC: 40.3 % (ref 37–47)
HEMOGLOBIN: 13.9 G/DL (ref 12–16)
HYALINE CASTS: ABNORMAL /HPF (ref 0–5)
KETONES, URINE: NEGATIVE MG/DL
LEUKOCYTE ESTERASE, URINE: ABNORMAL
LIPASE: 37 U/L (ref 12–95)
LYMPHOCYTES ABSOLUTE: 2 K/UL (ref 1–4.8)
LYMPHOCYTES RELATIVE PERCENT: 16 %
MCH RBC QN AUTO: 32.6 PG (ref 27–31.3)
MCHC RBC AUTO-ENTMCNC: 34.4 % (ref 33–37)
MCV RBC AUTO: 94.7 FL (ref 82–100)
MONOCYTES ABSOLUTE: 0.6 K/UL (ref 0.2–0.8)
MONOCYTES RELATIVE PERCENT: 5 %
NEUTROPHILS ABSOLUTE: 9.6 K/UL (ref 1.4–6.5)
NEUTROPHILS RELATIVE PERCENT: 77.6 %
NITRITE, URINE: NEGATIVE
PDW BLD-RTO: 12.9 % (ref 11.5–14.5)
PH UA: 6.5 (ref 5–9)
PLATELET # BLD: 332 K/UL (ref 130–400)
POTASSIUM SERPL-SCNC: 3.8 MEQ/L (ref 3.4–4.9)
PROTEIN UA: NEGATIVE MG/DL
RBC # BLD: 4.25 M/UL (ref 4.2–5.4)
RBC UA: ABNORMAL /HPF (ref 0–5)
SODIUM BLD-SCNC: 135 MEQ/L (ref 135–144)
SPECIFIC GRAVITY UA: 1.01 (ref 1–1.03)
TOTAL PROTEIN: 7.2 G/DL (ref 6.3–8)
URINE REFLEX TO CULTURE: YES
UROBILINOGEN, URINE: 0.2 E.U./DL
WBC # BLD: 12.4 K/UL (ref 4.8–10.8)
WBC UA: ABNORMAL /HPF (ref 0–5)

## 2019-04-02 PROCEDURE — 96375 TX/PRO/DX INJ NEW DRUG ADDON: CPT

## 2019-04-02 PROCEDURE — 87086 URINE CULTURE/COLONY COUNT: CPT

## 2019-04-02 PROCEDURE — 96374 THER/PROPH/DIAG INJ IV PUSH: CPT

## 2019-04-02 PROCEDURE — 2580000003 HC RX 258: Performed by: NURSE PRACTITIONER

## 2019-04-02 PROCEDURE — 36415 COLL VENOUS BLD VENIPUNCTURE: CPT

## 2019-04-02 PROCEDURE — 80053 COMPREHEN METABOLIC PANEL: CPT

## 2019-04-02 PROCEDURE — 81001 URINALYSIS AUTO W/SCOPE: CPT

## 2019-04-02 PROCEDURE — 74176 CT ABD & PELVIS W/O CONTRAST: CPT

## 2019-04-02 PROCEDURE — 83690 ASSAY OF LIPASE: CPT

## 2019-04-02 PROCEDURE — 99284 EMERGENCY DEPT VISIT MOD MDM: CPT

## 2019-04-02 PROCEDURE — 6360000002 HC RX W HCPCS: Performed by: NURSE PRACTITIONER

## 2019-04-02 PROCEDURE — 85025 COMPLETE CBC W/AUTO DIFF WBC: CPT

## 2019-04-02 RX ORDER — 0.9 % SODIUM CHLORIDE 0.9 %
1000 INTRAVENOUS SOLUTION INTRAVENOUS ONCE
Status: COMPLETED | OUTPATIENT
Start: 2019-04-02 | End: 2019-04-02

## 2019-04-02 RX ORDER — KETOROLAC TROMETHAMINE 30 MG/ML
30 INJECTION, SOLUTION INTRAMUSCULAR; INTRAVENOUS ONCE
Status: COMPLETED | OUTPATIENT
Start: 2019-04-02 | End: 2019-04-02

## 2019-04-02 RX ORDER — ONDANSETRON 2 MG/ML
4 INJECTION INTRAMUSCULAR; INTRAVENOUS ONCE
Status: COMPLETED | OUTPATIENT
Start: 2019-04-02 | End: 2019-04-02

## 2019-04-02 RX ORDER — ONDANSETRON 4 MG/1
4-8 TABLET, ORALLY DISINTEGRATING ORAL EVERY 12 HOURS PRN
Qty: 12 TABLET | Refills: 0 | Status: SHIPPED | OUTPATIENT
Start: 2019-04-02

## 2019-04-02 RX ORDER — PROMETHAZINE HYDROCHLORIDE 25 MG/1
12.5-25 TABLET ORAL EVERY 6 HOURS PRN
Qty: 12 TABLET | Refills: 0 | Status: SHIPPED | OUTPATIENT
Start: 2019-04-02 | End: 2019-04-09

## 2019-04-02 RX ADMIN — KETOROLAC TROMETHAMINE 30 MG: 30 INJECTION, SOLUTION INTRAMUSCULAR at 09:57

## 2019-04-02 RX ADMIN — SODIUM CHLORIDE 1000 ML: 9 INJECTION, SOLUTION INTRAVENOUS at 09:57

## 2019-04-02 RX ADMIN — ONDANSETRON 4 MG: 2 INJECTION INTRAMUSCULAR; INTRAVENOUS at 09:57

## 2019-04-02 ASSESSMENT — ENCOUNTER SYMPTOMS
DIARRHEA: 1
COUGH: 0
SORE THROAT: 0
VOMITING: 0
RHINORRHEA: 0
EYE PAIN: 0
BACK PAIN: 0
PHOTOPHOBIA: 0
NAUSEA: 1
ABDOMINAL PAIN: 1
SHORTNESS OF BREATH: 0

## 2019-04-02 ASSESSMENT — PAIN DESCRIPTION - PAIN TYPE
TYPE: ACUTE PAIN
TYPE: ACUTE PAIN

## 2019-04-02 ASSESSMENT — PAIN DESCRIPTION - ONSET
ONSET: ON-GOING
ONSET: ON-GOING

## 2019-04-02 ASSESSMENT — PAIN SCALES - GENERAL
PAINLEVEL_OUTOF10: 8
PAINLEVEL_OUTOF10: 7
PAINLEVEL_OUTOF10: 8

## 2019-04-02 ASSESSMENT — PAIN DESCRIPTION - LOCATION
LOCATION: GENERALIZED;ABDOMEN
LOCATION: ABDOMEN

## 2019-04-02 ASSESSMENT — PAIN DESCRIPTION - PROGRESSION
CLINICAL_PROGRESSION: GRADUALLY WORSENING
CLINICAL_PROGRESSION: NOT CHANGED

## 2019-04-02 ASSESSMENT — PAIN DESCRIPTION - FREQUENCY
FREQUENCY: INTERMITTENT
FREQUENCY: INTERMITTENT

## 2019-04-02 ASSESSMENT — PAIN DESCRIPTION - ORIENTATION: ORIENTATION: LOWER

## 2019-04-02 ASSESSMENT — PAIN DESCRIPTION - DESCRIPTORS: DESCRIPTORS: ACHING;CONSTANT;DISCOMFORT

## 2019-04-02 NOTE — PROGRESS NOTES
Patient stated she is allergic to dye and contrast. Patient refused abdominal CT with contrast. Order changed to CT abdomen without contrast. Patient taken to CT.

## 2019-04-02 NOTE — ED PROVIDER NOTES
[facility] ED  eMERGENCY dEPARTMENT eNCOUnter      Pt Name: Cande Zaman  MRN: [id number]  [place] 1965  Date of evaluation: 4/2/2019  Provider: MAURICIO Hurt [id number] [facility]       Chief Complaint   Patient presents with    Abdominal Pain     with nausea, diarrhea, and shaking. HISTORY OF PRESENT ILLNESS   (Location/Symptom, Timing/Onset,Context/Setting, Quality, Duration, Modifying Factors, Severity)  Note limiting factors. Cande Zaman is a 47 y.o. female who presents to the emergency department with complaints of nausea and diarrhea that began this morning. She denies modifying factors. No change in appetite or oral intake, tolerating fluids. No fever. No dizziness, lightheadedness, fsc, cp, sob or palpitations. Location/Symptom - nausea, diarrhea  Timing/Onset - today  Context/Setting - as above  Quality - nausea  Duration - today  Modifying Factors - none  Severity - moderate    Nursing Notes were reviewed. REVIEW OF SYSTEMS    (2-9 systems for level 4, 10 or more for level 5)     Review of Systems   Constitutional: Negative for chills, diaphoresis, fatigue and fever. HENT: Negative for congestion, rhinorrhea and sore throat. Eyes: Negative for photophobia and pain. Respiratory: Negative for cough and shortness of breath. Cardiovascular: Negative for chest pain and palpitations. Gastrointestinal: Positive for abdominal pain, diarrhea and nausea. Negative for vomiting. Genitourinary: Negative for dysuria and flank pain. Musculoskeletal: Negative for back pain. Skin: Negative for rash. Neurological: Negative for dizziness, light-headedness and headaches. Psychiatric/Behavioral: Negative. All other systems reviewed and are negative. Except as noted above the remainder of the review of systems was reviewed and negative.        PAST MEDICAL HISTORY     Past Medical History:   Diagnosis Date    Asthma     Chronic back pain greater than 3 months duration     Dr Adeel Valentin    COPD (chronic obstructive pulmonary disease) [facility]) 2017    Dr Caryle Scott     Dr Raquel Thomas [facility]), hospital admissions, counseling [employer]    Family history of malignant neoplasm of breast     H/O vitamin D deficiency     Invasive ductal carcinoma of left breast ([address] 2018    [id number] ERPR+ her2-, radiation, no chemo, Dr Thakur Ra     Dr Adeel Valentin     Past Surgical History:   Procedure Laterality Date    BREAST CYST EXCISION Right     benign    BREAST LUMPECTOMY      and [facility]N evaluation    DILATION AND CURETTAGE OF UTERUS      due to [address]      right index finger / exc tumor mass / at age 9   [facility] SD BX BREAST W DEVICE 1ST LESION ULTRASOUND GUIDE Left 2018    US guided left breast biopsy, excisional    SD REMOVAL OF BREAST LESION Left 2018    RE EXCISION BREAST CANCER SITE, SENTINEL NODE BIOPSY    TONSILLECTOMY      at age 12     Social History     Socioeconomic History    Marital status: Single     Spouse name: None    Number of children: 2    Years of education: None    Highest education level: None   Occupational History    Occupation: , now on [address] resource strain: None    Food insecurity:     Worry: None     Inability: None    Transportation needs:     Medical: None     Non-medical: None   Tobacco Use    Smoking status: Former Smoker     Packs/day: 1.00     Years: 20.00     Pack years: 20.00     Types: Cigarettes     Last attempt to quit: 2017     Years since quittin.3    Smokeless tobacco: Never Used   Substance and Sexual Activity    Alcohol use:  Yes     Alcohol/week: 0.0 oz     Comment: social    Drug use: No    Sexual activity: Yes   Lifestyle    Physical activity:     Days per week: None     Minutes per session: None    Stress: None   Relationships    Social connections:     Talks on phone: None     Gets together: None Attends Religion service: None     Active member of club or organization: None     Attends meetings of clubs or organizations: None     Relationship status: None    Intimate partner violence:     Fear of current or ex partner: None     Emotionally abused: None     Physically abused: None     Forced sexual activity: None   Other Topics Concern    None   Social History Narrative    Born in [place], one of 3 siblings, 2 half sisters (does not talk to them since 2010)    Single, , 2 sons, [facility] [place] [facility], does some babysitting    Lives in a mobil home in [place] alone    Worked as a  x 15 years    [address], attends [employer]     On [employer] for depression x 2011       SCREENINGS    [place] Coma Scale  Eye Opening: Spontaneous  Best Verbal Response: Oriented  Best Motor Response: Obeys commands  Angel Coma Scale Score: 15 @FLOW([id number])@      PHYSICAL EXAM    (up to 7 for level 4, 8 or more for level 5)     ED Triage Vitals [04/02/19 0900]   BP Temp Temp Source Pulse Resp SpO2 Height Weight   119/81 98 °F (36.7 °C) Oral 98 18 97 % 5' 4\" (1.626 m) 166 lb (75.3 kg)       Physical Exam   Constitutional: She is oriented to person, place, and time. She appears well-developed and well-nourished. She is active. No distress. HENT:   Head: Normocephalic and atraumatic. Mouth/Throat: Mucous membranes are normal.   Eyes: Conjunctivae and lids are normal.   Neck: Normal range of motion. Neck supple. Cardiovascular: Normal rate, regular rhythm, normal heart sounds, intact distal pulses and normal pulses. Pulmonary/Chest: Effort normal and breath sounds normal.   Abdominal: Soft. Normal appearance and bowel sounds are normal. There is generalized tenderness. There is no rigidity, no rebound, no guarding and no CVA tenderness. Lymphadenopathy:     She has no cervical adenopathy. Neurological: She is alert and oriented to person, place, and time. She has normal strength. Skin: Skin is warm, dry and intact. Capillary refill takes less than 2 seconds. No rash noted. She is not diaphoretic. Psychiatric: Judgment and thought content normal.   Nursing note and vitals reviewed. DIAGNOSTIC RESULTS     EKG: All EKG's are interpreted by the Emergency Department Physician who either signs or Co-signsthis chart in the absence of a cardiologist.        RADIOLOGY:   Griselda Dolphin such as CT, Ultrasound and MRI are read by the radiologist. [facility] radiographic images are visualized and preliminarily interpreted by the emergency physician with the below findings:        Interpretation per the Radiologist below, if available at the time ofthis note:    CT ABDOMEN PELVIS WO CONTRAST Additional Contrast? None   Final Result      Negative CT abdomen pelvis. All CT scans at this facility use dose modulation, iterative reconstruction, and/or weight based dosing when appropriate to reduce radiation dose to as low as reasonably achievable. ED BEDSIDE ULTRASOUND:   Performed by ED Physician - none    LABS:  Labs Reviewed   CBC WITH AUTO DIFFERENTIAL - Abnormal; Notable for the following components:       Result Value    WBC 12.4 (*)     MCH 32.6 (*)     Neutrophils # 9.6 (*)     All other components within normal limits   COMPREHENSIVE METABOLIC PANEL - Abnormal; Notable for the following components:    CREATININE 0.41 (*)     Alb 4.7 (*)     All other components within normal limits   URINE RT REFLEX TO CULTURE - Abnormal; Notable for the following components:    Leukocyte Esterase, Urine TRACE (*)     All other components within normal limits   MICROSCOPIC URINALYSIS - Abnormal; Notable for the following components:    Bacteria, UA FEW (*)     WBC, UA 6-10 (*)     All other components within normal limits   URINE CULTURE   LIPASE   POCT CREATININE       All other labs were within normal range or not returned as of this dictation.     EMERGENCY DEPARTMENT COURSE day  For continued evaluation and management      DISCHARGE MEDICATIONS:  New Prescriptions    ONDANSETRON (ZOFRAN ODT) 4 MG DISINTEGRATING TABLET    Take 1-2 tablets by mouth every 12 hours as needed for Nausea May Sub regular tablet (non-ODT) if insurance does not cover ODT.     PROMETHAZINE (PHENERGAN) 25 MG TABLET    Take 0.5-1 tablets by mouth every 6 hours as needed for Nausea          (Please notethat portions of this note were completed with a voice recognition program.  Efforts were made to edit the dictations but occasionally words are mis-transcribed.)    MAURICIO Peter CNP (electronically signed)  Attending Emergency Physician          MAURICIO Peter CNP  04/02/19 5712

## 2019-04-03 LAB — URINE CULTURE, ROUTINE: NORMAL

## 2019-04-12 ENCOUNTER — OFFICE VISIT (OUTPATIENT)
Dept: INTERNAL MEDICINE CLINIC | Age: 54
End: 2019-04-12
Payer: MEDICARE

## 2019-04-12 VITALS
OXYGEN SATURATION: 94 % | SYSTOLIC BLOOD PRESSURE: 127 MMHG | RESPIRATION RATE: 16 BRPM | WEIGHT: 159.8 LBS | BODY MASS INDEX: 28.31 KG/M2 | HEART RATE: 88 BPM | DIASTOLIC BLOOD PRESSURE: 88 MMHG | HEIGHT: 63 IN | TEMPERATURE: 97.4 F

## 2019-04-12 DIAGNOSIS — F33.2 SEVERE EPISODE OF RECURRENT MAJOR DEPRESSIVE DISORDER, WITHOUT PSYCHOTIC FEATURES (HCC): Primary | ICD-10-CM

## 2019-04-12 PROCEDURE — G8417 CALC BMI ABV UP PARAM F/U: HCPCS | Performed by: INTERNAL MEDICINE

## 2019-04-12 PROCEDURE — 3017F COLORECTAL CA SCREEN DOC REV: CPT | Performed by: INTERNAL MEDICINE

## 2019-04-12 PROCEDURE — G8427 DOCREV CUR MEDS BY ELIG CLIN: HCPCS | Performed by: INTERNAL MEDICINE

## 2019-04-12 PROCEDURE — 99214 OFFICE O/P EST MOD 30 MIN: CPT | Performed by: INTERNAL MEDICINE

## 2019-04-12 PROCEDURE — 1036F TOBACCO NON-USER: CPT | Performed by: INTERNAL MEDICINE

## 2019-04-12 RX ORDER — FLUOXETINE HYDROCHLORIDE 20 MG/1
20 CAPSULE ORAL DAILY
Qty: 90 CAPSULE | Refills: 2 | Status: ON HOLD | OUTPATIENT
Start: 2019-04-12 | End: 2019-05-10 | Stop reason: HOSPADM

## 2019-04-12 RX ORDER — DOXEPIN HYDROCHLORIDE 10 MG/1
CAPSULE ORAL
Qty: 120 CAPSULE | Refills: 2 | Status: SHIPPED | OUTPATIENT
Start: 2019-04-12 | End: 2019-05-04 | Stop reason: ALTCHOICE

## 2019-04-12 ASSESSMENT — ENCOUNTER SYMPTOMS
EYE PAIN: 0
TROUBLE SWALLOWING: 0
ABDOMINAL DISTENTION: 0
SHORTNESS OF BREATH: 0
CHOKING: 0
ABDOMINAL PAIN: 0
COUGH: 0

## 2019-04-12 NOTE — PROGRESS NOTES
Rachel Ponce is a 47 y.o. female with history of asthma, chronic back pain, COPD, depression, treated breast cancer, who presents with     Chief Complaint   Patient presents with    Depression     X 2 months, \" I should have never stopped the Prozac\"    Other     stopped taking the cancer medication due to side effects       Interim history: The patient comes to the office today accompanied by her close friend of her. The friend worries about patient being very depressed over the past few weeks. Since the last office visit with me one month ago, she has followed with the pulmonologist in the oncologist.  10 days ago, she went to the emergency room for nausea and vomiting, which she attributed to her cancer medication. She did stop the medication and the symptoms resolved. No other interim events. Unfortunately, the patient has become worse. The patient has not been suicidal but has become unable to sleep, with marked anhedonia and frequent crying. The following laboratory reports since the past visit were reviewed (the ones pertinent to today's visit were discussed with the patient/ caregiver):     Admission on 04/02/2019, Discharged on 04/02/2019   Component Date Value Ref Range Status    WBC 04/02/2019 12.4* 4.8 - 10.8 K/uL Final    RBC 04/02/2019 4.25  4.20 - 5.40 M/uL Final    Hemoglobin 04/02/2019 13.9  12.0 - 16.0 g/dL Final    Hematocrit 04/02/2019 40.3  37.0 - 47.0 % Final    MCV 04/02/2019 94.7  82.0 - 100.0 fL Final    MCH 04/02/2019 32.6* 27.0 - 31.3 pg Final    MCHC 04/02/2019 34.4  33.0 - 37.0 % Final    RDW 04/02/2019 12.9  11.5 - 14.5 % Final    Platelets 85/10/9796 332  130 - 400 K/uL Final    Neutrophils % 04/02/2019 77.6  % Final    Lymphocytes % 04/02/2019 16.0  % Final    Monocytes % 04/02/2019 5.0  % Final    Eosinophils % 04/02/2019 0.6  % Final    Basophils % 04/02/2019 0.8  % Final    Neutrophils # 04/02/2019 9.6* 1.4 - 6.5 K/uL Final    Lymphocytes # 04/02/2019 2.0  1.0 - 4.8 K/uL Final    Monocytes # 04/02/2019 0.6  0.2 - 0.8 K/uL Final    Eosinophils # 04/02/2019 0.1  0.0 - 0.7 K/uL Final    Basophils # 04/02/2019 0.1  0.0 - 0.2 K/uL Final    Sodium 04/02/2019 135  135 - 144 mEq/L Final    Comment: Effective:  2/7/2019  New reference range for this analyte has been established.  Potassium 04/02/2019 3.8  3.4 - 4.9 mEq/L Final    Comment: Effective:  2/7/2019  New reference range for this analyte has been established.  Chloride 04/02/2019 97  95 - 107 mEq/L Final    Comment: Effective:  2/7/2019  New reference range for this analyte has been established.  CO2 04/02/2019 25  20 - 31 mEq/L Final    Comment: Effective:  2/7/2019  New reference range for this analyte has been established.  Anion Gap 04/02/2019 13  9 - 15 mEq/L Final    Comment: Effective:  2/7/2019  New reference range for this analyte has been established.  Glucose 04/02/2019 90  70 - 99 mg/dL Final    Comment: Effective:  2/7/2019  New reference range for this analyte has been established.  BUN 04/02/2019 10  6 - 20 mg/dL Final    CREATININE 04/02/2019 0.41* 0.50 - 0.90 mg/dL Final    GFR Non- 04/02/2019 >60.0  >60 Final    Comment: >60 mL/min/1.73m2 EGFR, calc. for ages 25 and older using the  MDRD formula (not corrected for weight), is valid for stable  renal function.  GFR  04/02/2019 >60.0  >60 Final    Comment: >60 mL/min/1.73m2 EGFR, calc. for ages 25 and older using the  MDRD formula (not corrected for weight), is valid for stable  renal function.  Calcium 04/02/2019 9.6  8.5 - 9.9 mg/dL Final    Comment: Effective:  2/7/2019  New reference range for this analyte has been established.  Total Protein 04/02/2019 7.2  6.3 - 8.0 g/dL Final    Comment: Effective:  2/7/2019  New reference range for this analyte has been established.       Alb 04/02/2019 4.7* 3.5 - 4.6 g/dL Final    Comment: Effective:  2/7/2019  New reference range for this analyte has been established.  Total Bilirubin 04/02/2019 0.3  0.2 - 0.7 mg/dL Final    Comment: Effective:  2/7/2019  New reference range for this analyte has been established.  Alkaline Phosphatase 04/02/2019 65  40 - 130 U/L Final    ALT 04/02/2019 13  0 - 33 U/L Final    AST 04/02/2019 17  0 - 35 U/L Final    Globulin 04/02/2019 2.5  2.3 - 3.5 g/dL Final    Color, UA 04/02/2019 Yellow  Straw/Yellow Final    Clarity, UA 04/02/2019 Clear  Clear Final    Glucose, Ur 04/02/2019 Negative  Negative mg/dL Final    Bilirubin Urine 04/02/2019 Negative  Negative Final    Ketones, Urine 04/02/2019 Negative  Negative mg/dL Final    Specific Gravity, UA 04/02/2019 1.009  1.005 - 1.030 Final    Blood, Urine 04/02/2019 Negative  Negative Final    pH, UA 04/02/2019 6.5  5.0 - 9.0 Final    Protein, UA 04/02/2019 Negative  Negative mg/dL Final    Urobilinogen, Urine 04/02/2019 0.2  <2.0 E.U./dL Final    Nitrite, Urine 04/02/2019 Negative  Negative Final    Leukocyte Esterase, Urine 04/02/2019 TRACE* Negative Final    Urine Reflex to Culture 04/02/2019 YES   Final    Lipase 04/02/2019 37  12 - 95 U/L Final    Comment: Effective:  2/7/2019  New reference range for this analyte has been established.  Urine Culture, Routine 04/02/2019 No growth 24 hours   Final    Bacteria, UA 04/02/2019 FEW* /HPF Final    Hyaline Casts, UA 04/02/2019 0-1  0 - 5 /HPF Final    Comment: Effective 11/26/2018    Urinalysis microscopic performed using the  automated methodology (AUWI analyzer).  WBC, UA 04/02/2019 6-10* 0 - 5 /HPF Final    Comment: Effective 11/26/2018    Urinalysis microscopic performed using the  automated methodology (AUWI analyzer).  RBC, UA 04/02/2019 0-2  0 - 5 /HPF Final    Comment: Effective 11/26/2018    Urinalysis microscopic performed using the  automated methodology (AUWI analyzer).       Epi Cells 04/02/2019 6-10  0 - 5 /HPF Final    Comment: Effective 2018    Urinalysis microscopic performed using the  automated methodology (AUWI analyzer). HPI:    HPI    More detail above in the chiefcomplaint(s), interim history and below in the review of systems.      Past Medical History:   Diagnosis Date    Asthma     Chronic back pain greater than 3 months duration     Dr Benny Smith COPD (chronic obstructive pulmonary disease) [facility])     Dr Brad Cabrera Depression     Dr Ailyn Hernandez [facility]), hospital admissions, counseling Arsofie Cevallosconsuelo    Family history of malignant neoplasm of breast     H/O vitamin D deficiency     Invasive ductal carcinoma of left breast ([facility] [address] 2018    T1N0M0 ERPR+ her2-, radiation, no chemo, Dr Paulla Harman Dr Austine Bumpers       Past Surgical History:   Procedure Laterality Date    BREAST CYST EXCISION Right     benign    BREAST LUMPECTOMY      and SLN evaluation    DILATION AND CURETTAGE OF UTERUS      due to [address]      right index finger / exc tumor mass / at age 9   Cain SD BX BREAST W DEVICE 1ST LESION ULTRASOUND GUIDE Left 2018    US guided left breast biopsy, excisional    SD REMOVAL OF BREAST LESION Left 2018    RE EXCISION BREAST CANCER SITE, SENTINEL NODE BIOPSY    TONSILLECTOMY      at age 12       Social History     Socioeconomic History    Marital status: Single     Spouse name: Not on file    Number of children: 2    Years of education: Not on file    Highest education level: Not on file   Occupational History    Occupation: , now on [address] resource strain: Not on file    Food insecurity:     Worry: Not on file     Inability: Not on file   [employer] needs:     Medical: Not on file     Non-medical: Not on file   Tobacco Use    Smoking status: Former Smoker     Packs/day: 1.00     Years: 20.00     Pack years: 20.00     Types: Cigarettes     Last attempt to quit: 2017     Years since quittin.4    Smokeless tobacco: Never Used   Substance and Sexual Activity    Alcohol use: Yes     Alcohol/week: 0.0 oz     Comment: social    Drug use: No    Sexual activity: Yes   Lifestyle    Physical activity:     Days per week: Not on file     Minutes per session: Not on file    Stress: Not on file   Relationships    Social connections:     Talks on phone: Not on file     Gets together: Not on file     Attends Yazidi service: Not on file     Active member of club or organization: Not on file     Attends meetings of clubs or organizations: Not on file     Relationship status: Not on file    Intimate partner violence:     Fear of current or ex partner: Not on file     Emotionally abused: Not on file     Physically abused: Not on file     Forced sexual activity: Not on file   Other Topics Concern    Not on file   Social History Narrative    Born in [place], one of 3 siblings, 2 half sisters (does not talk to them since 2010)    Single, , 2 sons, in [place] and [address] [place] [address] 5, does some babysitting    Lives in a mobil home in [place] alone    Worked as a  x 15 years    [place] [address], attends [employer]     On Medicare for depression x 2011       Family History   Problem Relation Age of Onset    Heart Failure Mother         dec 80    Osteoarthritis Mother     Cancer Mother 61        breast     Breast Cancer Mother     Heart Attack Father         dec 58    Hypertension Father     High Cholesterol Father     Stroke Sister     No Known Problems Son        Family and socialhistory were reviewed and pertinent changes documented. ALLERGIES    Tamoxifen; Morphine; Abilify [aripiprazole]; Dye [gadolinium derivatives];  Effexor [venlafaxine]; and Seasonal    Current Outpatient Medications on File Prior to Visit   Medication Sig Dispense Refill    ondansetron (ZOFRAN ODT) 4 MG disintegrating tablet Take 1-2 tablets by mouth every 12 hours as needed for Nausea May Sub regular tablet (non-ODT) if insurance does not cover ODT. 12 tablet 0    lidocaine (LIDODERM) 5 %       azelastine (ASTELIN) 0.1 % nasal spray 2 sprays by Nasal route 2 times daily 2 Spray in each nostril 1 Bottle 0    SYMBICORT 80-4.5 MCG/ACT AERO inhale 2 puffs by mouth twice a day 10.2 g 5    tiZANidine (ZANAFLEX) 4 MG tablet take 1 tablet by mouth at bedtime  0    albuterol (PROVENTIL) (2.5 MG/3ML) 0.083% nebulizer solution Take 3 mLs by nebulization every 6 hours as needed for Wheezing 120 each 3    albuterol sulfate HFA (PROAIR HFA) 108 (90 Base) MCG/ACT inhaler Inhale 2 puffs into the lungs every 6 hours as needed for Wheezing 1 Inhaler 3    Multiple Vitamins-Minerals (MULTIVITAMIN PO) Take by mouth      Vitamin D (CHOLECALCIFEROL) 1000 UNITS CAPS capsule Take 1,000 Units by mouth daily      traMADol (ULTRAM) 50 MG tablet Take 50 mg by mouth every 6 hours as needed. 0    LYRICA 50 MG capsule Take 50 mg by mouth 2 times daily. 0     No current facility-administered medications on file prior to visit. Review of Systems   Constitutional: Negative for chills, fatigue and fever. HENT: Negative for nosebleeds and trouble swallowing. Eyes: Negative for pain. Respiratory: Negative for cough, choking and shortness of breath. Cardiovascular: Negative for chest pain and palpitations. Gastrointestinal: Negative for abdominal distention and abdominal pain. Endocrine: Negative for cold intolerance and heat intolerance. Genitourinary: Negative for dysuria and hematuria. Musculoskeletal: Negative for myalgias. Skin: Negative for rash. Neurological: Negative for dizziness, tremors, weakness and light-headedness. Psychiatric/Behavioral: Positive for dysphoric mood and sleep disturbance. Negative for confusion, decreased concentration, self-injury and suicidal ideas. The patient is nervous/anxious.         Vitals:    04/12/19 1143   BP: 127/88   Site: Right Upper Arm   Position: Sitting   Cuff Size: Medium Adult   Pulse: 88   Resp: 16   Temp: 97.4 °F (36.3 °C)   TempSrc: Tympanic   SpO2: 94%   Weight: 159 lb 12.8 oz (72.5 kg)   Height: 5' 3\" (1.6 m)       Physical Exam   Constitutional: She is oriented to person, place, and time. She appears well-nourished. She appears distressed (crying). HENT:   Head: Atraumatic. Eyes: Conjunctivae and EOM are normal.   Neck: Neck supple. Cardiovascular: Regular rhythm and normal heart sounds. Pulmonary/Chest: Effort normal. No respiratory distress. Abdominal: Soft. She exhibits no distension. Musculoskeletal: Normal range of motion. Neurological: She is alert and oriented to person, place, and time. Coordination normal.   Skin: Skin is warm. No rash noted. No pallor. Psychiatric: Her speech is normal. Her mood appears anxious. She is not slowed and not withdrawn. Thought content is not delusional. Cognition and memory are not impaired. She does not express inappropriate judgment. She exhibits a depressed mood. She expresses no suicidal ideation. Assessment:    [facility] for [facility] and other. Diagnoses and all orders for this visit:    Severe episode of recurrent major depressive disorder, without psychotic features [facility])              The patient is referred for counseling as soon as possible, in the past she has been under psychiatric care and even hospitalized. Keep close in touch with friends and with our office. Resume Prozac and use doxepin 3 times a day as needed for anxiety and at at bedtime for sleep  -     Angela Colbert, PhD, Psychology, [place]    Other orders  -     FLUoxetine (PROZAC) 20 MG capsule; Take 1 capsule by mouth daily  -     doxepin (SINEQUAN) 10 MG capsule; Take 1 tab po tid as needed for anxiety and 1 po daily at HS        Plan:    Reviewed with the patient (/and caregiver if present): current health status, medications, activities and diet. See also orders and comments in the assessment section.    Today's diagnosis (in the context ofchronic problems) was discussed with patient (/and caregiver if present), questions answered. Side effects, adverse effects of the medication prescribed (or refilled) today, treatment plan/ rationale and result expectations have (again) been discussed with the patient who expresses understanding and consents to proceed as outlined above. Additional advise included in the \"after visit summary\". Orders Placed This Encounter   Medications    FLUoxetine (PROZAC) 20 MG capsule     Sig: Take 1 capsule by mouth daily     Dispense:  90 capsule     Refill:  2    doxepin (SINEQUAN) 10 MG capsule     Sig: Take 1 tab po tid as needed for anxiety and 1 po daily at HS     Dispense:  120 capsule     Refill:  2       Orders Placed This Encounter   Procedures   Maria Del Rosario Woodruff, PhD, Psychology, [facility]     Referral Priority:   Urgent     Referral Type:   Eval and Treat     Referral Reason:   Specialty Services Required     Referred to Provider:   RAMAN Adams     Requested Specialty:   Psychology     Number of Visits Requested:   1     Further workup and plan will be determined based on clinical progression and follow up test/ treatment results. Close follow up needed to evaluate treatment results and for care coordination. Return in about 2 months (around 6/12/2019). I have reviewed the patient's medical and surgical, family and social history, health maintenance schedule, and updated the computerized patient record. Please note this report has been partially produced by using speech recognition hardware. It may contain errors related to the system, including grammar, punctuation and spelling as well as words and phrases that may seem inaccurate. For anyquestions or concerns, please feel free to contact me for clarification.         Electronically signed by Orlin Argueta MD

## 2019-05-03 ENCOUNTER — HOSPITAL ENCOUNTER (INPATIENT)
Age: 54
LOS: 6 days | Discharge: HOME | DRG: 885 | End: 2019-05-10
Attending: EMERGENCY MEDICINE | Admitting: PSYCHIATRY & NEUROLOGY
Payer: MEDICARE

## 2019-05-03 DIAGNOSIS — F32.2 CURRENT SEVERE EPISODE OF MAJOR DEPRESSIVE DISORDER WITHOUT PSYCHOTIC FEATURES WITHOUT PRIOR EPISODE (HCC): Primary | ICD-10-CM

## 2019-05-03 DIAGNOSIS — F41.1 GAD (GENERALIZED ANXIETY DISORDER): ICD-10-CM

## 2019-05-03 LAB
ALBUMIN SERPL-MCNC: 4.4 G/DL (ref 3.5–4.6)
ALP BLD-CCNC: 69 U/L (ref 40–130)
ALT SERPL-CCNC: 14 U/L (ref 0–33)
AMPHETAMINE SCREEN, URINE: ABNORMAL
ANION GAP SERPL CALCULATED.3IONS-SCNC: 16 MEQ/L (ref 9–15)
AST SERPL-CCNC: 19 U/L (ref 0–35)
BARBITURATE SCREEN URINE: ABNORMAL
BASOPHILS ABSOLUTE: 0.1 K/UL (ref 0–0.2)
BASOPHILS RELATIVE PERCENT: 0.7 %
BENZODIAZEPINE SCREEN, URINE: ABNORMAL
BILIRUB SERPL-MCNC: 0.3 MG/DL (ref 0.2–0.7)
BILIRUBIN URINE: NEGATIVE
BLOOD, URINE: NEGATIVE
BUN BLDV-MCNC: 11 MG/DL (ref 6–20)
CALCIUM SERPL-MCNC: 9.8 MG/DL (ref 8.5–9.9)
CANNABINOID SCREEN URINE: POSITIVE
CHLORIDE BLD-SCNC: 99 MEQ/L (ref 95–107)
CLARITY: ABNORMAL
CO2: 26 MEQ/L (ref 20–31)
COCAINE METABOLITE SCREEN URINE: ABNORMAL
COLOR: YELLOW
CREAT SERPL-MCNC: 0.6 MG/DL (ref 0.5–0.9)
EKG ATRIAL RATE: 84 BPM
EKG P AXIS: 60 DEGREES
EKG P-R INTERVAL: 152 MS
EKG Q-T INTERVAL: 396 MS
EKG QRS DURATION: 88 MS
EKG QTC CALCULATION (BAZETT): 467 MS
EKG R AXIS: 63 DEGREES
EKG T AXIS: 39 DEGREES
EKG VENTRICULAR RATE: 84 BPM
EOSINOPHILS ABSOLUTE: 0.1 K/UL (ref 0–0.7)
EOSINOPHILS RELATIVE PERCENT: 1 %
ETHANOL PERCENT: NORMAL G/DL
ETHANOL: <10 MG/DL (ref 0–0.08)
GFR AFRICAN AMERICAN: >60
GFR NON-AFRICAN AMERICAN: >60
GLOBULIN: 2.9 G/DL (ref 2.3–3.5)
GLUCOSE BLD-MCNC: 232 MG/DL (ref 70–99)
GLUCOSE URINE: 250 MG/DL
HCT VFR BLD CALC: 41.8 % (ref 37–47)
HEMOGLOBIN: 14.4 G/DL (ref 12–16)
KETONES, URINE: NEGATIVE MG/DL
LEUKOCYTE ESTERASE, URINE: ABNORMAL
LYMPHOCYTES ABSOLUTE: 2 K/UL (ref 1–4.8)
LYMPHOCYTES RELATIVE PERCENT: 19.6 %
Lab: ABNORMAL
MAGNESIUM: 2.3 MG/DL (ref 1.7–2.4)
MCH RBC QN AUTO: 32.3 PG (ref 27–31.3)
MCHC RBC AUTO-ENTMCNC: 34.4 % (ref 33–37)
MCV RBC AUTO: 93.9 FL (ref 82–100)
MONOCYTES ABSOLUTE: 0.4 K/UL (ref 0.2–0.8)
MONOCYTES RELATIVE PERCENT: 4.2 %
NEUTROPHILS ABSOLUTE: 7.6 K/UL (ref 1.4–6.5)
NEUTROPHILS RELATIVE PERCENT: 74.5 %
NITRITE, URINE: NEGATIVE
OPIATE SCREEN URINE: ABNORMAL
PDW BLD-RTO: 12.9 % (ref 11.5–14.5)
PH UA: 5.5 (ref 5–9)
PHENCYCLIDINE SCREEN URINE: ABNORMAL
PLATELET # BLD: 325 K/UL (ref 130–400)
POTASSIUM SERPL-SCNC: 3.6 MEQ/L (ref 3.4–4.9)
PROTEIN UA: NEGATIVE MG/DL
RBC # BLD: 4.45 M/UL (ref 4.2–5.4)
SODIUM BLD-SCNC: 141 MEQ/L (ref 135–144)
SPECIFIC GRAVITY UA: 1.01 (ref 1–1.03)
TOTAL CK: 86 U/L (ref 0–170)
TOTAL PROTEIN: 7.3 G/DL (ref 6.3–8)
TROPONIN: <0.01 NG/ML (ref 0–0.01)
TSH SERPL DL<=0.05 MIU/L-ACNC: 1.59 UIU/ML (ref 0.44–3.86)
UROBILINOGEN, URINE: 0.2 E.U./DL
WBC # BLD: 10.2 K/UL (ref 4.8–10.8)

## 2019-05-03 PROCEDURE — 84443 ASSAY THYROID STIM HORMONE: CPT

## 2019-05-03 PROCEDURE — 80053 COMPREHEN METABOLIC PANEL: CPT

## 2019-05-03 PROCEDURE — 81001 URINALYSIS AUTO W/SCOPE: CPT

## 2019-05-03 PROCEDURE — 82550 ASSAY OF CK (CPK): CPT

## 2019-05-03 PROCEDURE — 36415 COLL VENOUS BLD VENIPUNCTURE: CPT

## 2019-05-03 PROCEDURE — 83735 ASSAY OF MAGNESIUM: CPT

## 2019-05-03 PROCEDURE — G0480 DRUG TEST DEF 1-7 CLASSES: HCPCS

## 2019-05-03 PROCEDURE — 83036 HEMOGLOBIN GLYCOSYLATED A1C: CPT

## 2019-05-03 PROCEDURE — 84484 ASSAY OF TROPONIN QUANT: CPT

## 2019-05-03 PROCEDURE — 93005 ELECTROCARDIOGRAM TRACING: CPT

## 2019-05-03 PROCEDURE — 80307 DRUG TEST PRSMV CHEM ANLYZR: CPT

## 2019-05-03 PROCEDURE — 99285 EMERGENCY DEPT VISIT HI MDM: CPT

## 2019-05-03 PROCEDURE — 85025 COMPLETE CBC W/AUTO DIFF WBC: CPT

## 2019-05-03 ASSESSMENT — ENCOUNTER SYMPTOMS
VOMITING: 0
SORE THROAT: 0
DIARRHEA: 0
BACK PAIN: 0
ABDOMINAL PAIN: 0
NAUSEA: 0
COUGH: 0
SHORTNESS OF BREATH: 0

## 2019-05-03 NOTE — ED PROVIDER NOTES
ML[facility] 3W I  eMERGENCYdEPARTMENT eNCOUnter      Pt Name: Benson Silva  MRN: [id number]  [place] 1965  Date of evaluation: 5/3/2019  Provider:Carmelo Ramos DO    CHIEF COMPLAINT           ANDRÉS Silva is a 47 y.o. female per chart review has a h/o asthma, COPD, depression/anxiety presents to the ED with depression. Pt notes gradual onset, moderate, worsening depression x 2 months. Pt denies SI/HI, AVH. Pt notes she was recently started on prozac however without any relief. ROS  Review of Systems   Constitutional: Negative for activity change, chills and fever. HENT: Negative for ear pain and sore throat. Eyes: Negative for visual disturbance. Respiratory: Negative for cough and shortness of breath. Cardiovascular: Negative for chest pain, palpitations and leg swelling. Gastrointestinal: Negative for abdominal pain, diarrhea, nausea and vomiting. Genitourinary: Negative for dysuria. Musculoskeletal: Negative for back pain. Skin: Negative for rash. Neurological: Negative for dizziness and weakness. Psychiatric/Behavioral: Positive for behavioral problems, decreased concentration and dysphoric mood. Except as noted above the remainder of the review of systems was reviewed and negative.        PAST MEDICAL HISTORY     Past Medical History:   Diagnosis Date    Asthma     Chronic back pain greater than 3 months duration 2012    Dr Dixie Payne COPD (chronic obstructive pulmonary disease) [facility]) 2017    Dr Cobian Every 2010    Dr Chance [facility]), hospital admissions, counseling Souleymane August    Family history of malignant neoplasm of breast     H/O vitamin D deficiency     Invasive ductal carcinoma of left breast ([facility] [address] 05/2018    T1N0M0 ERPR+ her2-, radiation, no chemo, Dr Kit Swain       Past Surgical History:   Procedure Laterality Date    BREAST CYST EXCISION Right 2000    benign    BREAST LUMPECTOMY      and SLN evaluation    DILATION AND CURETTAGE OF UTERUS  2012    due to [address]      right index finger / exc tumor mass / at age 10    ND BX BREAST W DEVICE 1ST LESION ULTRASOUND GUIDE Left 5/11/2018    US guided left breast biopsy, excisional    ND REMOVAL OF BREAST LESION Left 5/29/2018    RE EXCISION BREAST CANCER SITE, SENTINEL NODE BIOPSY    TONSILLECTOMY      at age 12         [address]       Current Discharge Medication List      CONTINUE these medications which have NOT CHANGED    Details   FLUoxetine (PROZAC) 20 MG capsule Take 1 capsule by mouth daily  Qty: 90 capsule, Refills: 2      ondansetron (ZOFRAN ODT) 4 MG disintegrating tablet Take 1-2 tablets by mouth every 12 hours as needed for Nausea May Sub regular tablet (non-ODT) if insurance does not cover ODT. Qty: 12 tablet, Refills: 0      lidocaine (LIDODERM) 5 %       azelastine (ASTELIN) 0.1 % nasal spray 2 sprays by Nasal route 2 times daily 2 Spray in each nostril  Qty: 1 Bottle, Refills: 0      SYMBICORT 80-4.5 MCG/ACT AERO inhale 2 puffs by mouth twice a day  Qty: 10.2 g, Refills: 5    Associated Diagnoses: Chronic obstructive pulmonary disease with acute exacerbation (HCC)      tiZANidine (ZANAFLEX) 4 MG tablet take 1 tablet by mouth at bedtime  Refills: 0      albuterol (PROVENTIL) (2.5 MG/3ML) 0.083% nebulizer solution Take 3 mLs by nebulization every 6 hours as needed for Wheezing  Qty: 120 each, Refills: 3      albuterol sulfate HFA (PROAIR HFA) 108 (90 Base) MCG/ACT inhaler Inhale 2 puffs into the lungs every 6 hours as needed for Wheezing  Qty: 1 Inhaler, Refills: 3      Multiple Vitamins-Minerals (MULTIVITAMIN PO) Take by mouth      traMADol (ULTRAM) 50 MG tablet Take 50 mg by mouth every 6 hours as needed. Refills: 0             ALLERGIES     Tamoxifen; Morphine; Abilify [aripiprazole]; Dye [gadolinium derivatives];  Effexor [venlafaxine]; and Seasonal    FAMILY HISTORY       Family History   Problem Relation Age of Onset    Heart Failure Mother         dec 80    Osteoarthritis Mother     Cancer Mother 61        breast     Breast Cancer Mother     Heart Attack Father         dec 58    Hypertension Father     High Cholesterol Father     Stroke Sister     No Known Problems Son           SOCIAL HISTORY       Social History     Socioeconomic History    Marital status: Single     Spouse name: None    Number of children: 2    Years of education: None    Highest education level: None   Occupational History    Occupation: , now on [address] resource strain: None    Food insecurity:     Worry: None     Inability: None    Transportation needs:     Medical: None     Non-medical: None   Tobacco Use    Smoking status: Former Smoker     Packs/day: 1.00     Years: 20.00     Pack years: 20.00     Types: Cigarettes     Last attempt to quit: 2017     Years since quittin.4    Smokeless tobacco: Never Used   Substance and Sexual Activity    Alcohol use:  Yes     Alcohol/week: 0.0 oz     Comment: social    Drug use: No    Sexual activity: Yes   Lifestyle    Physical activity:     Days per week: None     Minutes per session: None    Stress: None   Relationships    Social connections:     Talks on phone: None     Gets together: None     Attends Religion service: None     Active member of club or organization: None     Attends meetings of clubs or organizations: None     Relationship status: None    Intimate partner violence:     Fear of current or ex partner: None     Emotionally abused: None     Physically abused: None     Forced sexual activity: None   Other Topics Concern    None   Social History Narrative    Born in [place], one of 3 siblings, 2 half sisters (does not talk to them since )    Single, , 2 sons, in [address], does some babysitting    Lives in a mobil home in [place] alone    Worked as a  x 15 years Religion, attends church On Medicare for depression x 2011         PHYSICAL EXAM       ED Triage Vitals [05/03/19 1819]   BP Temp Temp Source Pulse Resp SpO2 Height Weight   135/85 97.3 °F (36.3 °C) Oral 103 18 97 % 5' 4\" (1.626 m) 160 lb (72.6 kg)       Physical Exam   Constitutional: She is oriented to person, place, and time. She appears well-developed. HENT:   Head: Normocephalic. Right Ear: External ear normal.   Left Ear: External ear normal.   Mouth/Throat: Oropharynx is clear and moist.   Eyes: Pupils are equal, round, and reactive to light. Conjunctivae are normal.   Neck: Normal range of motion. Neck supple. Cardiovascular: Normal rate, regular rhythm and normal heart sounds. Pulmonary/Chest: Effort normal and breath sounds normal.   Abdominal: Soft. Bowel sounds are normal. She exhibits no distension. There is no tenderness. Musculoskeletal: Normal range of motion. Neurological: She is alert and oriented to person, place, and time. Skin: Skin is warm and dry. Psychiatric:   Flat affect. No flight of ideas, circumferential thoughts, pressured speech. Nursing note and vitals reviewed. MDM  48 yo female presents to the ED with depression. Pt is afebrile, hemodynamically stable. EKG shows NSR with HR 84, normal axis, normal intervals, no ST changes. Labs remarkable for glucose 232. UA shows possible UTI. Tox positive for THC. Pt is medically clear for psych evaluation. Urinalysis is heavily contaminated. Recommendation is for repeat of the urinalysis with a clean catch urine. The Urinalysis shows no leukocyte esterase. Patient is medically cleared for psychiatric services. FINAL IMPRESSION      1.  Current severe episode of major depressive disorder without psychotic features without prior episode [facility])          DISPOSITION/PLAN   DISPOSITION          DISCHARGE MEDICATIONS:  [unfilled]         Irvin Ramos, (electronically signed)  Attending Emergency Physician            Kannan Mejia,   05/07/19 1007

## 2019-05-04 PROBLEM — F32.9 CURRENT EPISODE OF MAJOR DEPRESSIVE DISORDER WITHOUT PRIOR EPISODE: Status: ACTIVE | Noted: 2019-05-04

## 2019-05-04 LAB
BACTERIA: ABNORMAL /HPF
BILIRUBIN URINE: NEGATIVE
BLOOD, URINE: NEGATIVE
CLARITY: CLEAR
COLOR: YELLOW
EPITHELIAL CELLS, UA: ABNORMAL /HPF (ref 0–5)
GLUCOSE URINE: NEGATIVE MG/DL
HYALINE CASTS: ABNORMAL /HPF (ref 0–5)
KETONES, URINE: NEGATIVE MG/DL
LEUKOCYTE ESTERASE, URINE: NEGATIVE
NITRITE, URINE: NEGATIVE
PH UA: 5.5 (ref 5–9)
PROTEIN UA: NEGATIVE MG/DL
SPECIFIC GRAVITY UA: 1.02 (ref 1–1.03)
URINE REFLEX TO CULTURE: NORMAL
UROBILINOGEN, URINE: 1 E.U./DL
WBC UA: ABNORMAL /HPF (ref 0–5)

## 2019-05-04 PROCEDURE — 81003 URINALYSIS AUTO W/O SCOPE: CPT

## 2019-05-04 PROCEDURE — 1240000000 HC EMOTIONAL WELLNESS R&B

## 2019-05-04 PROCEDURE — 6370000000 HC RX 637 (ALT 250 FOR IP): Performed by: PHYSICIAN ASSISTANT

## 2019-05-04 PROCEDURE — 6370000000 HC RX 637 (ALT 250 FOR IP): Performed by: PSYCHIATRY & NEUROLOGY

## 2019-05-04 RX ORDER — FLUOXETINE HYDROCHLORIDE 20 MG/1
20 CAPSULE ORAL DAILY
Status: DISCONTINUED | OUTPATIENT
Start: 2019-05-04 | End: 2019-05-07

## 2019-05-04 RX ORDER — TRAMADOL HYDROCHLORIDE 50 MG/1
50 TABLET ORAL ONCE
Status: COMPLETED | OUTPATIENT
Start: 2019-05-04 | End: 2019-05-04

## 2019-05-04 RX ORDER — ACETAMINOPHEN 500 MG
1000 TABLET ORAL ONCE
Status: DISCONTINUED | OUTPATIENT
Start: 2019-05-04 | End: 2019-05-04

## 2019-05-04 RX ORDER — DICYCLOMINE HCL 20 MG
20 TABLET ORAL 4 TIMES DAILY
Status: DISCONTINUED | OUTPATIENT
Start: 2019-05-04 | End: 2019-05-10 | Stop reason: HOSPADM

## 2019-05-04 RX ORDER — QUETIAPINE FUMARATE 25 MG/1
25 TABLET, FILM COATED ORAL NIGHTLY
Status: DISCONTINUED | OUTPATIENT
Start: 2019-05-04 | End: 2019-05-05

## 2019-05-04 RX ORDER — HYDROXYZINE PAMOATE 25 MG/1
50 CAPSULE ORAL EVERY 6 HOURS PRN
Status: DISCONTINUED | OUTPATIENT
Start: 2019-05-04 | End: 2019-05-06

## 2019-05-04 RX ORDER — DIPHENHYDRAMINE HCL 50 MG
50 CAPSULE ORAL ONCE
Status: COMPLETED | OUTPATIENT
Start: 2019-05-04 | End: 2019-05-04

## 2019-05-04 RX ORDER — ACETAMINOPHEN 325 MG/1
650 TABLET ORAL EVERY 4 HOURS PRN
Status: DISCONTINUED | OUTPATIENT
Start: 2019-05-04 | End: 2019-05-10 | Stop reason: HOSPADM

## 2019-05-04 RX ORDER — DICYCLOMINE HYDROCHLORIDE 10 MG/ML
20 INJECTION INTRAMUSCULAR 4 TIMES DAILY
Status: DISCONTINUED | OUTPATIENT
Start: 2019-05-04 | End: 2019-05-10 | Stop reason: HOSPADM

## 2019-05-04 RX ORDER — HALOPERIDOL 5 MG/ML
5 INJECTION INTRAMUSCULAR EVERY 6 HOURS PRN
Status: DISCONTINUED | OUTPATIENT
Start: 2019-05-04 | End: 2019-05-10 | Stop reason: HOSPADM

## 2019-05-04 RX ORDER — ONDANSETRON 4 MG/1
4 TABLET, ORALLY DISINTEGRATING ORAL EVERY 8 HOURS PRN
Status: DISCONTINUED | OUTPATIENT
Start: 2019-05-04 | End: 2019-05-10 | Stop reason: HOSPADM

## 2019-05-04 RX ORDER — HALOPERIDOL 5 MG
5 TABLET ORAL EVERY 6 HOURS PRN
Status: DISCONTINUED | OUTPATIENT
Start: 2019-05-04 | End: 2019-05-10 | Stop reason: HOSPADM

## 2019-05-04 RX ORDER — ONDANSETRON 4 MG/1
4 TABLET, ORALLY DISINTEGRATING ORAL ONCE
Status: COMPLETED | OUTPATIENT
Start: 2019-05-04 | End: 2019-05-04

## 2019-05-04 RX ORDER — TRAZODONE HYDROCHLORIDE 50 MG/1
50 TABLET ORAL NIGHTLY PRN
Status: DISCONTINUED | OUTPATIENT
Start: 2019-05-04 | End: 2019-05-10 | Stop reason: HOSPADM

## 2019-05-04 RX ORDER — HYDROXYZINE PAMOATE 25 MG/1
25 CAPSULE ORAL ONCE
Status: DISCONTINUED | OUTPATIENT
Start: 2019-05-04 | End: 2019-05-04

## 2019-05-04 RX ADMIN — TRAMADOL HYDROCHLORIDE 50 MG: 50 TABLET, FILM COATED ORAL at 09:16

## 2019-05-04 RX ADMIN — ONDANSETRON 4 MG: 4 TABLET, ORALLY DISINTEGRATING ORAL at 22:24

## 2019-05-04 RX ADMIN — DIPHENHYDRAMINE HYDROCHLORIDE 50 MG: 50 CAPSULE ORAL at 09:16

## 2019-05-04 RX ADMIN — TRAMADOL HYDROCHLORIDE 50 MG: 50 TABLET, FILM COATED ORAL at 10:02

## 2019-05-04 RX ADMIN — FLUOXETINE HYDROCHLORIDE 20 MG: 20 CAPSULE ORAL at 09:16

## 2019-05-04 RX ADMIN — QUETIAPINE FUMARATE 25 MG: 25 TABLET ORAL at 21:26

## 2019-05-04 RX ADMIN — ONDANSETRON 4 MG: 4 TABLET, ORALLY DISINTEGRATING ORAL at 09:16

## 2019-05-04 ASSESSMENT — SLEEP AND FATIGUE QUESTIONNAIRES
DIFFICULTY ARISING: NO
SLEEP PATTERN: DIFFICULTY FALLING ASLEEP;INSOMNIA;DISTURBED/INTERRUPTED SLEEP
DIFFICULTY FALLING ASLEEP: NO
DIFFICULTY STAYING ASLEEP: YES
RESTFUL SLEEP: NO
AVERAGE NUMBER OF SLEEP HOURS: 4
DO YOU HAVE DIFFICULTY SLEEPING: YES
DO YOU USE A SLEEP AID: YES

## 2019-05-04 ASSESSMENT — PAIN SCALES - GENERAL
PAINLEVEL_OUTOF10: 7
PAINLEVEL_OUTOF10: 7

## 2019-05-04 ASSESSMENT — PATIENT HEALTH QUESTIONNAIRE - PHQ9: SUM OF ALL RESPONSES TO PHQ QUESTIONS 1-9: 16

## 2019-05-04 NOTE — ED NOTES
Awaiting disposition per report from [facility]. Talking on phone with no acute distress noted.      Zaria You RN  05/04/19 [id number]

## 2019-05-04 NOTE — ED NOTES
Left a  with her son Raul Youssef at number listed in the chart and asked him to call information regarding his mother left the [address] [facility] number to call the [phone] back     Edwin Hernandez RN  05/04/19 2011

## 2019-05-04 NOTE — ED NOTES
Security is here with the pt.and gave the chart to the security  Talked with pt whom is upset explained the reason for the admit and the need for a pink sheet. Pt is aware of admit to [facility] she thought she was going to be D/C home. Reviewed recent comment on her wanting to join her parents and her safety at home issues that she needs to see the psychiatrist and would have the opportunity to talk with a psychiatrist and possibly start medications  Discussed with her left breast, the breast was not removed but most of breast was removed to get the tumor removed, she received chemo therapy and is now in remission. Pt wanted to wait until her son came back to the unit but explained he could visit upstairs and would make sure he received the information on her admit. Pt is reluctant and aware of the pink sheet. Explained to the pt the pink sheet was a 72 hour hold but could be determined by her and psychiatrist how long she would be here on [facility] does not have to be the 72 hours. Pt accepted the information given to her on the admit, she wants D/C home like she thought she would be but that did not happen  Explained several times the reason and need for pink sheet as she is wanting to be D/C and the doctor feels she needs admitted as not wanting to sign herself into the unit and wants the D/C the need for the pink sheet.       Bakari Olivas RN  05/04/19 1615

## 2019-05-04 NOTE — ED NOTES
Yudith Em RN a report on the pt for her admit to [facility] reviewed DX and insurance along with labs, medications home, reviewed with pharmacy and the hospitalitis will need to review her medical medications  Reviewed her first urinalysis and the 2nd one with a clean catch obtained and came back normal  Per Dr. Mimi Bishop the first urinalysis was contaminated.   Pt reviewed labs and vital signs  Room 386 for her admit     Jenelle Andrade RN  05/04/19 2045

## 2019-05-04 NOTE — ED NOTES
Pt's ultram was given as ordered and pt was cooperative with taking the medication no problems noted or expressed     Isabelle Mackay RN  05/04/19 7344

## 2019-05-04 NOTE — ED NOTES
Put orders in on the pt for [facility] and called to give a report and obtain a bed for the pt.   Talked with Cristiane Marino the unit secretary whom will have a nurse call the [facility] ER unit for pt's admit to [facility]     Boyd Holden RN  05/04/19 6869

## 2019-05-04 NOTE — ED NOTES
Pt is in the bathroom trying to void  Gave the pt the wipes to clean each side of her labia and in between before voiding and she accepted the information and demonstration on wiping her genital area for a clean catch urine ordered.      Jenelle Andrade RN  05/04/19 3537

## 2019-05-04 NOTE — ED NOTES
Breakfast tray placed @ bedside. Resting with eyes closed & no acute distress noted.      Kayla Shin RN  05/04/19 2090

## 2019-05-04 NOTE — ED NOTES
Called pharmacy the pharmacy states that they are out of stock of Vistaril will need to change the order     Boyd Holden RN  05/04/19 7631

## 2019-05-04 NOTE — ED NOTES
Pt is eating breakfast visiting with her son at bedside, quiet and cooperative awaiting medications from pharmacy.      Dee Kinney RN  05/04/19 6950

## 2019-05-04 NOTE — ED NOTES
Call to patient's son Meagan Valente, no answer. Do not feel assessment can be complete in full without collateral information. Cristal Thakkar, FANNY  05/04/19 6659

## 2019-05-04 NOTE — ED NOTES
Called security for pt's belongings and her belongings which the pt has no belongings per security will discuss with the pt her admit and pink sheet     Rosamaria Brown RN  05/04/19 [phone]

## 2019-05-04 NOTE — ED NOTES
Care resumed, Pt resting in bed, 0 c/o, 0 distress, 0 pain, skin w/d/pink, pulses palp, 0 si/hi, calm, cooperative, 0 problems.      Gil Ganser, RN  05/03/19 4176

## 2019-05-04 NOTE — ED NOTES
Patient gave this RN permission to contact her son Kolby Sandoval for collateral information     Dionne Cisse.  Kingston Venegas RN  05/04/19 9670

## 2019-05-04 NOTE — ED NOTES
Pt is on the phone currently will give her medication as soon as located her Vistaril none is in the machine in the [facility] office.   Pt is quiet and cooperative with no problems noted or expressed     Rosamaria Brown RN  05/04/19 4882

## 2019-05-04 NOTE — ED NOTES
Discussed pt's medications with her and what she is currently taking  Advised can not take her own medications at the ER and she could have a breathing treatment. She refused a breathing treatment. Will consult with the doctor for her medications.      Boyd Holden RN  05/04/19 5791

## 2019-05-04 NOTE — ED NOTES
Discussed pt's admit/D/C to home with Dr. Carlitos Angelo, reviewed all of her medications with the doctor and medications she received in the ER, reviewed her breast cancer in left breast that is in remission currently after chemo therapy she had  Her son Eduar Munoz stated with the son in the office while talking with the doctor that yesterday his mother made a comment that she just wanted to join her parents in heaven  Per lala her son he does have reservations regarding her safety in coming home not sure if she would be safe or not  Pt's safety was asked about while in the office with her son and the doctor  Dr. Estelle Way pt admitted to [facility] on a pink sheet to [facility].  Reviewed her insurance with the doctor.      Amina Oakes RN  05/04/19 7277

## 2019-05-04 NOTE — ED NOTES
Bed request was sent to the [facility] on the pt for room [address] [place] [address]  05/04/19 2410

## 2019-05-04 NOTE — ED NOTES
Pt was ordered her Prozac, Tramdol, no tylenols as she refused them. Benadryl she will accept and Zofran was ordered  Will retrieve pt's medications and give to the pt.      Raissa Gerber RN  05/04/19 7053

## 2019-05-04 NOTE — FLOWSHEET NOTE
Admission completed. Pt has been oriented to the unit. She appears depressed/sad and is tearful at times. Rates depression 5/10 and anxiety 8/10. Pt denies suicidal ideations and denies telling family that she wanted to join her parents in heaven \"I just said I missed them\". Denies AVH. Pt reports very poor sleep and increased anxiety over last couple weeks. Anxiety related to recent health problems and family conflict. Pt states she only sleeps 4 hours a night. Calm during assessment.

## 2019-05-04 NOTE — ED NOTES
Pt left with her son whom brought belongings into the ER for her, explained would not be able to keep anything with hoods and or laces, she gave back to her son a few items from her bag.    Pt left with security and -ER for her [facility] admit she is on a pink sheet     Parish Haines RN  05/04/19 8561

## 2019-05-04 NOTE — ED NOTES
Pt into  gown, pt's son taking all belongings home, pt a&ox4, skin w/d/pink, pulses palp, 0 pain, 0 c/o, 0 distress, 0 si/hi, calm, cooperative, will monitor.      Rolanda Randolph, RN  05/04/19 011

## 2019-05-04 NOTE — ED NOTES
Discussed with pt and her son which was in the [facility] office and she gave permission to review her labs while he was present in the room,  office. Talked with Dr. Ramiro Shook regarding her urinalysis  Dr. Cee Ramos reviewed her chart and asked for another urinalysis to be completed with a clean catch. Advised pt of all her labs and called the doctor while she was in the office. Explained to the pt the need for urine and would be calling the doctor for her disposition  Pt wants to be D/c home explained the possibility of a psych admit and or D/C home.      Anirudh Topete, RN  05/04/19 5785

## 2019-05-04 NOTE — ED NOTES
Called the  for security for pt's admit to [facility] need an escort. Pt's belongings were given to the son which he took home with him.      Najma Key RN  05/04/19 4854

## 2019-05-04 NOTE — ED NOTES
Pt was given her lunch at bedside she is quiet and cooperative with no problems noted or expressed  Her son is at bedside     Isabelle Mackay RN  05/04/19 Jean Carlos Wren RN  05/04/19 0646

## 2019-05-05 LAB — HBA1C MFR BLD: 5.5 % (ref 4.8–5.9)

## 2019-05-05 PROCEDURE — 94760 N-INVAS EAR/PLS OXIMETRY 1: CPT

## 2019-05-05 PROCEDURE — 6370000000 HC RX 637 (ALT 250 FOR IP): Performed by: NURSE PRACTITIONER

## 2019-05-05 PROCEDURE — 94664 DEMO&/EVAL PT USE INHALER: CPT

## 2019-05-05 PROCEDURE — 1240000000 HC EMOTIONAL WELLNESS R&B

## 2019-05-05 PROCEDURE — 6370000000 HC RX 637 (ALT 250 FOR IP): Performed by: PSYCHIATRY & NEUROLOGY

## 2019-05-05 PROCEDURE — 6370000000 HC RX 637 (ALT 250 FOR IP): Performed by: PHYSICIAN ASSISTANT

## 2019-05-05 PROCEDURE — 94640 AIRWAY INHALATION TREATMENT: CPT

## 2019-05-05 RX ORDER — ALBUTEROL SULFATE 90 UG/1
2 AEROSOL, METERED RESPIRATORY (INHALATION) EVERY 6 HOURS PRN
Status: DISCONTINUED | OUTPATIENT
Start: 2019-05-05 | End: 2019-05-10 | Stop reason: HOSPADM

## 2019-05-05 RX ORDER — ALBUTEROL SULFATE 2.5 MG/3ML
2.5 SOLUTION RESPIRATORY (INHALATION) EVERY 6 HOURS PRN
Status: DISCONTINUED | OUTPATIENT
Start: 2019-05-05 | End: 2019-05-10 | Stop reason: HOSPADM

## 2019-05-05 RX ORDER — TRAMADOL HYDROCHLORIDE 50 MG/1
50 TABLET ORAL EVERY 6 HOURS PRN
Status: DISCONTINUED | OUTPATIENT
Start: 2019-05-05 | End: 2019-05-10 | Stop reason: HOSPADM

## 2019-05-05 RX ORDER — M-VIT,TX,IRON,MINS/CALC/FOLIC 27MG-0.4MG
1 TABLET ORAL DAILY
Status: DISCONTINUED | OUTPATIENT
Start: 2019-05-05 | End: 2019-05-10 | Stop reason: HOSPADM

## 2019-05-05 RX ORDER — LIDOCAINE 4 G/G
1 PATCH TOPICAL DAILY
Status: DISCONTINUED | OUTPATIENT
Start: 2019-05-05 | End: 2019-05-10 | Stop reason: HOSPADM

## 2019-05-05 RX ORDER — QUETIAPINE FUMARATE 50 MG/1
50 TABLET, FILM COATED ORAL NIGHTLY
Status: DISCONTINUED | OUTPATIENT
Start: 2019-05-05 | End: 2019-05-10 | Stop reason: HOSPADM

## 2019-05-05 RX ADMIN — DICYCLOMINE HYDROCHLORIDE 20 MG: 20 TABLET ORAL at 09:16

## 2019-05-05 RX ADMIN — QUETIAPINE FUMARATE 50 MG: 50 TABLET ORAL at 20:34

## 2019-05-05 RX ADMIN — TRAMADOL HYDROCHLORIDE 50 MG: 50 TABLET, FILM COATED ORAL at 16:17

## 2019-05-05 RX ADMIN — DICYCLOMINE HYDROCHLORIDE 20 MG: 20 TABLET ORAL at 17:05

## 2019-05-05 RX ADMIN — TRAMADOL HYDROCHLORIDE 50 MG: 50 TABLET, FILM COATED ORAL at 09:14

## 2019-05-05 RX ADMIN — MULTIPLE VITAMINS W/ MINERALS TAB 1 TABLET: TAB at 13:35

## 2019-05-05 RX ADMIN — MOMETASONE FUROATE AND FORMOTEROL FUMARATE DIHYDRATE 2 PUFF: 100; 5 AEROSOL RESPIRATORY (INHALATION) at 16:47

## 2019-05-05 RX ADMIN — FLUOXETINE HYDROCHLORIDE 20 MG: 20 CAPSULE ORAL at 08:38

## 2019-05-05 RX ADMIN — DICYCLOMINE HYDROCHLORIDE 20 MG: 20 TABLET ORAL at 20:34

## 2019-05-05 RX ADMIN — DICYCLOMINE HYDROCHLORIDE 20 MG: 20 TABLET ORAL at 14:22

## 2019-05-05 ASSESSMENT — PAIN SCALES - GENERAL
PAINLEVEL_OUTOF10: 5
PAINLEVEL_OUTOF10: 6
PAINLEVEL_OUTOF10: 7

## 2019-05-05 NOTE — PROGRESS NOTES
Ct out briefly on unit and continues to report body aches 6/10 and has received Ultram as ordered. Was hoping to get Zanaflex to help with stiffness but it was not ordered at this time. Shares having anxiety as the main issue with unexplained fears. No thoughts of self harm, poor sleep.

## 2019-05-05 NOTE — H&P
[address] [facility] MEDICINE    HISTORY AND PHYSICAL EXAM    PATIENT NAME:  Jose Angel Berrios    MRN:  [id number]  SERVICE DATE:  5/5/2019   SERVICE TIME:  10:54 AM    Primary Care Physician: Darian Dawkins MD         SUBJECTIVE  CHIEF COMPLAINT:  Medical clear for inpatient psychiatry admission. Consult for medical H/P encounter. HPI:  This is a 47 y.o. female  With PMH asthma, COPD, invasive ductal carcinoma of left breast who presents with c/o worsening , depression, anxiety and difficulty sleeping @ night. Pt also states she has left breast cancer, s/p lumpectomy and radiation treatment. Pt also states she is on oral chemo medication but stopped taking it since March 19 because it made her sick. Pt states she had called Dr Necia Bamberger office for appointment but appointment date is till far out and she doesn't think she will f/u up because she has tried 2 different chemo meds in the past and its messing her up and she is not able to function well. I explained to pt the risk for abruptly stopping her chemo drugs including relapse of the cancer for which pt states she knows but  doesn't want to take any more cancer medication . I informed pt that I will consult oncologist Dr Ellis Hargrove to discuss with her other treatment options for which pt declines stating that she is no longer interested.       PAST MEDICAL HISTORY:    Past Medical History:   Diagnosis Date    Asthma     Chronic back pain greater than 3 months duration 2012    Dr Felipe Mcmanus COPD (chronic obstructive pulmonary disease) [facility]) 2017    Dr Mary Hu 2010    Dr Mullen [facility]), hospital admissions, counseling Bhavin Hearn    Family history of malignant neoplasm of breast     H/O vitamin D deficiency     Invasive ductal carcinoma of left breast ([facility] [address] 05/2018    [id number] ERPR+ her2-, radiation, no chemo, Dr Tammie Anne     PAST SURGICAL HISTORY:    Past Surgical History:   Procedure Laterality Date    BREAST CYST EXCISION club or organization: Not on file     Attends meetings of clubs or organizations: Not on file     Relationship status: Not on file    Intimate partner violence:     Fear of current or ex partner: Not on file     Emotionally abused: Not on file     Physically abused: Not on file     Forced sexual activity: Not on file   Other Topics Concern    Not on file   Social History Narrative    Born in [place], one of 3 siblings, 2 half sisters (does not talk to them since 2010)    Single, , 2 sons, in [place] and [facility], does some babysitting    Lives in a mobil home in [place] alone    Worked as a  x 15 years    [place] [address], attends [employer]     On Medicare for depression x 2011     MEDICATIONS:    Current Facility-Administered Medications   Medication Dose Route Frequency Provider Last Rate Last Dose    traMADol (ULTRAM) tablet 50 mg  50 mg Oral Q6H PRN Brandon Marks MD   50 mg at 05/05/19 0914    FLUoxetine (PROZAC) capsule 20 mg  20 mg Oral Daily Brandon Marks MD   20 mg at 05/05/19 0838    acetaminophen (TYLENOL) tablet 650 mg  650 mg Oral Q4H PRN Brandon Marks MD        magnesium hydroxide (MILK OF MAGNESIA) 400 MG/5ML suspension 30 mL  30 mL Oral Daily PRN Brandon Marks MD        haloperidol (HALDOL) tablet 5 mg  5 mg Oral Q6H PRN Brandon Marks MD        Or    haloperidol lactate (HALDOL) injection 5 mg  5 mg Intramuscular Q6H PRN Brandon Marks MD        traZODone (DESYREL) tablet 50 mg  50 mg Oral Nightly VIANCA Marks MD        hydrOXYzine (VISTARIL) capsule 50 mg  50 mg Oral Q6H PRN Brandon Marks MD        QUEtiapine (SEROQUEL) tablet 25 mg  25 mg Oral Nightly Brandon Marks MD   25 mg at 05/04/19 2126    ondansetron (ZOFRAN-ODT) disintegrating tablet 4 mg  4 mg Oral Q8H PRN Olivia Joseph PA-C   4 mg at 05/04/19 2224    dicyclomine (BENTYL) injection 20 mg  20 mg Intramuscular 4x Daily Awanda Panda, PA-C        Or    dicyclomine (BENTYL) tablet 20 mg  20 mg Oral 4x Daily Awanda Panda, PA-C   20 mg at 05/05/19 8178       ALLERGIES: Tamoxifen; Morphine; Abilify [aripiprazole]; Dye [gadolinium derivatives]; Effexor [venlafaxine]; and Seasonal    REVIEW OF SYSTEM:   ROS as noted in HPI, 12 point ROS reviewed and otherwise negative. OBJECTIVE  PHYSICAL EXAM: BP 94/61   Pulse 80   Temp 98 °F (36.7 °C) (Oral)   Resp 18   Ht 5' 4\" (1.626 m)   Wt 160 lb (72.6 kg)   LMP 05/08/2014   SpO2 96%   BMI 27.46 kg/m²   CONSTITUTIONAL:  awake, alert, cooperative, no apparent distress, and appears stated age  EYES:  Lids and lashes normal, pupils equal, round and reactive to light, extra ocular muscles intact, sclera clear, conjunctiva normal  ENT:  Normocephalic, without obvious abnormality, atraumatic, sinuses nontender on palpation, external ears without lesions, oral pharynx with moist mucus membranes, tonsils without erythema or exudates, gums normal and good dentition. NECK:  Supple, symmetrical, trachea midline, no adenopathy, thyroid symmetric, not enlarged and no tenderness, skin normal  LUNGS:  No increased work of breathing, good air exchange, clear to auscultation bilaterally, no crackles or wheezing  CARDIOVASCULAR:  Normal apical impulse, regular rate and rhythm, normal S1 and S2, no S3 or S4, and no murmur noted  ABDOMEN:  No scars, normal bowel sounds, soft, non-distended, non-tender, no masses palpated, no hepatosplenomegally  MUSCULOSKELETAL:  There is no redness, warmth, or swelling of the joints. Full range of motion noted. Motor strength is 5 out of 5 all extremities bilaterally. Tone is normal.  NEUROLOGIC:  Awake, alert, oriented to name, place and time. Cranial nerves II-XII are grossly intact. Motor is 5 out of 5 bilaterally. Cerebellar finger to nose, heel to shin intact. Sensory is intact.   Babinski down going, Romberg negative, and gait is normal.  SKIN:  no

## 2019-05-05 NOTE — H&P
Problems Son      Social History     Social History Narrative    Born in [place] [place], one of 3 siblings, 2 half sisters (does not talk to them since 2010)    Single, , 2 sons, in [place] and [facility], does some babysitting    Lives in a mobil home in [place] alone    Worked as a  x 15 years    [address], attends [employer]     On Medicare for depression x 2011   family psyche hx: mother with depression  Previous psychiatric hx: admitted to [facility] in 2011 for severe depression  Mse: pleasant  Very tearful  Mood sad  Affect exaggerated  Speech slow rate  Slightly disorganized  Paranoid about people  Denies voices  Looks anxious and speaks vaguely  Some passive suicidal thoughts  Denies homicidal thoughts  Insight and judgement fair  Distracted  Oriented to self place and time  Dx:  MDD with psychotic features  continue prozac add seroquel at bedtime  Blood sugar in the high 200s  Collateral information  hospitalist for medical clearance. Brandon Marks

## 2019-05-05 NOTE — CARE COORDINATION
5/5/19 @1415 -  approached patient during visiting hours. Patient wanted to enjoy her visit and refused assessment at that time. Patient stated she would completed at a later time.  As a result,  assessment was deferred     Electronically signed by SUSHILA Carver on 5/5/2019 at 2:17 PM

## 2019-05-05 NOTE — PROGRESS NOTES
[facility] Respiratory Therapy Evaluation   Current Order:  PRN    Home Regimen: PRN   Ordering Physician: [place] COGNITIVE DISORDERS  Re-evaluation Date: n/a    Diagnosis: PSYCH  Patient Status: Stable / Unstable + Physician notified    The following MDI Criteria must be met in order to convert aerosol to MDI with spacer. If unable to meet, MDI will be converted to aerosol:  []  Patient able to demonstrate the ability to use MDI effectively  []  Patient alert and cooperative  []  Patient able to take deep breath with 5-10 second hold  []  Medication(s) available in this delivery method   []  Peak flow greater than or equal to 200 ml/min            Current Order Substituted To  (same drug, same frequency)   Aerosol to MDI [] Albuterol Sulfate 0.083% unit dose by aerosol Albuterol Sulfate MDI 2 puffs by inhalation with spacer    [] Levalbuterol 1.25 mg unit dose by aerosol Levalbuterol MDI 2 puffs by inhalation with spacer    [] Levalbuterol 0.63 mg unit dose by aerosol Levalbuterol MDI 2 puffs by inhalation with spacer    [] Ipratropium Bromide 0.02% unit dose by aerosol Ipratropium Bromide MDI 2 puffs by inhalation with spacer    [] Duoneb (Ipratropium + Albuterol) unit dose by aerosol Ipratropium MDI + Albuterol MDI 2 puffs by inhalation w/spacer   MDI to Aerosol [] Albuterol Sulfate MDI Albuterol Sulfate 0.083% unit dose by aerosol    [] Levalbuterol MDI 2 puffs by inhalation Levalbuterol 1.25 mg unit dose by aerosol    [] Ipratropium Bromide MDI by inhalation Ipratropium Bromide 0.02% unit dose by aerosol    [] Combivent (Ipratropium + Albuterol) MDI by inhalation Duoneb (Ipratropium + Albuterol) unit dose by aerosol   Treatment Assessment [Frequency/Schedule]:  Change frequency to: ______________Q4prn____________________________________per Protocol, P&T, MEC      Points 0 1 2 3 4   Pulmonary Status  Non-Smoker  []   Smoking history   < 20 pack years  []   Smoking history  ?  20 pack years  [x]   Pulmonary Disorder  (acute or chronic)  []   Severe or Chronic w/ Exacerbation  []     Surgical Status No [x]   Surgeries     General []   Surgery Lower []   Abdominal Thoracic or []   Upper Abdominal Thoracic with  PulmonaryDisorder  []     Chest X-ray Clear/Not  Ordered     [x]  Chronic Changes  Results Pending  []  Infiltrates, atelectasis, pleural effusion, or edema  []  Infiltrates in more than one lobe []  Infiltrate + Atelectasis, &/or pleural effusion  []    Respiratory Pattern Regular,  RR = 12-20 [x]  Increased,  RR = 21-25 []  ANGULO, irregular,  or RR = 26-30 []  Decreased FEV1  or RR = 31-35 []  Severe SOB, use  of accessory muscles, or RR ? 35  []    Mental Status Alert, oriented,  Cooperative [x]  Confused but Follows commands []  Lethargic or unable to follow commands []  Obtunded  []  Comatose  []    Breath Sounds Clear to  auscultation  [x]  Decreased unilaterally or  in bases only []  Decreased  bilaterally  []  Crackles or intermittent wheezes []  Wheezes []    Cough Strong, Spontan., & nonproductive [x]  Strong,  spontaneous, &  productive []  Weak,  Nonproductive []  Weak, productive or  with wheezes []  No spontaneous  cough or may require suctioning []    Level of Activity Ambulatory [x]  Ambulatory w/ Assist  []  Non-ambulatory []  Paraplegic []  Quadriplegic []    Total  Score:____2___     Triage Score:___5_____      Tri       Triage:     1. (>20) Freq: Q3    2. (16-20) Freq: Q4   3. (11-15) Freq: QID & Albuterol Q2 PRN    4. (6-10) Freq: TID & Albuterol Q2 PRN    5. (0-5) Freq Q4prn

## 2019-05-05 NOTE — PROGRESS NOTES
Patient had a flat affect, was worrisome and anxious but cooperative. Patient stated she is depressed and stressed. Stressor are finances, family and her anxiety. Patient recently underwent treatment for breast cancer. Patient is upset because her son told her she can't babysit her grand kids and her other son just moved to [place]. Patient was feeling hopeless and helpless. She has poor sleep and ok appetite. She stated she has a lot of anxiety. She denies using drugs or drinking alcohol. She stop taking her meds. She stays to herself a lot. She enjoys watching TV, listening to music and being with her grand kids.  Electronically signed by Tariq Mena, [address] on 5/5/2019 at 2:19 PM

## 2019-05-05 NOTE — GROUP NOTE
Group Therapy Note    Date: May 4    Group Start Time: 2100  Group End Time: 2115  Group Topic: Wrap-Up    [facility]    Airam Wu        Group Therapy Note    Attendees: 7           Patient's Goal:  \"to get a good night's sleep\"    Notes:  Pt reports not sleeping well lately and is hopeful to get some restful sleep tonight. Status After Intervention:  Improved    Participation Level:  Active Listener and Interactive    Participation Quality: Appropriate, Attentive and Sharing      Speech:  normal      Thought Process/Content: Logical      Affective Functioning: Congruent      Mood: euthymic      Level of consciousness:  Alert, Oriented x4 and Attentive      Response to Learning: Able to verbalize current knowledge/experience and Progressing to goal      Endings: None Reported    Modes of Intervention: Support and Socialization      Discipline Responsible: Behavorial Health Tech      Signature:  Airam Wu

## 2019-05-05 NOTE — GROUP NOTE
Group Therapy Note    Date: May 5    Group Start Time: 1000  Group End Time: 1100  Group Topic: Psychoeducation    [facility]    Lorrie Campbell        Group Therapy Note           Patient's Goal:  \"To feel better\"    Notes:  Patient was quiet but she work fairly and independently on her task in group. Status After Intervention:  Unchanged    Participation Level:  Active Listener    Participation Quality: Appropriate      Speech:  quiet      Thought Process/Content: Logical      Affective Functioning: Flat      Mood: depressed      Level of consciousness:  Oriented x4      Response to Learning: Progressing to goal      Endings: None Reported    Modes of Intervention: Education, Socialization and Activity      Discipline Responsible: Psychoeducational Specialist      Signature:  Regulo Panchal

## 2019-05-06 LAB
GLUCOSE BLD-MCNC: 113 MG/DL (ref 60–115)
GLUCOSE BLD-MCNC: 118 MG/DL (ref 60–115)
PERFORMED ON: ABNORMAL
PERFORMED ON: NORMAL

## 2019-05-06 PROCEDURE — 99232 SBSQ HOSP IP/OBS MODERATE 35: CPT | Performed by: PSYCHIATRY & NEUROLOGY

## 2019-05-06 PROCEDURE — 99222 1ST HOSP IP/OBS MODERATE 55: CPT | Performed by: INTERNAL MEDICINE

## 2019-05-06 PROCEDURE — 6370000000 HC RX 637 (ALT 250 FOR IP): Performed by: PHYSICIAN ASSISTANT

## 2019-05-06 PROCEDURE — 6370000000 HC RX 637 (ALT 250 FOR IP): Performed by: PSYCHIATRY & NEUROLOGY

## 2019-05-06 PROCEDURE — 1240000000 HC EMOTIONAL WELLNESS R&B

## 2019-05-06 PROCEDURE — 6370000000 HC RX 637 (ALT 250 FOR IP): Performed by: NURSE PRACTITIONER

## 2019-05-06 PROCEDURE — 94640 AIRWAY INHALATION TREATMENT: CPT

## 2019-05-06 PROCEDURE — 93010 ELECTROCARDIOGRAM REPORT: CPT | Performed by: INTERNAL MEDICINE

## 2019-05-06 RX ORDER — HYDROXYZINE HYDROCHLORIDE 25 MG/1
50 TABLET, FILM COATED ORAL EVERY 6 HOURS PRN
Status: DISCONTINUED | OUTPATIENT
Start: 2019-05-06 | End: 2019-05-10 | Stop reason: HOSPADM

## 2019-05-06 RX ORDER — HYDROXYZINE PAMOATE 25 MG/1
50 CAPSULE ORAL EVERY 6 HOURS PRN
Status: DISCONTINUED | OUTPATIENT
Start: 2019-05-06 | End: 2019-05-10 | Stop reason: HOSPADM

## 2019-05-06 RX ORDER — BUSPIRONE HYDROCHLORIDE 5 MG/1
5 TABLET ORAL 3 TIMES DAILY
Status: DISCONTINUED | OUTPATIENT
Start: 2019-05-06 | End: 2019-05-07

## 2019-05-06 RX ADMIN — QUETIAPINE FUMARATE 50 MG: 50 TABLET ORAL at 21:18

## 2019-05-06 RX ADMIN — MOMETASONE FUROATE AND FORMOTEROL FUMARATE DIHYDRATE 2 PUFF: 100; 5 AEROSOL RESPIRATORY (INHALATION) at 19:58

## 2019-05-06 RX ADMIN — HYDROXYZINE HYDROCHLORIDE 50 MG: 25 TABLET ORAL at 13:35

## 2019-05-06 RX ADMIN — BUSPIRONE HYDROCHLORIDE 5 MG: 5 TABLET ORAL at 21:18

## 2019-05-06 RX ADMIN — BUSPIRONE HYDROCHLORIDE 5 MG: 5 TABLET ORAL at 13:39

## 2019-05-06 RX ADMIN — MULTIPLE VITAMINS W/ MINERALS TAB 1 TABLET: TAB at 08:45

## 2019-05-06 RX ADMIN — DICYCLOMINE HYDROCHLORIDE 20 MG: 20 TABLET ORAL at 21:18

## 2019-05-06 RX ADMIN — DICYCLOMINE HYDROCHLORIDE 20 MG: 20 TABLET ORAL at 16:37

## 2019-05-06 RX ADMIN — DICYCLOMINE HYDROCHLORIDE 20 MG: 20 TABLET ORAL at 08:45

## 2019-05-06 RX ADMIN — FLUOXETINE HYDROCHLORIDE 20 MG: 20 CAPSULE ORAL at 08:45

## 2019-05-06 RX ADMIN — DICYCLOMINE HYDROCHLORIDE 20 MG: 20 TABLET ORAL at 12:51

## 2019-05-06 RX ADMIN — TRAMADOL HYDROCHLORIDE 50 MG: 50 TABLET, FILM COATED ORAL at 16:40

## 2019-05-06 RX ADMIN — TRAMADOL HYDROCHLORIDE 50 MG: 50 TABLET, FILM COATED ORAL at 08:45

## 2019-05-06 RX ADMIN — MOMETASONE FUROATE AND FORMOTEROL FUMARATE DIHYDRATE 2 PUFF: 100; 5 AEROSOL RESPIRATORY (INHALATION) at 08:34

## 2019-05-06 ASSESSMENT — PAIN SCALES - GENERAL
PAINLEVEL_OUTOF10: 6
PAINLEVEL_OUTOF10: 6
PAINLEVEL_OUTOF10: 4

## 2019-05-06 NOTE — GROUP NOTE
Group Therapy Note    Date: May 5    Group Start Time: 2050  Group End Time: 2105  Group Topic: Wrap-Up    [facility]    Ary Lapping        Group Therapy Note    Attendees: 7       Pt did not attend group.

## 2019-05-06 NOTE — PROGRESS NOTES
[facility] FOLLOW-UP NOTE     5/6/2019     Patient was seen and examined in person, Chart reviewed   Patient's case discussed with staff/team    Chief Complaint: Depression, anxiety    Interim History:     Pt has been feeling depressed  No change in presentation  Pt did not want any other antidepressant other than prozac  Has been feeling more anxious  Hopeless and worthless - less intense    Appetite:   [] Normal/Unchanged  [] Increased  [x] Decreased      Sleep:       [] Normal/Unchanged  [] Fair       [x] Poor              Energy:    [] Normal/Unchanged  [] Increased  [x] Decreased        SI [] Present  [x] Absent    HI  []Present  [x] Absent     Aggression:  [] yes  [] no    Patient is [] able  [x] unable to CONTRACT FOR SAFETY     PAST MEDICAL/PSYCHIATRIC HISTORY:   Past Medical History:   Diagnosis Date    Asthma     Chronic back pain greater than 3 months duration 2012    Dr Duque Sprain COPD (chronic obstructive pulmonary disease) ([facility] 2017    Dr Yaya Menendez    Depression 2010    Dr Deal [facility]), hospital admissions, counseling Sandra Espana    Family history of malignant neoplasm of breast     H/O vitamin D deficiency     Invasive ductal carcinoma of left breast ([facility] 05/2018    T1N0M0 ERPR+ her2-, radiation, no chemo, Dr Senthil Werner       FAMILY/SOCIAL HISTORY:  Family History   Problem Relation Age of Onset    Heart Failure Mother         dec 80    Osteoarthritis Mother     Cancer Mother 61        breast     Breast Cancer Mother     Heart Attack Father         dec 58    Hypertension Father     High Cholesterol Father     Stroke Sister     No Known Problems Son      Social History     Socioeconomic History    Marital status: Single     Spouse name: Not on file    Number of children: 2    Years of education: Not on file    Highest education level: Not on file   Occupational History    Occupation: , now on [employer] strain: Not on file    Food insecurity:     Worry: Not on file     Inability: Not on file    Transportation needs:     Medical: Not on file     Non-medical: Not on file   Tobacco Use    Smoking status: Former Smoker     Packs/day: 1.00     Years: 20.00     Pack years: 20.00     Types: Cigarettes     Last attempt to quit: 2017     Years since quittin.4    Smokeless tobacco: Never Used   Substance and Sexual Activity    Alcohol use: Yes     Alcohol/week: 0.0 oz     Comment: social    Drug use: No    Sexual activity: Yes   Lifestyle    Physical activity:     Days per week: Not on file     Minutes per session: Not on file    Stress: Not on file   Relationships    Social connections:     Talks on phone: Not on file     Gets together: Not on file     Attends Baptism service: Not on file     Active member of club or organization: Not on file     Attends meetings of clubs or organizations: Not on file     Relationship status: Not on file    Intimate partner violence:     Fear of current or ex partner: Not on file     Emotionally abused: Not on file     Physically abused: Not on file     Forced sexual activity: Not on file   Other Topics Concern    Not on file   Social History Narrative    Born in [place], one of 3 siblings, 2 half sisters (does not talk to them since )    Single, , 2 sons, in [place] and [address] [place] [address] 5, does some babysitting    Lives in a mobil home in [place] alone    Worked as a  x 15 years    [place] 5, attends [employer]     On Medicare for depression x            ROS:  [x] All negative/unchanged except if checked.  Explain positive(checked items) below:  [] Constitutional  [] Eyes  [] Ear/Nose/Mouth/Throat  [] Respiratory  [] CV  [] GI  []   [] Musculoskeletal  [] Skin/Breast  [] Neurological  [] Endocrine  [] Heme/Lymph  [] Allergic/Immunologic    Explanation:     MEDICATIONS:    Current Facility-Administered Medications:     insulin lispro (HUMALOG) injection vial 0-12 Units, 0-12 Units, Subcutaneous, TID WC, Jacques Funk MD    insulin lispro (HUMALOG) injection vial 0-6 Units, 0-6 Units, Subcutaneous, Nightly, Jacques Funk MD    hydrOXYzine (VISTARIL) capsule 50 mg, 50 mg, Oral, Q6H PRN **OR** hydrOXYzine (ATARAX) tablet 50 mg, 50 mg, Oral, Q6H PRN, Sara Guerrero MD    busPIRone (BUSPAR) tablet 5 mg, 5 mg, Oral, TID, Sara Guerrero MD    traMADol (ULTRAM) tablet 50 mg, 50 mg, Oral, Q6H PRN, Toan Gallardo MD, 50 mg at 05/06/19 0845    albuterol (PROVENTIL) nebulizer solution 2.5 mg, 2.5 mg, Nebulization, Q6H PRN, Marylee Friedlander, APRN - CNP    albuterol sulfate  (90 Base) MCG/ACT inhaler 2 puff, 2 puff, Inhalation, Q6H PRN, Marylee Friedlander, APRN - CNP    mometasone-formoterol (DULERA) 100-5 MCG/ACT inhaler 2 puff, 2 puff, Inhalation, BID, Marylee Friedlander, APRN - CNP, 2 puff at 05/06/19 0834    lidocaine 4 % external patch 1 patch, 1 patch, Transdermal, Daily, Marylee Friedlander, APRN - CNP, 1 patch at 05/05/19 1335    therapeutic multivitamin-minerals 1 tablet, 1 tablet, Oral, Daily, Marylee Friedlander, APRN - CNP, 1 tablet at 05/06/19 0845    QUEtiapine (SEROQUEL) tablet 50 mg, 50 mg, Oral, Nightly, Jason Gallardo MD, 50 mg at 05/05/19 2034    FLUoxetine (PROZAC) capsule 20 mg, 20 mg, Oral, Daily, Jason Gallardo MD, 20 mg at 05/06/19 0845    acetaminophen (TYLENOL) tablet 650 mg, 650 mg, Oral, Q4H PRN, Toan Gallardo MD    magnesium hydroxide (MILK OF MAGNESIA) 400 MG/5ML suspension 30 mL, 30 mL, Oral, Daily PRN, Jason Gallardo MD    haloperidol (HALDOL) tablet 5 mg, 5 mg, Oral, Q6H PRN **OR** haloperidol lactate (HALDOL) injection 5 mg, 5 mg, Intramuscular, Q6H PRN, Jason Gallardo MD    traZODone (DESYREL) tablet 50 mg, 50 mg, Oral, Nightly PRN, Jason Gallardo MD    ondansetron (ZOFRAN-ODT) disintegrating tablet 4 mg, 4 mg, Oral, Q8H PRN, Destiny Freire PA-C, 4 mg at Plan:  Reviewed current Medications with the patient. Medication as ordered  Risks, benefits, side effects, drug-to-drug interactions and alternatives to treatment were discussed. Collateral information:   CD evaluation  Encourage patient to attend group and other milieu activities.   Discharge planning discussed with the patient and treatment team.    PSYCHOTHERAPY/COUNSELING:  [x] Therapeutic interview  [x] Supportive  [] CBT  [] Ongoing  [] Other    [x] Patient continues to need, on a daily basis, active treatment furnished directly by or requiring the supervision of inpatient psychiatric personnel      Anticipated Length of stay:            Electronically signed by Jessica Barnett MD on 5/6/2019 at 1:29 PM

## 2019-05-06 NOTE — PROGRESS NOTES
Fluids encouraged due to lower BP.  Electronically signed by Daisy Duong LPN on 5/9/3670 at 10:85 AM

## 2019-05-06 NOTE — PROGRESS NOTES
Pt denies SI/HI or AVH. Patient states dep and anx 5/10. Patient states she does not like the anxious feeling that she gets. Patient does not want meds that  make her tired. Pt polite, cooperative and participates in care. Pt takes meds and asks for Ultram for her pain. Sleep is disturbed pt states she wakes up several times throughout the night. Patient looks tired. Patient out not seen social on the unit attending groups.  Electronically signed by Christian Dutta LPN on 4/1/0911 at 2:40 PM

## 2019-05-06 NOTE — GROUP NOTE
Group Therapy Note    Date: May 5    Group Start Time: 6711  Group End Time: 1720  Group Topic: Recreational    [facility]    Inez Began        Group Therapy Note    Attendees: 7      Pt participated in group activity.        Status After Intervention:  Improved    Participation Level: Interactive    Participation Quality: Appropriate and Attentive      Speech:  normal      Thought Process/Content: Logical      Affective Functioning: Congruent      Mood: euthymic      Level of consciousness:  Alert, Oriented x4 and Attentive      Response to Learning: Able to verbalize current knowledge/experience and Progressing to goal      Endings: None Reported    Modes of Intervention: Support and Socialization      Discipline Responsible: Behavorial Health Tech      Signature:  Inez Began

## 2019-05-06 NOTE — GROUP NOTE
Group Therapy Note    Date: May 6    Group Start Time: 1108  Group End Time: 1155  Group Topic: Psychotherapy    [facility]    Martine Garcia Sumpto        Group Therapy Note    Attendees:11         Patient's Goal: patient stated she does not want to state goal    Notes:  Patient was an active listener but did not want to talk    Status After Intervention:  Unchanged    Participation Level: None    Participation Quality: Attentive      Speech:  normal      Thought Process/Content: Logical      Affective Functioning: Congruent      Mood: anxious      Level of consciousness:  Alert      Response to Learning: Progressing to goal      Endings: None Reported    Modes of Intervention: Support      Discipline Responsible: /Counselor      Signature:  Aguilar Boss [facility] [employer]

## 2019-05-06 NOTE — PROGRESS NOTES
Department of Psychiatry  Attending Progress Note      SUBJECTIVE:  Patient was admitted for severe depression making statements like she wants to join her  parents. She has also stopped taking her chemotherapy medication despite her encouragement not to.  Patient was not sleeping was starting to become paranoid  Today she reports slight improvement however she is still anxious and sleep is not adequate    OBJECTIVE; patient still anxious looking  Slept slightly better  Physical  VITALS:  BP 94/61   Pulse 80   Temp 98 °F (36.7 °C) (Oral)   Resp 18   Ht 5' 4\" (1.626 m)   Wt 160 lb (72.6 kg)   LMP 2014   SpO2 96%   BMI 27.46 kg/m²   CONSTITUTIONAL:  awake, alert, cooperative, no apparent distress, and appears stated age  Mental Status Examination:  Level of consciousness:  within normal limits  Appearance:  well-appearing  Behavior/Motor:  psychomotor retardation  Attitude toward examiner:  cooperative and attentive  Speech:  spontaneous, normal rate and normal volume  Mood:  sad  Affect:  blunted  Thought processes:  poverty of thought  Thought content:  Homocidal ideation deneis  Suicidal Ideation:  passive  Delusions:  Paranoid, soft paranoia  Perceptual Disturbance:  denies any perceptual disturbance  Cognition:  oriented to person, place, and time    Data  Labs:    CBC with Differential:    Lab Results   Component Value Date    WBC 10.2 2019    RBC 4.45 2019    HGB 14.4 2019    HCT 41.8 2019     2019    MCV 93.9 2019    MCH 32.3 2019    MCHC 34.4 2019    RDW 12.9 2019    LYMPHOPCT 19.6 2019    MONOPCT 4.2 2019    BASOPCT 0.7 2019    MONOSABS 0.4 2019    LYMPHSABS 2.0 2019    EOSABS 0.1 2019    BASOSABS 0.1 2019     CMP:    Lab Results   Component Value Date     2019    K 3.6 2019    CL 99 2019    CO2 26 2019    BUN 11 2019    CREATININE 0.60 2019

## 2019-05-06 NOTE — PROGRESS NOTES
Pt. attended the 0900 community meeting.  Electronically signed by Imer Smith on 5/6/2019 at 9:25 AM

## 2019-05-06 NOTE — CONSULTS
Consults   Hematology/Oncology Consult  Encounter Date: 2019 7:51 PM    Ms. Esvin Palomo is a 47 y.o. female  : 1965  MRN: [id number]  [facility]  Requesting Provider: Dr. Kerry Milton    Reason for request: left breast cancer; pt stopped taking endocrine tx  In 3/2019      CONSULTANT: Karena Barahona MD    HPI: The pt is a 48 yo WF who was found to have a left breast mass on mammogram in 2018. Left breast Bx showed invasive ductal CA measuring 1.5 cm, grade 2, ER/MN positive, HER2/zoraida negative. Excision of the left breast mass was done on 18 which showed 1.5 x 1.5 cm invasive cancer ; 3 nodes were negative for metastasis. Oncotype score was 6. She completed radiation tx in 2018. Note pt  Became amenorrheic in . She started adjuvant endocrine tx with Tamoxifen in 2018 but stopped because of side-effects. Her tx was then changed to Anastrozole in 10/2018. The pt , however , developed mood swings and felt depressed so she stopped taking anastrozole in late 3/2019. She subsequently had some improvement in her depression but did not resolve. The pt then went to the ER and was hospitalized at [facility] health unit for tx of depression. No HA or dizziness; no fever; no cough or SOB; no CP, no abd pain or N/V, no rectal bleeding; no hematuria or dysuria; no vaginal bleeding. no back or joint pain.     Patient Active Problem List   Diagnosis    Family history of malignant neoplasm of breast    H/O vitamin D deficiency    Major depressive disorder, recurrent episode, in partial remission ([address]    Insomnia secondary to depression with anxiety    Asthmatic bronchitis    Acute bronchitis    Tobacco abuse    Chronic obstructive pulmonary disease with acute exacerbation (HCC)    Abnormal ultrasound of breast    Abnormal mammogram    Malignant neoplasm of lower-outer quadrant of left female breast ([address]    Invasive ductal carcinoma of left breast ([address]    [facility]    Depression    Current episode of major depressive disorder without prior episode     Past Medical History:   Diagnosis Date    Asthma     Chronic back pain greater than 3 months duration 2012    Dr Anayeli Ziegler COPD (chronic obstructive pulmonary disease) [facility]) 2017    Dr Lyman Other Depression 2010    Dr Callejas [facility]), hospital admissions, counseling Caleb Richard    Family history of malignant neoplasm of breast     H/O vitamin D deficiency     Invasive ductal carcinoma of left breast ([facility] 05/2018    T1N0M0 ERPR+ her2-, radiation, no chemo, Dr Xochilt Smith     Past Surgical History:   Procedure Laterality Date    BREAST CYST EXCISION Right 2000    benign    BREAST LUMPECTOMY      and SLN evaluation    DILATION AND CURETTAGE OF UTERUS  2012    due to [address]      right index finger / exc tumor mass / at age 9   [facility] [place] [address] Left 5/11/2018    US guided left breast biopsy, excisional    NH REMOVAL OF BREAST LESION Left 5/29/2018    RE EXCISION BREAST CANCER SITE, SENTINEL NODE BIOPSY    TONSILLECTOMY      at age 12     Family History   Problem Relation Age of Onset    Heart Failure Mother         dec 80    Osteoarthritis Mother     Cancer Mother 61        breast     Breast Cancer Mother     Heart Attack Father         dec 58    Hypertension Father     High Cholesterol Father     Stroke Sister     No Known Problems Son      Social History     Socioeconomic History    Marital status: Single     Spouse name: Not on file    Number of children: 2    Years of education: Not on file    Highest education level: Not on file   Occupational History    Occupation: , now on [address] resource strain: Not on file    Food insecurity:     Worry: Not on file     Inability: Not on file    Transportation needs:     Medical: Not on file     Non-medical: Not on file   Tobacco Use    Smoking status: Former Smoker     Packs/day: 1.00     Years: 20.00     Pack years: 20.00     Types: Cigarettes     Last attempt to quit: 2017     Years since quittin.4    Smokeless tobacco: Never Used   Substance and Sexual Activity    Alcohol use:  Yes     Alcohol/week: 0.0 oz     Comment: social    Drug use: No    Sexual activity: Yes   Lifestyle    Physical activity:     Days per week: Not on file     Minutes per session: Not on file    Stress: Not on file   Relationships    Social connections:     Talks on phone: Not on file     Gets together: Not on file     Attends Religion service: Not on file     Active member of club or organization: Not on file     Attends meetings of clubs or organizations: Not on file     Relationship status: Not on file    Intimate partner violence:     Fear of current or ex partner: Not on file     Emotionally abused: Not on file     Physically abused: Not on file     Forced sexual activity: Not on file   Other Topics Concern    Not on file   Social History Narrative    Born in [place], one of 3 siblings, 2 half sisters (does not talk to them since )    Single, , 2 sons, in [address] 5, does some babysitting    Lives in a mobil home in [place] alone    Worked as a  x 15 years    [place] 5, attends [employer]     On Medicare for depression x           Current Facility-Administered Medications   Medication Dose Route Frequency Provider Last Rate Last Dose    insulin lispro (HUMALOG) injection vial 0-12 Units  0-12 Units Subcutaneous TID WC Jacques Funk MD        insulin lispro (HUMALOG) injection vial 0-6 Units  0-6 Units Subcutaneous Nightly Jacques Funk MD        hydrOXYzine (VISTARIL) capsule 50 mg  50 mg Oral Q6H PRN Raj Kineny MD        Or    hydrOXYzine (ATARAX) tablet 50 mg  50 mg Oral Q6H PRN Raj Kinney MD   50 mg at 19 1335    busPIRone (BUSPAR) tablet 5 mg  5 mg Oral TID Kortney Duong MD   5 mg at 05/06/19 1339    traMADol (ULTRAM) tablet 50 mg  50 mg Oral Q6H PRN Justice White MD   50 mg at 05/06/19 1640    albuterol (PROVENTIL) nebulizer solution 2.5 mg  2.5 mg Nebulization Q6H PRN MAURICIO Solis CNP        albuterol sulfate  (90 Base) MCG/ACT inhaler 2 puff  2 puff Inhalation Q6H PRN MAURICIO Lazo CNP        mometasone-formoterol (DULERA) 100-5 MCG/ACT inhaler 2 puff  2 puff Inhalation BID MAURICIO Lazo CNP   2 puff at 05/06/19 0834    lidocaine 4 % external patch 1 patch  1 patch Transdermal Daily MAURICIO Lazo CNP   1 patch at 05/05/19 1335    therapeutic multivitamin-minerals 1 tablet  1 tablet Oral Daily MAURICIO Lazo CNP   1 tablet at 05/06/19 0845    QUEtiapine (SEROQUEL) tablet 50 mg  50 mg Oral Nightly Justice White MD   50 mg at 05/05/19 2034    FLUoxetine (PROZAC) capsule 20 mg  20 mg Oral Daily Justice White MD   20 mg at 05/06/19 0845    acetaminophen (TYLENOL) tablet 650 mg  650 mg Oral Q4H PRN Jason Gallardo MD        magnesium hydroxide (MILK OF MAGNESIA) 400 MG/5ML suspension 30 mL  30 mL Oral Daily PRN Justice White MD        haloperidol (HALDOL) tablet 5 mg  5 mg Oral Q6H PRN Jason Gallardo MD        Or    haloperidol lactate (HALDOL) injection 5 mg  5 mg Intramuscular Q6H PRN Jason Gallardo MD        traZODone (DESYREL) tablet 50 mg  50 mg Oral Nightly PRN Jason Gallardo MD        ondansetron (ZOFRAN-ODT) disintegrating tablet 4 mg  4 mg Oral Q8H PRN Ramiro Carrington PA-C   4 mg at 05/04/19 2224    dicyclomine (BENTYL) injection 20 mg  20 mg Intramuscular 4x Daily Destiny Freire PA-C        Or    dicyclomine (BENTYL) tablet 20 mg  20 mg Oral 4x Daily Destiny Freire PA-C   20 mg at 05/06/19 6037     No outpatient medications have been marked as taking for the 5/3/19 encounter [facility] Encounter). Stage 1 invasive  Ductal CA of left breast s/p excision of breast mass with sentinel lymph node Bx followed by Radiation tx to the left breast and subsequently started adjuvant endocrine tx. She had poor tolerance to Tamoxifen so tx was changed to aromatase inhibitor tx with anastrozole . The pt ,however , had worsening depression while on anastrozole with some improvement after stopping this med. 2. She has  depression for which she has been hospitalized. Thank you, Dr. Ynes Church, for this consultation. I recommend holding off on endocrine adjuvant tx at this time while undergoing in -patient  tx for her depression, I advised the pt to follow-up in our office in 2-3 weeks with Dr. Maddy Segovia to discuss options for additional tx of her breast cancer at that time.     Electronically signed by Awais Borjas MD on 5/6/2019 at 7:51 PM

## 2019-05-06 NOTE — GROUP NOTE
Group Therapy Note    Date: May 6    Group Start Time: 1000  Group End Time: 1100  Group Topic: Psychoeducation    [facility] BELKIS Abarca, CTRS        Group Therapy Note    Attendees: 10         Patient's Goal:  \"to feel better\"    Notes:  Pt. attended the 1000 skill group. Pt. was quiet and to herself but attentive. Status After Intervention:  Improved    Participation Level:  Active Listener and Minimal    Participation Quality: Appropriate and Attentive      Speech:  normal      Thought Process/Content: Logical      Affective Functioning: Flat      Mood: depressed      Level of consciousness:  Alert, Oriented x4 and Attentive      Response to Learning: Progressing to goal      Endings: None Reported    Modes of Intervention: Education, Support, Socialization and Activity      Discipline Responsible: Psychoeducational Specialist      Signature:  Jose Gordon

## 2019-05-06 NOTE — GROUP NOTE
Group Therapy Note    Date: May 5    Group Start Time: 2050  Group End Time: 2105  Group Topic: Wrap-Up    [facility]    Clent Benjamin        Group Therapy Note    Attendees: 7    Pt did not attend group

## 2019-05-06 NOTE — PROGRESS NOTES
Nutrition Assessment (Low Risk)    Type and Reason for Visit: Initial, Positive Nutrition Screen(wt loss)    Nutrition Recommendations: Continue General diet. Nutrition Assessment:  Patient assessed for nutritional risk. Deemed to be at low risk at this time. Will continue to monitor for changes in status. Pt nutritionally compromised on admission evidenced by weight loss. Pt remains at risk due to chronic illness (COPD, CA) and admission with MDD.   Anticipate appetite/po intake to improve with inpatient behavioral health treatment    Malnutrition Assessment:  · Malnutrition Status: Insufficient data    Nutrition Risk Level   Risk Level: Low    Nutrition Diagnosis:   · Problem: Unintended weight loss  · Etiology: Psychological cause/life stress, Catabolic illness    Signs and symptoms: Weight loss    Nutrition Intervention:  Food and/or Delivery: Continue current diet  Nutrition Education/Counseling/Coordination of Care:  Continued Inpatient Monitoring      Electronically signed by Glo Ca RD, LD on 5/6/19 at 9:59 AM

## 2019-05-06 NOTE — GROUP NOTE
Group Therapy Note    Date: May 6    Group Start Time: 1435  Group End Time: 1510  Group Topic: Cognitive Skills    [facility] I    SUSHILA Su        Group Therapy Note           Patient's Goal:  To participate in mood management group. Notes:  Patient learned to set realistic goals. Status After Intervention:  Improved    Participation Level: Active Listener    Participation Quality: Appropriate      Speech:  normal      Thought Process/Content: Logical      Affective Functioning: Congruent      Mood: elevated      Level of consciousness:  Alert      Response to Learning: Able to verbalize current knowledge/experience      Endings: None Reported    Modes of Intervention: Education      Discipline Responsible: /Counselor      Signature:   SUSHILA Su

## 2019-05-07 LAB
GLUCOSE BLD-MCNC: 87 MG/DL (ref 60–115)
GLUCOSE BLD-MCNC: 90 MG/DL (ref 60–115)
PERFORMED ON: NORMAL
PERFORMED ON: NORMAL

## 2019-05-07 PROCEDURE — 99232 SBSQ HOSP IP/OBS MODERATE 35: CPT | Performed by: PHYSICIAN ASSISTANT

## 2019-05-07 PROCEDURE — 99232 SBSQ HOSP IP/OBS MODERATE 35: CPT | Performed by: PSYCHIATRY & NEUROLOGY

## 2019-05-07 PROCEDURE — 1240000000 HC EMOTIONAL WELLNESS R&B

## 2019-05-07 PROCEDURE — 6370000000 HC RX 637 (ALT 250 FOR IP): Performed by: PSYCHIATRY & NEUROLOGY

## 2019-05-07 PROCEDURE — 6370000000 HC RX 637 (ALT 250 FOR IP): Performed by: NURSE PRACTITIONER

## 2019-05-07 PROCEDURE — 94640 AIRWAY INHALATION TREATMENT: CPT

## 2019-05-07 PROCEDURE — 6370000000 HC RX 637 (ALT 250 FOR IP): Performed by: PHYSICIAN ASSISTANT

## 2019-05-07 RX ORDER — CLONAZEPAM 0.5 MG/1
0.25 TABLET ORAL 2 TIMES DAILY
Status: DISCONTINUED | OUTPATIENT
Start: 2019-05-07 | End: 2019-05-10 | Stop reason: HOSPADM

## 2019-05-07 RX ADMIN — MOMETASONE FUROATE AND FORMOTEROL FUMARATE DIHYDRATE 2 PUFF: 100; 5 AEROSOL RESPIRATORY (INHALATION) at 00:00

## 2019-05-07 RX ADMIN — BUSPIRONE HYDROCHLORIDE 5 MG: 5 TABLET ORAL at 08:44

## 2019-05-07 RX ADMIN — DICYCLOMINE HYDROCHLORIDE 20 MG: 20 TABLET ORAL at 17:21

## 2019-05-07 RX ADMIN — CLONAZEPAM 0.25 MG: 0.5 TABLET ORAL at 14:45

## 2019-05-07 RX ADMIN — FLUOXETINE HYDROCHLORIDE 20 MG: 20 CAPSULE ORAL at 08:44

## 2019-05-07 RX ADMIN — DICYCLOMINE HYDROCHLORIDE 20 MG: 20 TABLET ORAL at 20:38

## 2019-05-07 RX ADMIN — MULTIPLE VITAMINS W/ MINERALS TAB 1 TABLET: TAB at 08:44

## 2019-05-07 RX ADMIN — CLONAZEPAM 0.25 MG: 0.5 TABLET ORAL at 20:38

## 2019-05-07 RX ADMIN — QUETIAPINE FUMARATE 50 MG: 50 TABLET ORAL at 20:38

## 2019-05-07 RX ADMIN — DICYCLOMINE HYDROCHLORIDE 20 MG: 20 TABLET ORAL at 12:45

## 2019-05-07 RX ADMIN — TRAMADOL HYDROCHLORIDE 50 MG: 50 TABLET, FILM COATED ORAL at 17:23

## 2019-05-07 RX ADMIN — TRAMADOL HYDROCHLORIDE 50 MG: 50 TABLET, FILM COATED ORAL at 08:44

## 2019-05-07 RX ADMIN — MOMETASONE FUROATE AND FORMOTEROL FUMARATE DIHYDRATE 2 PUFF: 100; 5 AEROSOL RESPIRATORY (INHALATION) at 07:19

## 2019-05-07 RX ADMIN — DICYCLOMINE HYDROCHLORIDE 20 MG: 20 TABLET ORAL at 08:44

## 2019-05-07 ASSESSMENT — LIFESTYLE VARIABLES: HISTORY_ALCOHOL_USE: NO

## 2019-05-07 ASSESSMENT — PAIN SCALES - GENERAL
PAINLEVEL_OUTOF10: 4
PAINLEVEL_OUTOF10: 8
PAINLEVEL_OUTOF10: 4
PAINLEVEL_OUTOF10: 6

## 2019-05-07 NOTE — PROGRESS NOTES
Endocrinology Progress Note    Assessment and Plan:   Assessment-  1. Hyperglycemia  2. Depression and anxiety        Plan-  1. Discontinue POC T glucose checks  2. Endocrinology will sign off this patient's care  3. Follow-up with primary care physician    POC Glucose:   Recent Labs     05/06/19  1700 05/06/19  2059 05/07/19  0701 05/07/19  1203   POCGLU 113 118* 87 90     HGBA1C:Results for Neftaly Pak (MRN [id number]) as of 5/7/2019 16:59   Ref. Range 5/3/2019 19:13   Hemoglobin A1C Latest Ref Range: 4.8 - 5.9 % 5.5     CBC:   No results for input(s): WBC, HGB, PLT in the last 72 hours. CMP:  No results for input(s): NA, K, CL, CO2, BUN, CREATININE, GLUCOSE, CALCIUM, LABGLOM in the last 72 hours. CC:   Chief Complaint   Patient presents with   [facility] Psychiatric Evaluation     Deparession. Pt recently started on new meds, per pt, pt not feeling any better. Subjective: Interval History: Patient is a 36-year-old female admitted to our behavioral health unit, experienced hyperglycemia. This has since resolved.     Review of systems: denies polyuria, polydipsia, ABD pain, flank pain, N/V/D, or diaphoresis  Medications:   Scheduled Meds:   clonazePAM  0.25 mg Oral BID    [START ON 5/8/2019] FLUoxetine  30 mg Oral Daily    insulin lispro  0-12 Units Subcutaneous TID WC    insulin lispro  0-6 Units Subcutaneous Nightly    mometasone-formoterol  2 puff Inhalation BID    lidocaine  1 patch Transdermal Daily    therapeutic multivitamin-minerals  1 tablet Oral Daily    QUEtiapine  50 mg Oral Nightly    dicyclomine  20 mg Intramuscular 4x Daily    Or    dicyclomine  20 mg Oral 4x Daily     Continuous Infusions:    Objective:   Vitals: BP (!) 91/59 Comment: Nurse aware  Pulse 69   Temp 97 °F (36.1 °C) (Oral)   Resp 18   Ht 5' 4\" (1.626 m)   Wt 160 lb (72.6 kg)   LMP 05/08/2014   SpO2 93%   BMI 27.46 kg/m²    Wt Readings from Last 3 Encounters:   05/03/19 160 lb (72.6 kg)   04/12/19 159 lb 12.8 oz (72.5 kg)   04/02/19 166 lb (75.3 kg)        General appearance: alert, appears stated age, cooperative and no distress  Skin: Skin color, texture, turgor normal. No rashes or lesions. Neck: no lymphadenopathy  Lungs: clear to auscultation bilaterally  Heart: regular rate and rhythm, S1, S2 normal, no murmur, click, rub or gallop  Abdomen: soft, non-tender. Bowel sounds normal. No masses,  no organomegaly.   Extremities: extremities normal, atraumatic, no cyanosis or edema    Patient Active Problem List:     Family history of malignant neoplasm of breast     H/O vitamin D deficiency     Major depressive disorder, recurrent episode, in partial remission ([address]     Insomnia secondary to depression with anxiety     Asthmatic bronchitis     Acute bronchitis     Tobacco abuse     Chronic obstructive pulmonary disease with acute exacerbation (HCC)     Abnormal ultrasound of breast     Abnormal mammogram     Malignant neoplasm of lower-outer quadrant of left female breast ([address]     Invasive ductal carcinoma of left breast (HCC)     COPD (chronic obstructive pulmonary disease) ([address]     Depression     Current episode of major depressive disorder without prior episode     Hyperglycemia            Electronically signed by EDWINA Johnson on 5/7/2019 at 4:58 PM

## 2019-05-07 NOTE — CARE COORDINATION
Brief Intervention and Referral to Treatment Summary    Patient was provided PHQ-9, AUDIT and DAST Screening:      PHQ-9 Score: 16  AUDIT Score:  0  DAST Score:  0    Patients substance use is considered     Low Risk/Healthy x  Moderate Risk  Harmful  Dependent    Patients depression is considered:     Minimal  Mild   Moderate  Moderately Severe x  Severe    Brief Education Was Provided    Patient was receptivex  Patient was not receptive      Brief Intervention Is Provided (Only for AUDIT or DAST)     Patient reports readiness to decrease and/or stop use and a plan was discussed   Patient denies readiness to decrease and/or stop use and a plan was not discussed      Recommendations/Referrals for Brief and/or Specialized Treatment Provided to Patient   Patient denies any use of drugs or alcohol. Patient tox screen tested positive for THC. Patient has her first apptmt at [facility] this month.    Electronically signed by Misa Gray Numerate Rodney on 5/7/2019 at 2:12 PM

## 2019-05-07 NOTE — PROGRESS NOTES
Pt continues with the internal anxiety but states she is feeling ok. Pt asks for the Ultram before dinner. Pt sleep is poor waking several times. Patient denies SI/HI or AVH. Patients Buspar is switched to Klonopin. Pt is told to keep the staff informed how she is feeling. Patient agrees. Pt is not seen social but is in the tv area. Patient dep and anx continue to be 4-5/10. Wound Care: Petrolatum

## 2019-05-07 NOTE — CARE COORDINATION
FAMILY COLLATERAL NOTE    Family/Support Name: Katy Vincent  Contact #: [phone]  Relationship to Pt: son      Placed call to above for collateral. Left a message requesting a return call.      Response:  LM  Electronically signed by HELENE Whatley on 5/7/2019 at 2:31 PM        Anna Whatley 55

## 2019-05-07 NOTE — CARE COORDINATION
BHI Biopsychosocial Assessment    Current Level of Psychosocial Functioning     Independent x  Dependent    Minimal Assist     Comments:  Patient lives alone and gives permission to speak to her son for collateral. [place] # [phone]. Psychosocial High Risk Factors (check all that apply)    Unable to obtain meds   Chronic illness/pain    Substance abuse   Lack of Family Support   Financial stress   Isolation   Inadequate Community Resources  Suicide attempt(s)  Not taking medications   Victim of crime   Developmental Delay  Unable to manage personal needs    Age 72 or older   Homeless  No transportation   Readmission within 30 days  Unemployment  Traumatic Event    Comments:   Sexual Orientation:  Patient does not identify either way. Patient Strengths:housing, apptmt at [facility], support system    Patient Barriers: meds not working, symptoms increasing. Plan of Care     medication management, group/individual therapies, family meetings, psycho -education, treatment team meetings to assist with stabilization    Initial Discharge Plan:  Call collateral, verify apptmt      Clinical Summary:  Assessed patient who was cooperative but stated that the new meds were making her feel foggy and more isolative. Patient stated that recently her meds were changed and she did not feel like her meds were working. Patient deneis SI/HI, denies A/V hallucinations.   Electronically signed by Kurt Reese, [facility] on 5/7/2019 at 2:18 PM

## 2019-05-07 NOTE — PROGRESS NOTES
Group Therapy Note    Date: 5/7/19  Start [id number]  End Time:  7723    Number of Participants: 7    Type of Group: Psychoeducation    Patient's Goal: To participate in mood management group. Notes: Patient declined to attend psychoeducation group at  despite encouragement by staff.      Discipline Responsible: /Counselor    SUSHILA Hernandez

## 2019-05-07 NOTE — PROGRESS NOTES
Patient takes Ultram when she gets up and again around dinner time.  Electronically signed by Akash Garcia LPN on 1/6/7856 at 3:57 PM

## 2019-05-07 NOTE — PROGRESS NOTES
Not on file     Inability: Not on file    Transportation needs:     Medical: Not on file     Non-medical: Not on file   Tobacco Use    Smoking status: Former Smoker     Packs/day: 1.00     Years: 20.00     Pack years: 20.00     Types: Cigarettes     Last attempt to quit: 2017     Years since quittin.4    Smokeless tobacco: Never Used   Substance and Sexual Activity    Alcohol use: Yes     Alcohol/week: 0.0 oz     Comment: social    Drug use: No    Sexual activity: Yes   Lifestyle    Physical activity:     Days per week: Not on file     Minutes per session: Not on file    Stress: Not on file   Relationships    Social connections:     Talks on phone: Not on file     Gets together: Not on file     Attends Jainism service: Not on file     Active member of club or organization: Not on file     Attends meetings of clubs or organizations: Not on file     Relationship status: Not on file    Intimate partner violence:     Fear of current or ex partner: Not on file     Emotionally abused: Not on file     Physically abused: Not on file     Forced sexual activity: Not on file   Other Topics Concern    Not on file   Social History Narrative    Born in [place], one of 3 siblings, 2 half sisters (does not talk to them since )    Single, , 2 sons, in [place] and [address] [place] [address] 5, does some babysitting    Lives in a mobil home in [place] alone    Worked as a  x 15 years    [place] 5, attends [employer]     On Medicare for depression x            ROS:  [x] All negative/unchanged except if checked.  Explain positive(checked items) below:  [] Constitutional  [] Eyes  [] Ear/Nose/Mouth/Throat  [] Respiratory  [] CV  [] GI  []   [] Musculoskeletal  [] Skin/Breast  [] Neurological  [] Endocrine  [] Heme/Lymph  [] Allergic/Immunologic    Explanation:     MEDICATIONS:    Current Facility-Administered Medications:     clonazePAM (KLONOPIN) tablet 0.25 mg, 0.25 mg, Oral, BID, Manish Marroquin Plan:  Reviewed current Medications with the patient. Medication as ordered  Risks, benefits, side effects, drug-to-drug interactions and alternatives to treatment were discussed. Collateral information: pending  CD evaluation  Encourage patient to attend group and other milieu activities.   Discharge planning discussed with the patient and treatment team.    PSYCHOTHERAPY/COUNSELING:  [x] Therapeutic interview  [x] Supportive  [] CBT  [] Ongoing  [] Other    [x] Patient continues to need, on a daily basis, active treatment furnished directly by or requiring the supervision of inpatient psychiatric personnel      Anticipated Length of stay:            Electronically signed by Roxanna Aguirre MD on 5/7/2019 at 3:40 PM

## 2019-05-07 NOTE — CARE COORDINATION
FAMILY COLLATERAL NOTE    Family/Support Name: Tomi Pimentel  Contact #:[phone]  Relationship to Pt[de-identified] son        Family/Support contact aware of hospitalization:  Presenting Symptoms/Current Concerns:Was very depressed and paranoid. In Oct. And Nov. She was very mean. She was brining this up from 2 years ago then it went to depression, anxiety and paranoia. Thinking we are ganging up on her and the neighbors are watching her. She gets in a phase where she does not want to talk. . I don't even ask her if she takes her meds, she gets defensive. The last med they gave her she did not like so Im not sure if she was taking it, I don't know what the name of it is. Not saying anything about wanting to hurt self or others. Top 3 Life Stressors: 1. her other son moving to [place] and taking the kids 2. Financial worries 3. Not having Medicaid anymore        Background History Relevant to Current Hospitalization:  She tends to amplify every little thing. She usually does not want to get help. The counselor she was seeing went on FMLA  At [place] and she did not go back. In 2015 she was like this but was not as bad. I think this time she is more decompensated. In the past she has thought the government was watching her. This breakdown happens every two to three years. I can usually tell cause she gets very mean. Family Mental Health/Substance Use History:maternal grandmother had dementia and would get mean and depressed. Support Network's Goal for Hospitalization: stabilize on a med that works , make sure she has the correct diagnosis.      Discharge Plan: can return home, needs follow up [place]      Support Network Supportive of Discharge Plan: yes      Support can confirm Safety of Location and Security of Weapons: no weapons in the home      Support agreeable to Safeguard and Monitor Medications (including Prescription and OTC): I believe she takes her medsications    Identified Barriers to Compliance with Discharge Plan: not following through with treatment.      Recommendations for Support Network: Call if any questions  Electronically signed by Kurt Reese [facility] on 5/7/2019 at 3:32 PM      Kurt Reese [facility]

## 2019-05-07 NOTE — PROGRESS NOTES
Pt denies SI/HI or AVH. Patient still complains of anxiety and dep the same 4-5/10. Patient states that she does not feel that the vistaril that she was given yesterday helped. Patient states she has this nervous feeling inside that will not go away. Pt appetite is good. Pt sleep better but pt still waking up. Patient out on the unit, not seen social watching tv. Patient polite and cooperative with care.  Electronically signed by Manuel Urrutia LPN on 1/3/7314 at 4:57 AM

## 2019-05-07 NOTE — GROUP NOTE
Group Therapy Note    Date: May 6    Group Start Time: 0830  Group End Time: 0900  Group Topic: Wrap-Up    ML[facility] 3W BELKIS Camara    Patient did attend Wrap-Up Group, and participated well with others.         Signature:  Meg Camara

## 2019-05-07 NOTE — GROUP NOTE
Group Therapy Note    Date: May 7    Group Start Time: 1100  Group End Time: 1200  Group Topic: Psychotherapy    ML 3W [facility]    REESE Cantrell        Group Therapy Note    Attendees: 11          Patient Goals: Patient will identify benefits of properly utilizing an effective support group    Notes: Patient could identify benefits of properly utilizing an effective support group    Status After Intervention:  Improved    Participation Level:  Active Listener    Participation Quality: Attentive      Speech:  normal      Thought Process/Content: Logical      Affective Functioning: Congruent      Mood: depressed      Level of consciousness:  Attentive      Response to Learning: Progressing to goal      Endings: None Reported    Modes of Intervention: Support      Discipline Responsible: /Counselor      Signature:  REESE Cantrell

## 2019-05-08 PROCEDURE — 1240000000 HC EMOTIONAL WELLNESS R&B

## 2019-05-08 PROCEDURE — 6370000000 HC RX 637 (ALT 250 FOR IP): Performed by: PSYCHIATRY & NEUROLOGY

## 2019-05-08 PROCEDURE — 6370000000 HC RX 637 (ALT 250 FOR IP): Performed by: NURSE PRACTITIONER

## 2019-05-08 PROCEDURE — 94640 AIRWAY INHALATION TREATMENT: CPT

## 2019-05-08 PROCEDURE — 99232 SBSQ HOSP IP/OBS MODERATE 35: CPT | Performed by: PSYCHIATRY & NEUROLOGY

## 2019-05-08 PROCEDURE — 6370000000 HC RX 637 (ALT 250 FOR IP): Performed by: PHYSICIAN ASSISTANT

## 2019-05-08 RX ADMIN — MULTIPLE VITAMINS W/ MINERALS TAB 1 TABLET: TAB at 08:12

## 2019-05-08 RX ADMIN — MOMETASONE FUROATE AND FORMOTEROL FUMARATE DIHYDRATE 2 PUFF: 100; 5 AEROSOL RESPIRATORY (INHALATION) at 21:33

## 2019-05-08 RX ADMIN — TRAMADOL HYDROCHLORIDE 50 MG: 50 TABLET, FILM COATED ORAL at 08:12

## 2019-05-08 RX ADMIN — DICYCLOMINE HYDROCHLORIDE 20 MG: 20 TABLET ORAL at 13:16

## 2019-05-08 RX ADMIN — MOMETASONE FUROATE AND FORMOTEROL FUMARATE DIHYDRATE 2 PUFF: 100; 5 AEROSOL RESPIRATORY (INHALATION) at 07:38

## 2019-05-08 RX ADMIN — TRAMADOL HYDROCHLORIDE 50 MG: 50 TABLET, FILM COATED ORAL at 17:08

## 2019-05-08 RX ADMIN — QUETIAPINE FUMARATE 50 MG: 50 TABLET ORAL at 20:51

## 2019-05-08 RX ADMIN — CLONAZEPAM 0.25 MG: 0.5 TABLET ORAL at 08:12

## 2019-05-08 RX ADMIN — FLUOXETINE 30 MG: 20 CAPSULE ORAL at 08:12

## 2019-05-08 RX ADMIN — DICYCLOMINE HYDROCHLORIDE 20 MG: 20 TABLET ORAL at 20:51

## 2019-05-08 RX ADMIN — DICYCLOMINE HYDROCHLORIDE 20 MG: 20 TABLET ORAL at 17:09

## 2019-05-08 RX ADMIN — CLONAZEPAM 0.25 MG: 0.5 TABLET ORAL at 20:51

## 2019-05-08 RX ADMIN — DICYCLOMINE HYDROCHLORIDE 20 MG: 20 TABLET ORAL at 08:13

## 2019-05-08 ASSESSMENT — PAIN SCALES - GENERAL
PAINLEVEL_OUTOF10: 5
PAINLEVEL_OUTOF10: 6
PAINLEVEL_OUTOF10: 4
PAINLEVEL_OUTOF10: 5

## 2019-05-08 NOTE — GROUP NOTE
Group Therapy Note    Date: May 8    Group Start Time: 1440  Group End Time: 1510  Group Topic: Cognitive Skills    [facility] 3W I    Dennis Runner, LISW        Group Therapy Note             Patient's Goal:  To participate in mood management group. Notes:  Patient learned to set specific goals. Status After Intervention:  Improved    Participation Level: Active Listener    Participation Quality: Appropriate      Speech:  normal      Thought Process/Content: Logical      Affective Functioning: Congruent      Mood: depressed      Level of consciousness:  Alert      Response to Learning: Able to verbalize current knowledge/experience      Endings: None Reported    Modes of Intervention: Education      Discipline Responsible: /Counselor      Signature:   Dennis Runner, LISW

## 2019-05-08 NOTE — PROGRESS NOTES
PT visible in milieu. Social with select peers. Attending groups. Brighter affect. Mood continues worried about future but improving. Denies S/I.H/I/, A/V . Asking questions about outpatient IOP program and verbalizes an interest in potentially doing this.

## 2019-05-08 NOTE — PROGRESS NOTES
BEHAVIORAL HEALTH FOLLOW-UP NOTE     5/8/2019     Patient was seen and examined in person, Chart reviewed   Patient's case discussed with staff/team    Chief Complaint: Depression    Interim History:     Pt is feeling less depressed  Less anxious since starting klonopin  Still has some helpless feeling  Pt is not having any active SI  Feel poorly motivated lacking interest    Appetite:   [] Normal/Unchanged  [] Increased  [x] Decreased      Sleep:       [] Normal/Unchanged  [x] Fair       [] Poor              Energy:    [] Normal/Unchanged  [] Increased  [x] Decreased        SI [] Present  [x] Absent    HI  []Present  [x] Absent     Aggression:  [] yes  [x] no    Patient is [] able  [] unable to CONTRACT FOR SAFETY     PAST MEDICAL/PSYCHIATRIC HISTORY:   Past Medical History:   Diagnosis Date    Asthma     Chronic back pain greater than 3 months duration 2012    Dr Emily Zimmerman COPD (chronic obstructive pulmonary disease) ([facility] 2017    Dr Andrea Hughes    Depression 2010    Dr Marquis Metzger [facility]), hospital admissions, counseling Judson Lai    Family history of malignant neoplasm of breast     H/O vitamin D deficiency     Invasive ductal carcinoma of left breast ([facility] 05/2018    [id number] ERPR+ her2-, radiation, no chemo, Dr Carmen Holden       FAMILY/SOCIAL HISTORY:  Family History   Problem Relation Age of Onset    Heart Failure Mother         dec 80    Osteoarthritis Mother     Cancer Mother 61        breast     Breast Cancer Mother     Heart Attack Father         dec 58    Hypertension Father     High Cholesterol Father     Stroke Sister     No Known Problems Son      Social History     Socioeconomic History    Marital status: Single     Spouse name: Not on file    Number of children: 2    Years of education: Not on file    Highest education level: Not on file   Occupational History    Occupation: , now on [address] resource strain: Not on file   Romayne Hoffman Food insecurity:     Worry: Not on file     Inability: Not on file    Transportation needs:     Medical: Not on file     Non-medical: Not on file   Tobacco Use    Smoking status: Former Smoker     Packs/day: 1.00     Years: 20.00     Pack years: 20.00     Types: Cigarettes     Last attempt to quit: 2017     Years since quittin.4    Smokeless tobacco: Never Used   Substance and Sexual Activity    Alcohol use: Yes     Alcohol/week: 0.0 oz     Comment: social    Drug use: No    Sexual activity: Yes   Lifestyle    Physical activity:     Days per week: Not on file     Minutes per session: Not on file    Stress: Not on file   Relationships    Social connections:     Talks on phone: Not on file     Gets together: Not on file     Attends Mu-ism service: Not on file     Active member of club or organization: Not on file     Attends meetings of clubs or organizations: Not on file     Relationship status: Not on file    Intimate partner violence:     Fear of current or ex partner: Not on file     Emotionally abused: Not on file     Physically abused: Not on file     Forced sexual activity: Not on file   Other Topics Concern    Not on file   Social History Narrative    Born in [facility], one of 3 siblings, 2 half sisters (does not talk to them since )    Single, , 2 sons, in [place] and [address] [place] [address] 5, does some babysitting    Lives in a mobil home in [facility] [place] alone    Worked as a  x 15 years    [place] 5, attends [employer]     On Medicare for depression x            ROS:  [x] All negative/unchanged except if checked.  Explain positive(checked items) below:  [] Constitutional  [] Eyes  [] Ear/Nose/Mouth/Throat  [] Respiratory  [] CV  [] GI  []   [] Musculoskeletal  [] Skin/Breast  [] Neurological  [] Endocrine  [] Heme/Lymph  [] Allergic/Immunologic    Explanation:     MEDICATIONS:    Current Facility-Administered Medications:     clonazePAM (KLONOPIN) tablet 0.25 mg, 0.25 mg, Oral, BID, Asher Sepulveda MD, 0.25 mg at 05/08/19 0812    FLUoxetine (PROZAC) capsule 30 mg, 30 mg, Oral, Daily, Asher Sepulveda MD, 30 mg at 05/08/19 4042    hydrOXYzine (VISTARIL) capsule 50 mg, 50 mg, Oral, Q6H PRN **OR** hydrOXYzine (ATARAX) tablet 50 mg, 50 mg, Oral, Q6H PRN, Asher Sepulveda MD, 50 mg at 05/06/19 1335    traMADol (ULTRAM) tablet 50 mg, 50 mg, Oral, Q6H PRN, Kolby Gallardo MD, 50 mg at 05/08/19 0812    albuterol (PROVENTIL) nebulizer solution 2.5 mg, 2.5 mg, Nebulization, Q6H PRN, MAURICIO Begum CNP    albuterol sulfate  (90 Base) MCG/ACT inhaler 2 puff, 2 puff, Inhalation, Q6H PRN, MAURICIO Solis CNP    mometasone-formoterol (DULERA) 100-5 MCG/ACT inhaler 2 puff, 2 puff, Inhalation, BID, MAURICIO Begum CNP, 2 puff at 05/08/19 0738    lidocaine 4 % external patch 1 patch, 1 patch, Transdermal, Daily, MAURICIO Begum CNP, 1 patch at 05/07/19 2048    therapeutic multivitamin-minerals 1 tablet, 1 tablet, Oral, Daily, MAURICIO Begum CNP, 1 tablet at 05/08/19 4593    QUEtiapine (SEROQUEL) tablet 50 mg, 50 mg, Oral, Nightly, Jason Gallardo MD, 50 mg at 05/07/19 2038    acetaminophen (TYLENOL) tablet 650 mg, 650 mg, Oral, Q4H PRN, Kolby Gallardo MD    magnesium hydroxide (MILK OF MAGNESIA) 400 MG/5ML suspension 30 mL, 30 mL, Oral, Daily PRN, Jason Gallardo MD    haloperidol (HALDOL) tablet 5 mg, 5 mg, Oral, Q6H PRN **OR** haloperidol lactate (HALDOL) injection 5 mg, 5 mg, Intramuscular, Q6H PRN, Jason Gallardo MD    traZODone (DESYREL) tablet 50 mg, 50 mg, Oral, Nightly PRN, Kolby Gallardo MD    ondansetron (ZOFRAN-ODT) disintegrating tablet 4 mg, 4 mg, Oral, Q8H PRN, Destiny Freire PA-C, 4 mg at 05/04/19 2224    dicyclomine (BENTYL) injection 20 mg, 20 mg, Intramuscular, 4x Daily **OR** dicyclomine (BENTYL) tablet 20 mg, 20 mg, Oral, 4x Daily, Destiny Freire PA-C, 20 mg at 05/08/19 1316      Examination:  /68   Pulse 82   Temp 98 °F (36.7 °C) (Oral)   Resp 18   Ht 5' 4\" (1.626 m)   Wt 160 lb (72.6 kg)   LMP 05/08/2014   SpO2 96%   BMI 27.46 kg/m²   Gait - steady  Medication side effects(SE): no    Mental Status Examination:    Level of consciousness:  within normal limits   Appearance:  fair grooming and fair hygiene  Behavior/Motor:  Less psychomotor retardation  Attitude toward examiner:  cooperative  Speech:  slow   Mood: depressed  Affect:  anxious  Thought processes:  slow   Thought content:  Suicidal Ideation:  denies suicidal ideation  Delusions:  no evidence of delusions  Perceptual Disturbance:  denies any perceptual disturbance  Cognition:  oriented to person, place, and time   Concentration distractible  Insight fair   Judgement fair     ASSESSMENT:   Patient symptoms are:  [] Well controlled  [x] Improving  [] Worsening  [] No change      Diagnosis:   Active Problems:    Current episode of major depressive disorder without prior episode    Hyperglycemia  Resolved Problems:    * No resolved hospital problems. *      LABS:    No results for input(s): WBC, HGB, PLT in the last 72 hours. No results for input(s): NA, K, CL, CO2, BUN, CREATININE, GLUCOSE in the last 72 hours. No results for input(s): BILITOT, ALKPHOS, AST, ALT in the last 72 hours. Lab Results   Component Value Date    LABAMPH Neg 05/03/2019    BARBSCNU Neg 05/03/2019    LABBENZ Neg 05/03/2019    OPIATESCREENURINE Neg 05/03/2019    PHENCYCLIDINESCREENURINE Neg 05/03/2019    ETOH <10 05/03/2019     Lab Results   Component Value Date    TSH 1.590 05/03/2019     No results found for: LITHIUM  No results found for: VALPROATE, CBMZ    RISK ASSESSMENT:     Treatment Plan:  Reviewed current Medications with the patient. Medication as ordered  Klonopin as ordered  Risks, benefits, side effects, drug-to-drug interactions and alternatives to treatment were discussed.   Collateral information: pending  CD evaluation  Encourage patient to attend group and other milieu activities.   Discharge planning discussed with the patient and treatment team.    PSYCHOTHERAPY/COUNSELING:  [x] Therapeutic interview  [x] Supportive  [] CBT  [] Ongoing  [] Other    [x] Patient continues to need, on a daily basis, active treatment furnished directly by or requiring the supervision of inpatient psychiatric personnel      Anticipated Length of stay:            Electronically signed by Ruthann Cortez MD on 5/8/2019 at 1:34 PM

## 2019-05-08 NOTE — GROUP NOTE
Group Therapy Note    Date: May 7    Group Start Time: 2045  Group End Time: 2100  Group Topic: Wrap-Up    [facility] 3W BELKIS Turner        Group Therapy Note    Attendees: 3    Pt did not attend group.

## 2019-05-08 NOTE — PROGRESS NOTES
Pt denies current SI/HI or AVH. Patient dep and anx 4/10. Patient is brighter today, out on the unit. Pt looks around but is rarely seen socializing with peers. Pt drinking plenty of water refilling pitcher often. Pt is seen eating with peers and talking at lunch. Pt states she has not felt that anxious feeling inside since stopping the Buspar. Pt continues to take the Klonopin as prescirbed without the feeling of anxiety that she had with the Buspar.

## 2019-05-08 NOTE — GROUP NOTE
Group Therapy Note    Date: May 7    Group Start Time: 1950  Group End Time: 2045  Group Topic: Recreational    [facility]    Marialuisa Fischer        Group Therapy Note    Attendees: 3    Pt did not attend group.

## 2019-05-09 PROCEDURE — 1240000000 HC EMOTIONAL WELLNESS R&B

## 2019-05-09 PROCEDURE — 6370000000 HC RX 637 (ALT 250 FOR IP): Performed by: NURSE PRACTITIONER

## 2019-05-09 PROCEDURE — 90833 PSYTX W PT W E/M 30 MIN: CPT | Performed by: PSYCHIATRY & NEUROLOGY

## 2019-05-09 PROCEDURE — 6370000000 HC RX 637 (ALT 250 FOR IP): Performed by: PHYSICIAN ASSISTANT

## 2019-05-09 PROCEDURE — 94640 AIRWAY INHALATION TREATMENT: CPT

## 2019-05-09 PROCEDURE — 6370000000 HC RX 637 (ALT 250 FOR IP): Performed by: PSYCHIATRY & NEUROLOGY

## 2019-05-09 PROCEDURE — 99231 SBSQ HOSP IP/OBS SF/LOW 25: CPT | Performed by: PSYCHIATRY & NEUROLOGY

## 2019-05-09 RX ADMIN — MULTIPLE VITAMINS W/ MINERALS TAB 1 TABLET: TAB at 08:58

## 2019-05-09 RX ADMIN — DICYCLOMINE HYDROCHLORIDE 20 MG: 20 TABLET ORAL at 12:51

## 2019-05-09 RX ADMIN — CLONAZEPAM 0.25 MG: 0.5 TABLET ORAL at 11:06

## 2019-05-09 RX ADMIN — FLUOXETINE 30 MG: 20 CAPSULE ORAL at 08:58

## 2019-05-09 RX ADMIN — TRAMADOL HYDROCHLORIDE 50 MG: 50 TABLET, FILM COATED ORAL at 09:00

## 2019-05-09 RX ADMIN — DICYCLOMINE HYDROCHLORIDE 20 MG: 20 TABLET ORAL at 16:22

## 2019-05-09 RX ADMIN — DICYCLOMINE HYDROCHLORIDE 20 MG: 20 TABLET ORAL at 08:58

## 2019-05-09 RX ADMIN — MOMETASONE FUROATE AND FORMOTEROL FUMARATE DIHYDRATE 2 PUFF: 100; 5 AEROSOL RESPIRATORY (INHALATION) at 08:38

## 2019-05-09 RX ADMIN — TRAMADOL HYDROCHLORIDE 50 MG: 50 TABLET, FILM COATED ORAL at 16:22

## 2019-05-09 RX ADMIN — DICYCLOMINE HYDROCHLORIDE 20 MG: 20 TABLET ORAL at 20:43

## 2019-05-09 RX ADMIN — QUETIAPINE FUMARATE 50 MG: 50 TABLET ORAL at 20:43

## 2019-05-09 RX ADMIN — MOMETASONE FUROATE AND FORMOTEROL FUMARATE DIHYDRATE 2 PUFF: 100; 5 AEROSOL RESPIRATORY (INHALATION) at 20:46

## 2019-05-09 RX ADMIN — CLONAZEPAM 0.25 MG: 0.5 TABLET ORAL at 20:41

## 2019-05-09 ASSESSMENT — PAIN SCALES - GENERAL
PAINLEVEL_OUTOF10: 6
PAINLEVEL_OUTOF10: 6

## 2019-05-09 NOTE — FLOWSHEET NOTE
Pt out on unit eating breakfast; pt social with select few; pt c/o pain back and Left breast, medicated pt with Ultram 50 mg PO (see MAR).

## 2019-05-09 NOTE — PROGRESS NOTES
[facility] FOLLOW-UP NOTE     5/9/2019     Patient was seen and examined in person, Chart reviewed   Patient's case discussed with staff/team    Chief Complaint: Depression    Interim History:     Pt has been doing better  Less depressed  Less anxious  Pt is still not sure if she wants to come to [facility] or other program  Pt denies active SI  Appetite:   [] Normal/Unchanged  [] Increased  [x] Decreased      Sleep:       [] Normal/Unchanged  [x] Fair       [] Poor              Energy:    [] Normal/Unchanged  [] Increased  [x] Decreased        SI [] Present  [x] Absent    HI  []Present  [x] Absent     Aggression:  [] yes  [x] no    Patient is [] able  [] unable to CONTRACT FOR SAFETY     PAST MEDICAL/PSYCHIATRIC HISTORY:   Past Medical History:   Diagnosis Date    Asthma     Chronic back pain greater than 3 months duration 2012    Dr Fatou Kinney COPD (chronic obstructive pulmonary disease) ([facility] 2017    Dr Benita White    Depression 2010    Dr Restrepo [facility]), hospital admissions, counseling [place]    Family history of malignant neoplasm of breast     H/O vitamin D deficiency     Invasive ductal carcinoma of left breast ([facility] 05/2018    T1N0M0 ERPR+ her2-, radiation, no chemo, Dr Cathy Romero       FAMILY/SOCIAL HISTORY:  Family History   Problem Relation Age of Onset    Heart Failure Mother         dec 80    Osteoarthritis Mother     Cancer Mother 61        breast     Breast Cancer Mother     Heart Attack Father         dec 58    Hypertension Father     High Cholesterol Father     Stroke Sister     No Known Problems Son      Social History     Socioeconomic History    Marital status: Single     Spouse name: Not on file    Number of children: 2    Years of education: Not on file    Highest education level: Not on file   Occupational History    Occupation: , now on [address] resource strain: Not on file    Food insecurity:     Worry: Not on file     Inability: Not on file    Transportation needs:     Medical: Not on file     Non-medical: Not on file   Tobacco Use    Smoking status: Former Smoker     Packs/day: 1.00     Years: 20.00     Pack years: 20.00     Types: Cigarettes     Last attempt to quit: 2017     Years since quittin.4    Smokeless tobacco: Never Used   Substance and Sexual Activity    Alcohol use: Yes     Alcohol/week: 0.0 oz     Comment: social    Drug use: No    Sexual activity: Yes   Lifestyle    Physical activity:     Days per week: Not on file     Minutes per session: Not on file    Stress: Not on file   Relationships    Social connections:     Talks on phone: Not on file     Gets together: Not on file     Attends Advent service: Not on file     Active member of club or organization: Not on file     Attends meetings of clubs or organizations: Not on file     Relationship status: Not on file    Intimate partner violence:     Fear of current or ex partner: Not on file     Emotionally abused: Not on file     Physically abused: Not on file     Forced sexual activity: Not on file   Other Topics Concern    Not on file   Social History Narrative    Born in [place], one of 3 siblings, 2 half sisters (does not talk to them since )    Single, , 2 sons, in [place] and [address] [place] [address] 5, does some babysitting    Lives in a mobil home in [place] alone    Worked as a  x 15 years    [employer], attends [employer]     On Medicare for depression x            ROS:  [x] All negative/unchanged except if checked.  Explain positive(checked items) below:  [] Constitutional  [] Eyes  [] Ear/Nose/Mouth/Throat  [] Respiratory  [] CV  [] GI  []   [] Musculoskeletal  [] Skin/Breast  [] Neurological  [] Endocrine  [] Heme/Lymph  [] Allergic/Immunologic    Explanation:     MEDICATIONS:    Current Facility-Administered Medications:     clonazePAM (KLONOPIN) tablet 0.25 mg, 0.25 mg, Oral, BID, Gissel Rocha MD, 0.25 mg at 05/09/19 1106    FLUoxetine (PROZAC) capsule 30 mg, 30 mg, Oral, Daily, Edinson Sullivan MD, 30 mg at 05/09/19 0858    hydrOXYzine (VISTARIL) capsule 50 mg, 50 mg, Oral, Q6H PRN **OR** hydrOXYzine (ATARAX) tablet 50 mg, 50 mg, Oral, Q6H PRN, Edinson Sullivan MD, 50 mg at 05/06/19 1335    traMADol (ULTRAM) tablet 50 mg, 50 mg, Oral, Q6H PRN, Jason Gallardo MD, 50 mg at 05/09/19 0900    albuterol (PROVENTIL) nebulizer solution 2.5 mg, 2.5 mg, Nebulization, Q6H PRN, MAURICIO Penny - CNP    albuterol sulfate  (90 Base) MCG/ACT inhaler 2 puff, 2 puff, Inhalation, Q6H PRN, MAURICIO Penny CNP    mometasone-formoterol (DULERA) 100-5 MCG/ACT inhaler 2 puff, 2 puff, Inhalation, BID, MAURICIO Penny CNP, 2 puff at 05/09/19 0838    lidocaine 4 % external patch 1 patch, 1 patch, Transdermal, Daily, MAURICIO Penny CNP, 1 patch at 05/08/19 2126    therapeutic multivitamin-minerals 1 tablet, 1 tablet, Oral, Daily, MAURICIO Penny CNP, 1 tablet at 05/09/19 0858    QUEtiapine (SEROQUEL) tablet 50 mg, 50 mg, Oral, Nightly, Jason Gallardo MD, 50 mg at 05/08/19 2051    acetaminophen (TYLENOL) tablet 650 mg, 650 mg, Oral, Q4H PRN, Anil Gallardo MD    magnesium hydroxide (MILK OF MAGNESIA) 400 MG/5ML suspension 30 mL, 30 mL, Oral, Daily PRN, Jason Gallardo MD    haloperidol (HALDOL) tablet 5 mg, 5 mg, Oral, Q6H PRN **OR** haloperidol lactate (HALDOL) injection 5 mg, 5 mg, Intramuscular, Q6H PRN, Jason Gallardo MD    traZODone (DESYREL) tablet 50 mg, 50 mg, Oral, Nightly PRN, Jamesord Salazar Gallardo MD    ondansetron (ZOFRAN-ODT) disintegrating tablet 4 mg, 4 mg, Oral, Q8H PRN, Zully Mendieta PA-C, 4 mg at 05/04/19 2224    dicyclomine (BENTYL) injection 20 mg, 20 mg, Intramuscular, 4x Daily **OR** dicyclomine (BENTYL) tablet 20 mg, 20 mg, Oral, 4x Daily, Destiny Freire PA-C, 20 mg at 05/09/19 0858      Examination:  BP 90/62   Pulse 75   Temp 97 °F (36.1 °C) (Oral)   Resp 18   Ht 5' 4\" (1.626 m)   Wt 160 lb (72.6 kg)   LMP 05/08/2014   SpO2 95%   BMI 27.46 kg/m²   Gait - steady  Medication side effects(SE): no    Mental Status Examination:    Level of consciousness:  within normal limits   Appearance:  fair grooming and fair hygiene  Behavior/Motor:  Less psychomotor retardation  Attitude toward examiner:  cooperative  Speech:  slow   Mood: less depressed  Affect:  anxious  Thought processes:  slow   Thought content:  Suicidal Ideation:  denies suicidal ideation  Delusions:  no evidence of delusions  Perceptual Disturbance:  denies any perceptual disturbance  Cognition:  oriented to person, place, and time   Concentration distractible  Insight fair   Judgement fair     ASSESSMENT:   Patient symptoms are:  [] Well controlled  [x] Improving  [] Worsening  [] No change      Diagnosis:   Active Problems:    Current episode of major depressive disorder without prior episode    Hyperglycemia  Resolved Problems:    * No resolved hospital problems. *      LABS:    No results for input(s): WBC, HGB, PLT in the last 72 hours. No results for input(s): NA, K, CL, CO2, BUN, CREATININE, GLUCOSE in the last 72 hours. No results for input(s): BILITOT, ALKPHOS, AST, ALT in the last 72 hours. Lab Results   Component Value Date    LABAMPH Neg 05/03/2019    BARBSCNU Neg 05/03/2019    LABBENZ Neg 05/03/2019    OPIATESCREENURINE Neg 05/03/2019    PHENCYCLIDINESCREENURINE Neg 05/03/2019    ETOH <10 05/03/2019     Lab Results   Component Value Date    TSH 1.590 05/03/2019     No results found for: LITHIUM  No results found for: VALPROATE, CBMZ    RISK ASSESSMENT:     Treatment Plan:  Reviewed current Medications with the patient. Medication as ordered  Risks, benefits, side effects, drug-to-drug interactions and alternatives to treatment were discussed.   Collateral information: pending  CD evaluation  Encourage patient to attend group and other milieu activities.   Discharge planning discussed with the patient and treatment team.    PSYCHOTHERAPY/COUNSELING:  [x] Therapeutic interview  [x] Supportive  [] CBT  [] Ongoing  [] Other  Patient was seen 1:1 for 20 minutes, other than E&M time spent, focusing on      - coping skills techniques     - Anxiety management techniques discussed including deep breathing exercise and PMR     - discussing patients strength and weakness      - Focusing on negative cognition and maladaptive thoughts, which is feeding and maintaining the depression symptoms       [x] Patient continues to need, on a daily basis, active treatment furnished directly by or requiring the supervision of inpatient psychiatric personnel      Anticipated Length of stay:            Electronically signed by Criss Ness MD on 5/9/2019 at 11:36 AM

## 2019-05-09 NOTE — GROUP NOTE
Group Therapy Note    Date: May 8    Group Start Time: 2055  Group End Time: 2115  Group Topic: Wrap-Up    [facility]    Josh Chua        Group Therapy Note    Attendees: 10      Pt shared with peers. Status After Intervention:  Improved    Participation Level:  Active Listener and Interactive    Participation Quality: Appropriate and Attentive      Speech:  normal      Thought Process/Content: Logical      Affective Functioning: Congruent      Mood: euthymic      Level of consciousness:  Alert, Oriented x4 and Attentive      Response to Learning: Able to verbalize current knowledge/experience and Progressing to goal      Endings: None Reported    Modes of Intervention: Support and Socialization      Discipline Responsible: Behavorial Health Tech      Signature:  Josh Chua

## 2019-05-09 NOTE — FLOWSHEET NOTE
Pt c/o anxiety 5/10 and depression 4/10; Pt requested her Klonopin, Klonopin 0.25mg tablet given to pt (see MAR); pt attended 1000 group; pt states she is feeling a little better and that her medication Klonopin helps with her anxiety; pt denies S/I and H/I at this time.

## 2019-05-09 NOTE — GROUP NOTE
Group Therapy Note    Date: May 9    Group Start Time: 1115  Group End Time: 1200  Group Topic: Psychotherapy    [facility] Valles, [facility]        Group Therapy Note    Attendees: 10         Patient's Goal: wants to attend outpt. Notes:  Patient would like to speak to staff that runs outpt.      Status After Intervention:  Improved    Participation Level: Interactive    Participation Quality: Appropriate      Speech:  normal      Thought Process/Content: Logical      Affective Functioning: Congruent      Mood: anxious      Level of consciousness:  Alert      Response to Learning: Progressing to goal      Endings: None Reported    Modes of Intervention: Support      Discipline Responsible: /Counselor      Signature:  Bashir Valles, [facility]

## 2019-05-09 NOTE — PROGRESS NOTES
Pt. attended the 0900 community meeting. Electronically signed by Jaylin Long, [address] on 5/9/2019 at 2:06 PM

## 2019-05-09 NOTE — GROUP NOTE
Group Therapy Note    Date: May 9    Group Start Time: 1000  Group End Time: 1100  Group Topic: Psychoeducation    [facility]    Lorrie Campbell        Group Therapy Note             Patient's Goal:  \"To feel better\"    Notes:  \"Patient was quiet and kept to herself in group. She work fairly on her task. Status After Intervention:  Unchanged    Participation Level:  Active Listener    Participation Quality: Appropriate and Attentive      Speech:  quiet      Thought Process/Content: Linear      Affective Functioning: Congruent      Mood: calm      Level of consciousness:  Alert      Response to Learning: Progressing to goal      Endings: None Reported    Modes of Intervention: Education, Socialization and Activity      Discipline Responsible: Psychoeducational Specialist      Signature:  Ria Snellen

## 2019-05-10 VITALS
OXYGEN SATURATION: 95 % | RESPIRATION RATE: 18 BRPM | DIASTOLIC BLOOD PRESSURE: 75 MMHG | BODY MASS INDEX: 27.31 KG/M2 | WEIGHT: 160 LBS | SYSTOLIC BLOOD PRESSURE: 113 MMHG | TEMPERATURE: 97 F | HEART RATE: 99 BPM | HEIGHT: 64 IN

## 2019-05-10 PROCEDURE — 6370000000 HC RX 637 (ALT 250 FOR IP): Performed by: NURSE PRACTITIONER

## 2019-05-10 PROCEDURE — 94640 AIRWAY INHALATION TREATMENT: CPT

## 2019-05-10 PROCEDURE — 99239 HOSP IP/OBS DSCHRG MGMT >30: CPT | Performed by: PSYCHIATRY & NEUROLOGY

## 2019-05-10 PROCEDURE — 6370000000 HC RX 637 (ALT 250 FOR IP): Performed by: PSYCHIATRY & NEUROLOGY

## 2019-05-10 PROCEDURE — 6370000000 HC RX 637 (ALT 250 FOR IP): Performed by: PHYSICIAN ASSISTANT

## 2019-05-10 RX ORDER — QUETIAPINE FUMARATE 50 MG/1
50 TABLET, FILM COATED ORAL NIGHTLY
Qty: 30 TABLET | Refills: 1 | Status: SHIPPED | OUTPATIENT
Start: 2019-05-10 | End: 2020-01-17

## 2019-05-10 RX ORDER — CLONAZEPAM 0.5 MG/1
0.25 TABLET ORAL 2 TIMES DAILY
Qty: 14 TABLET | Refills: 0 | Status: SHIPPED | OUTPATIENT
Start: 2019-05-10 | End: 2020-01-17

## 2019-05-10 RX ORDER — FLUOXETINE 10 MG/1
CAPSULE ORAL
Qty: 30 CAPSULE | Refills: 1 | Status: SHIPPED | OUTPATIENT
Start: 2019-05-10 | End: 2020-01-17

## 2019-05-10 RX ADMIN — MULTIPLE VITAMINS W/ MINERALS TAB 1 TABLET: TAB at 08:46

## 2019-05-10 RX ADMIN — DICYCLOMINE HYDROCHLORIDE 20 MG: 20 TABLET ORAL at 12:49

## 2019-05-10 RX ADMIN — MOMETASONE FUROATE AND FORMOTEROL FUMARATE DIHYDRATE 2 PUFF: 100; 5 AEROSOL RESPIRATORY (INHALATION) at 08:16

## 2019-05-10 RX ADMIN — CLONAZEPAM 0.25 MG: 0.5 TABLET ORAL at 12:50

## 2019-05-10 RX ADMIN — FLUOXETINE 30 MG: 20 CAPSULE ORAL at 08:46

## 2019-05-10 RX ADMIN — DICYCLOMINE HYDROCHLORIDE 20 MG: 20 TABLET ORAL at 08:46

## 2019-05-10 RX ADMIN — TRAMADOL HYDROCHLORIDE 50 MG: 50 TABLET, FILM COATED ORAL at 08:46

## 2019-05-10 ASSESSMENT — PAIN SCALES - GENERAL: PAINLEVEL_OUTOF10: 4

## 2019-05-10 NOTE — PROGRESS NOTES
Pt denies SI/HI or AVH. Patient states she is feeling better. Pt anxiety is better controlled. Pt is found in her room, pt states she is trying to meditate instead of go to art therapy. Patient also has this nurse hold her am klonopin until later she will let staff know. Patient sleep better and appetite is good. Patient seen social with select peers.  Electronically signed by Clara Orozco LPN on 2/81/1462 at 31:41 AM

## 2019-05-10 NOTE — PROGRESS NOTES
Pt. attended the 0900 community meeting.  Electronically signed by Sarai Contreras on 5/10/2019 at 9:44 AM

## 2019-05-10 NOTE — DISCHARGE SUMMARY
DISCHARGE SUMMARY      Patient ID:  Alicia Ruggiero  [id number]  88 y.o.  1965    Admit date: 5/3/2019    Discharge date and time: 5/10/2019    Admitting Physician: Aleida Hu MD     Discharge Physician: Dr Sheri Torres MD    Admission Diagnoses: Current episode of major depressive disorder without prior episode, unspecified depression episode severity [F32.9]    Admission Condition: poor    Discharged Condition: stable    Admission Circumstance:     Wants to join her  parents  Son was very concerned about patient's presentation  Has been experiencing insomnia for 1 month  Racing thoughts  guilt  Severe stressors  Just found out she has breast cancer in lieu of that tried two chemotherapy agents which gave her serious side effects, so she stopped chemotherapy and is not willing to go on another agent.   Youngest son moved to [facility]          PAST MEDICAL/PSYCHIATRIC HISTORY:   Past Medical History:   Diagnosis Date    Asthma     Chronic back pain greater than 3 months duration     Dr Neto Sheppard COPD (chronic obstructive pulmonary disease) [facility])     Dr Tona Glover     Dr Deal [facility]), hospital admissions, counseling Sandra Espana    Family history of malignant neoplasm of breast     H/O vitamin D deficiency     Invasive ductal carcinoma of left breast ([facility] 2018    T1N0M0 ERPR+ her2-, radiation, no chemo, Dr Senthil Werner       FAMILY/SOCIAL HISTORY:  Family History   Problem Relation Age of Onset    Heart Failure Mother         dec 80    Osteoarthritis Mother     Cancer Mother 61        breast     Breast Cancer Mother     Heart Attack Father         dec 58    Hypertension Father     High Cholesterol Father     Stroke Sister     No Known Problems Son      Social History     Socioeconomic History    Marital status: Single     Spouse name: Not on file    Number of children: 2    Years of education: Not on file    Highest education level: Not on file   Occupational History    Occupation: , now on [address] resource strain: Not on file    Food insecurity:     Worry: Not on file     Inability: Not on file   AdventEnna needs:     Medical: Not on file     Non-medical: Not on file   Tobacco Use    Smoking status: Former Smoker     Packs/day: 1.00     Years: 20.00     Pack years: 20.00     Types: Cigarettes     Last attempt to quit: 2017     Years since quittin.4    Smokeless tobacco: Never Used   Substance and Sexual Activity    Alcohol use:  Yes     Alcohol/week: 0.0 oz     Comment: social    Drug use: No    Sexual activity: Yes   Lifestyle    Physical activity:     Days per week: Not on file     Minutes per session: Not on file    Stress: Not on file   Relationships    Social connections:     Talks on phone: Not on file     Gets together: Not on file     Attends Religion service: Not on file     Active member of club or organization: Not on file     Attends meetings of clubs or organizations: Not on file     Relationship status: Not on file    Intimate partner violence:     Fear of current or ex partner: Not on file     Emotionally abused: Not on file     Physically abused: Not on file     Forced sexual activity: Not on file   Other Topics Concern    Not on file   Social History Narrative    Born in [place], one of 3 siblings, 2 half sisters (does not talk to them since )    Single, , 2 sons, in [place] [facility] 5, does some babysitting    Lives in a mobil home in [place] alone    Worked as a  x 15 years    [place] 5, attends [employer]     On Medicare for depression x        MEDICATIONS:    Current Facility-Administered Medications:     clonazePAM (KLONOPIN) tablet 0.25 mg, 0.25 mg, Oral, BID, Ap Johnson MD, 0.25 mg at 19    FLUoxetine (PROZAC) capsule 30 mg, 30 mg, Oral, Daily, Ap Johnson MD, 30 mg at 05/10/19 SpO2 95%   BMI 27.46 kg/m²   Gait - steady    HOSPITAL COURSE[de-identified]  Following admission to the hospital, patient had a complete physical exam and blood work up  Patient was monitored closely with suicide precaution  Patient was started on medication as listed below  Was encouraged to participate in group and other milieu activity  Patient started to feel better with this combination of treatment. Significant progress in the symptoms since admission. Mood better, with the score of 2/10 - bad  No AVH or paranoid thoughts  No Hopeless or worthless feeling  No active SI/HI  Appetite:  [x] Normal  [] Increased  [] Decreased    Sleep:       [x] Normal  [] Fair       [] Poor            Energy:    [x] Normal  [] Increased  [] Decreased     SI [] Present  [x] Absent  HI  []Present  [x] Absent   Aggression:  [] yes  [] no  Patient is [x] able  [] unable to CONTRACT FOR SAFETY   Medication side effects(SE):  [x] None(Psych. Meds.) [] Other      Mental Status Examination on discharge:    Level of consciousness:  within normal limits   Appearance:  well-appearing  Behavior/Motor:  no abnormalities noted  Attitude toward examiner:  attentive and good eye contact  Speech:  spontaneous, normal rate and normal volume   Mood: anxious  Affect:  mood congruent  Thought processes:  linear and goal directed   Thought content:  Suicidal Ideation:  denies suicidal ideation  Delusions:  no evidence of delusions  Perceptual Disturbance:  denies any perceptual disturbance  Cognition:  oriented to person, place, and time   Concentration intact  Memory intact  Insight good   Judgement fair   Fund of Knowledge adequate      ASSESSMENT:  Patient symptoms are:  [] Well controlled  [x] Improving  [] Worsening  [] No change      Diagnosis:  Active Problems:    Current episode of major depressive disorder without prior episode    Hyperglycemia  Resolved Problems:    * No resolved hospital problems.  *      LABS:    No results for input(s): WBC, HGB, PLT in the last 72 hours. No results for input(s): NA, K, CL, CO2, BUN, CREATININE, GLUCOSE in the last 72 hours. No results for input(s): BILITOT, ALKPHOS, AST, ALT in the last 72 hours. Lab Results   Component Value Date    LABAMPH Neg 05/03/2019    BARBSCNU Neg 05/03/2019    LABBENZ Neg 05/03/2019    OPIATESCREENURINE Neg 05/03/2019    PHENCYCLIDINESCREENURINE Neg 05/03/2019    ETOH <10 05/03/2019     Lab Results   Component Value Date    TSH 1.590 05/03/2019     No results found for: LITHIUM  No results found for: VALPROATE, CBMZ    RISK ASSESSMENT AT DISCHARGE: Low risk for suicide and homicide. Treatment Plan:  Reviewed current Medications with the patient. Education provided on the complaince with treatment. Risks, benefits, side effects, drug-to-drug interactions and alternatives to treatment were discussed. Encourage patient to attend outpatient follow up appointment and therapy. Patient was advised to call the outpatient provider, visit the nearest ED or call 911 if symptoms are not manageable. Patient's family member was contacted prior to the discharge.         Medication List      START taking these medications    clonazePAM 0.5 MG tablet  Commonly known as:  KLONOPIN  Take 0.5 tablets by mouth 2 times daily for 14 days.      QUEtiapine 50 MG tablet  Commonly known as:  SEROQUEL  Take 1 tablet by mouth nightly        CHANGE how you take these medications    FLUoxetine 10 MG capsule  Commonly known as:  PROZAC  Take 1 tab of 10 mg along with her home supply of 20 mg I tab daily  What changed:    · medication strength  · how much to take  · how to take this  · when to take this  · additional instructions        CONTINUE taking these medications    * albuterol (2.5 MG/3ML) 0.083% nebulizer solution  Commonly known as:  PROVENTIL  Take 3 mLs by nebulization every 6 hours as needed for Wheezing     * albuterol sulfate  (90 Base) MCG/ACT inhaler  Commonly known as:  PROAIR

## 2019-05-10 NOTE — PROGRESS NOTES
Pt. refused to attend the 1000 skills group, despite staff encouragement.  Electronically signed by Leelee Morris on 5/10/2019 at 12:20 PM

## 2019-05-10 NOTE — GROUP NOTE
Group Therapy Note    Date: May 10    Group Start Time: 1100  Group End Time: 1200  Group Topic: Group Therapy    [facility]    Grace Bence, APRN - CNS        Group Therapy Note    Attendees: 14         Patient's Goal:  Discuss relationship issues. Notes: Attentive to discussion. Shared her introduction to the group but no specific relationship sharing.     Status After Intervention:  Unchanged    Participation Level: Interactive    Participation Quality: Appropriate, Attentive and Sharing      Speech:  normal      Thought Process/Content: Logical      Affective Functioning: Congruent      Mood: improving      Level of consciousness:  Alert and Attentive      Response to Learning: Able to verbalize current knowledge/experience and Progressing to goal      Endings: None Reported    Modes of Intervention: Support, Socialization and Exploration      Discipline Responsible: Registered Nurse      Signature:  Grace Bence, APRN - CNS

## 2019-05-10 NOTE — PROGRESS NOTES
Affect and mood stable  Denied suicidal/homicidal ideation  Belongings returned to patient  Reviewed and patient verbalized understanding of all instructions  Awaiting transport  For discharge home

## 2019-05-14 ENCOUNTER — HOSPITAL ENCOUNTER (OUTPATIENT)
Dept: PSYCHIATRY | Age: 54
Setting detail: THERAPIES SERIES
Discharge: HOME | End: 2019-05-14
Payer: MEDICARE

## 2019-05-14 VITALS
RESPIRATION RATE: 20 BRPM | HEART RATE: 87 BPM | WEIGHT: 150 LBS | SYSTOLIC BLOOD PRESSURE: 130 MMHG | DIASTOLIC BLOOD PRESSURE: 80 MMHG | OXYGEN SATURATION: 98 % | TEMPERATURE: 98.6 F | HEIGHT: 64 IN | BODY MASS INDEX: 25.61 KG/M2

## 2019-05-14 PROCEDURE — 90853 GROUP PSYCHOTHERAPY: CPT | Performed by: REGISTERED NURSE

## 2019-05-14 PROCEDURE — 99213 OFFICE O/P EST LOW 20 MIN: CPT | Performed by: PSYCHIATRY & NEUROLOGY

## 2019-05-14 PROCEDURE — G0410 GRP PSYCH PARTIAL HOSP 45-50: HCPCS | Performed by: REGISTERED NURSE

## 2019-05-14 PROCEDURE — 90853 GROUP PSYCHOTHERAPY: CPT

## 2019-05-14 PROCEDURE — G0410 GRP PSYCH PARTIAL HOSP 45-50: HCPCS

## 2019-05-14 ASSESSMENT — SLEEP AND FATIGUE QUESTIONNAIRES
AVERAGE NUMBER OF SLEEP HOURS: 6
DIFFICULTY STAYING ASLEEP: NO
SLEEP PATTERN: NORMAL
DIFFICULTY ARISING: NO
RESTFUL SLEEP: YES
DIFFICULTY FALLING ASLEEP: NO
DO YOU USE A SLEEP AID: YES
DO YOU HAVE DIFFICULTY SLEEPING: NO

## 2019-05-14 NOTE — BH NOTE
Group Therapy Note     Date: 5/14/2019  Start Time: 1100  End Time:  1200  Number of Participants: 6     Type of Group: Cognitive Skills  Wellness Binder Information  Module Name:  Role Challenges  Session Number:  na     Patient's Goal:  Identify Roles that you have in life and any challenges that may present with those roles.     Notes:  Spoke of her role as mother and grandmother.  Grieving her son moving to [place] with her 3year old granddaughter last month and how much she misses her.     Status After Intervention: Unchanged   Participation Level: Interactive     Participation Quality: Appropriate, Attentive and Sharing        Speech:  normal        Thought Process/Content: Logical        Affective Functioning: Congruent        Mood: anxious and depressed        Level of consciousness:  Alert and Attentive        Response to Learning: Able to verbalize current knowledge/experience and Progressing to goal        Endings: None Reported     Modes of Intervention: Education, Support and Socialization        Discipline Responsible: Registered Nurse        Signature:  MAURICIO Barr

## 2019-05-14 NOTE — BH NOTE
IOP admit note: client arrived promptly for IOP, casually dressed. Reports no current suicidal and/or homicidal ideations, no plans, no intents. Rates current feelings of depression and anxiety both as a \"3\", \"0\" for anger(scale is 0=none, 1=low, 2=moderate, 3=bad). Shared that concentration , sleep, medication efficacy are all fair, motivation and appetite are both poor. Mood is depressed and anxious, good eye contact. Shared that thoughts are clear, affect congruent with subject matter being discussed. Nursing admission assessment completed, oriented to treatment, plan of care developed with client input.

## 2019-05-14 NOTE — PROGRESS NOTES
BEHAVIORAL HEALTH FOLLOW-UP NOTE     6/12/2019     Patient was seen and examined in person, Chart reviewed   Patient's case discussed with staff/team    Chief Complaint: Depression    Interim History:     Pt is doing better, less depressed  Less anxious  No active SI  Denies hopeless and worthless feeling  No AVH or paranoid thoughts  Appetite:   [x] Normal/Unchanged  [] Increased  [] Decreased      Sleep:       [x] Normal/Unchanged  [] Fair       [] Poor              Energy:    [x] Normal/Unchanged  [] Increased  [] Decreased        SI [] Present  [x] Absent    HI  []Present  [x] Absent     Aggression:  [] yes  [x] no    Patient is [x] able  [] unable to CONTRACT FOR SAFETY     PAST MEDICAL/PSYCHIATRIC HISTORY:   Past Medical History:   Diagnosis Date    Asthma     Chronic back pain greater than 3 months duration 2012    Dr Saida Ortega COPD (chronic obstructive pulmonary disease) ([facility] 2017    Dr Bereket Collins    [facility]    Family history of malignant neoplasm of breast     H/O vitamin D deficiency     Invasive ductal carcinoma of left breast ([facility] 05/2018    [id number] ERPR+ her2-, radiation, no chemo, Dr Luu Officer     Dr Maynor Zavaleta       FAMILY/SOCIAL HISTORY:  Family History   Problem Relation Age of Onset    Heart Failure Mother         dec 80    Osteoarthritis Mother     Cancer Mother 61        breast     Breast Cancer Mother     Heart Attack Father         dec 58    Hypertension Father     High Cholesterol Father     Stroke Sister     No Known Problems Son      Social History     Socioeconomic History    Marital status: Single     Spouse name: Not on file    Number of children: 2    Years of education: 6    Highest education level: Not on file   Occupational History    Occupation: , now on [address] resource strain: Not on file    Food insecurity:     Worry: Not on file Inability: Not on file    Transportation needs:     Medical: Not on file     Non-medical: Not on file   Tobacco Use    Smoking status: Former Smoker     Packs/day: 1.00     Years: 20.00     Pack years: 20.00     Types: Cigarettes     Last attempt to quit: 2017     Years since quittin.5    Smokeless tobacco: Never Used   Substance and Sexual Activity    Alcohol use: Yes     Alcohol/week: 0.0 oz     Comment: Hasn't had any alcohol since 2018    Drug use: No    Sexual activity: Not Currently   Lifestyle    Physical activity:     Days per week: Not on file     Minutes per session: Not on file    Stress: Not on file   Relationships    Social connections:     Talks on phone: Not on file     Gets together: Not on file     Attends Druze service: Not on file     Active member of club or organization: Not on file     Attends meetings of clubs or organizations: Not on file     Relationship status: Not on file    Intimate partner violence:     Fear of current or ex partner: Not on file     Emotionally abused: Not on file     Physically abused: Not on file     Forced sexual activity: Not on file   Other Topics Concern    Not on file   Social History Narrative    Born in [place], one of 3 siblings, 2 half sisters (does not talk to them since )    Single, , 2 sons, in [place] and [address] [place] [address] 5, does some babysitting    Lives in a mobil home in [place] alone    Worked as a  x 15 years    [place] 5, attends [employer]     On Medicare for depression x            ROS:  [x] All negative/unchanged except if checked.  Explain positive(checked items) below:  [] Constitutional  [] Eyes  [] Ear/Nose/Mouth/Throat  [] Respiratory  [] CV  [] GI  []   [] Musculoskeletal  [] Skin/Breast  [] Neurological  [] Endocrine  [] Heme/Lymph  [] Allergic/Immunologic    Explanation:     MEDICATIONS:    Current Outpatient Medications:     FLUoxetine (PROZAC) 10 MG capsule, Take 1 tab of 10 mg along with her home supply of 20 mg I tab daily, Disp: 30 capsule, Rfl: 1    clonazePAM (KLONOPIN) 0.5 MG tablet, Take 0.5 tablets by mouth 2 times daily for 14 days. , Disp: 14 tablet, Rfl: 0    QUEtiapine (SEROQUEL) 50 MG tablet, Take 1 tablet by mouth nightly, Disp: 30 tablet, Rfl: 1    ondansetron (ZOFRAN ODT) 4 MG disintegrating tablet, Take 1-2 tablets by mouth every 12 hours as needed for Nausea May Sub regular tablet (non-ODT) if insurance does not cover ODT., Disp: 12 tablet, Rfl: 0    lidocaine (LIDODERM) 5 %, , Disp: , Rfl:     azelastine (ASTELIN) 0.1 % nasal spray, 2 sprays by Nasal route 2 times daily 2 Spray in each nostril, Disp: 1 Bottle, Rfl: 0    SYMBICORT 80-4.5 MCG/ACT AERO, inhale 2 puffs by mouth twice a day, Disp: 10.2 g, Rfl: 5    tiZANidine (ZANAFLEX) 4 MG tablet, take 1 tablet by mouth at bedtime, Disp: , Rfl: 0    Multiple Vitamins-Minerals (MULTIVITAMIN PO), Take by mouth daily Indications: Takes 1 pill daily , Disp: , Rfl:     traMADol (ULTRAM) 50 MG tablet, Take 50 mg by mouth every 6 hours as needed. , Disp: , Rfl: 0    albuterol (PROVENTIL) (2.5 MG/3ML) 0.083% nebulizer solution, Take 3 mLs by nebulization every 6 hours as needed for Wheezing, Disp: 120 each, Rfl: 3    albuterol sulfate HFA (PROAIR HFA) 108 (90 Base) MCG/ACT inhaler, Inhale 2 puffs into the lungs every 6 hours as needed for Wheezing, Disp: 1 Inhaler, Rfl: 3      Examination:  /80   Pulse 87   Temp 98.6 °F (37 °C) (Oral)   Resp 20   Ht 5' 4\" (1.626 m)   Wt 150 lb (68 kg)   LMP 05/08/2014   SpO2 98%   BMI 25.75 kg/m²   Gait - steady  Medication side effects(SE): no    Mental Status Examination:    Level of consciousness:  within normal limits   Appearance:  fair grooming and fair hygiene  Behavior/Motor:  no abnormalities noted  Attitude toward examiner:  cooperative  Speech:  normal rate   Mood: anxious  Affect:  mood congruent  Thought processes:  slow   Thought content:  Suicidal Ideation:  denies suicidal ideation  Cognition:  oriented to person, place, and time   Concentration intact  Insight fair   Judgement fair     ASSESSMENT:   Patient symptoms are:  [] Well controlled  [x] Improving  [] Worsening  [] No change      Diagnosis:   Active Problems:    Major depressive disorder, recurrent episode, in partial remission ([facility]  Resolved Problems:    * No resolved hospital problems. *      LABS:    No results for input(s): WBC, HGB, PLT in the last 72 hours. No results for input(s): NA, K, CL, CO2, BUN, CREATININE, GLUCOSE in the last 72 hours. No results for input(s): BILITOT, ALKPHOS, AST, ALT in the last 72 hours. Lab Results   Component Value Date    LABAMPH Neg 05/03/2019    BARBSCNU Neg 05/03/2019    LABBENZ Neg 05/03/2019    OPIATESCREENURINE Neg 05/03/2019    PHENCYCLIDINESCREENURINE Neg 05/03/2019    ETOH <10 05/03/2019     Lab Results   Component Value Date    TSH 1.590 05/03/2019     No results found for: LITHIUM  No results found for: VALPROATE, CBMZ      Treatment Plan:  Reviewed current Medications with the patient. Resistant to any change in medication  Will continue prozac, klonopin,   Risks, benefits, side effects, drug-to-drug interactions and alternatives to treatment were discussed.   Continue IOP    Electronically signed by Halina Allen MD

## 2019-05-14 NOTE — BH NOTE
Group Therapy Note     Date: 5/14/2019  Start Time: 1000  End Time:  1100  Number of Participants: 6     Type of Group: Psychotherapy     Wellness Binder Information  Module Name:  na  Session Number:  na     Patient's Goal:  Discuss relationship issues.     Notes:  Shares about important relationships in her life.     Status After Intervention:  Improved     Participation Level: Interactive     Participation Quality: Appropriate, Attentive and Sharing        Speech:  normal        Thought Process/Content: Logical        Affective Functioning: Congruent        Mood: anxious and depressed        Level of consciousness:  Alert and Attentive        Response to Learning: Able to verbalize current knowledge/experience and Progressing to goal        Endings: None Reported     Modes of Intervention: Support, Socialization and Exploration        Discipline Responsible: Registered Nurse        Signature:  MAURICIO Brasher

## 2019-05-14 NOTE — BH NOTE
Group Therapy Note     Date: 5/14/2019  Start Time: 0830  End Time:  0945  Number of Participants: 6     Type of Group: Recovery     Wellness Binder Information  Module Name:  Improving your self esteem  Session Number:  N/A     Patient's Goal:  What is self esteem     Notes:  Identified what self esteem means to her. Shared that she plays different roles in her life depending on the situations. Cited examples how she can feel confident in one setting and not in another setting. Identified that she tends to focus so much on what she doesn't like about herself that she forgets she has many positive qualities as well and able to cite some of her positive qualities.     Status After Intervention:  Improved     Participation Level:  Active Listener and Interactive     Participation Quality: Appropriate, Attentive and Sharing        Speech:  normal        Thought Process/Content: Logical        Affective Functioning: Congruent        Mood: anxious and depressed        Level of consciousness:  Alert, Oriented x4 and Attentive        Response to Learning: Able to verbalize current knowledge/experience, Able to verbalize/acknowledge new learning, Able to retain information, Capable of insight and Progressing to goal        Endings: None Reported     Modes of Intervention: Education, Support, Exploration, Clarifying and Problem-solving        Discipline Responsible: Registered Nurse        Signature:  Radha Macdonald RN

## 2019-05-15 ENCOUNTER — HOSPITAL ENCOUNTER (OUTPATIENT)
Dept: PSYCHIATRY | Age: 54
Setting detail: THERAPIES SERIES
Discharge: HOME | End: 2019-05-15
Payer: MEDICARE

## 2019-05-15 PROCEDURE — 90853 GROUP PSYCHOTHERAPY: CPT | Performed by: REGISTERED NURSE

## 2019-05-15 PROCEDURE — G0410 GRP PSYCH PARTIAL HOSP 45-50: HCPCS

## 2019-05-15 PROCEDURE — G0410 GRP PSYCH PARTIAL HOSP 45-50: HCPCS | Performed by: REGISTERED NURSE

## 2019-05-15 PROCEDURE — 90853 GROUP PSYCHOTHERAPY: CPT

## 2019-05-15 NOTE — BH NOTE
Group Therapy Note     Date: 5/15/2019  Start Time: 1015  End Time:  1115  Number of Participants: 10     Type of Group: Psychotherapy     Wellness Binder Information  Module Name:  na  Session Number:  na     Patient's Goal:  Discuss relationship issues.     Notes:  Shares appropriately regarding relationship issues.     Status After Intervention:  Improved     Participation Level: Interactive     Participation Quality: Appropriate, Attentive and Sharing        Speech:  normal        Thought Process/Content: Logical        Affective Functioning: Congruent        Mood: anxious and depressed        Level of consciousness:  Alert        Response to Learning: Able to verbalize current knowledge/experience and Progressing to goal        Endings: None Reported     Modes of Intervention: Support, Socialization and Exploration        Discipline Responsible: Registered Nurse        Signature:  MAURICIO Velazquez

## 2019-05-15 NOTE — BH NOTE
Group Therapy Note     Date: 5/15/2019  Start Time: 1115  End Time:  1200  Number of Participants: 10     Type of Group: Cognitive Skills     Wellness Binder Information  Module Name:  Feeling alone  Session Number:  na     Patient's Goal:  Identify feeling alone and dealing with change and loss. Share relationship that picks you up.     Notes:  Feels alone because of the distance her son moved away.  Feels most filed up by her grand children.     Status After Intervention:  Improved   Participation Level: Interactive     Participation Quality: Appropriate, Attentive and Sharing        Speech:  normal        Thought Process/Content: Logical        Affective Functioning: Congruent        Mood: depressed        Level of consciousness:  Alert and Attentive        Response to Learning: Able to verbalize current knowledge/experience and Progressing to goal        Endings: None Reported     Modes of Intervention: Education, Support and Socialization        Discipline Responsible: Registered Nurse        Signature:  MAURICIO Hill

## 2019-05-15 NOTE — BH NOTE
Group Therapy Note    Date: 5/15/2019  Start Time: 0830  End Time:  10:00 AM  Number of Participants: 11    Type of Group: Recovery    Wellness Binder Information  Module Name:  Improving your self esteem  Session Number:  N/A    Patient's Goal:  How does high self esteem helps you get the most out of life    Notes:  Identified that it can help meet challenges by taking risks and developing her abilities, value herself, have better relationships, be flexible with change even if scary, to be honest with herself of her strengths and weaknesses, changes she can and wants to make and what things she cannot change. Status After Intervention:  Improved    Participation Level:  Active Listener and Interactive    Participation Quality: Appropriate, Attentive and Sharing      Speech:  normal      Thought Process/Content: Logical      Affective Functioning: Congruent      Mood: anxious and depressed      Level of consciousness:  Alert, Oriented x4 and Attentive      Response to Learning: Able to verbalize current knowledge/experience, Able to verbalize/acknowledge new learning, Able to retain information, Capable of insight and Progressing to goal      Endings: None Reported    Modes of Intervention: Education, Support, Exploration, Clarifying and Problem-solving      Discipline Responsible: Registered Nurse      Signature:  Ruddy Callaway RN

## 2019-05-28 ENCOUNTER — HOSPITAL ENCOUNTER (OUTPATIENT)
Dept: PSYCHIATRY | Age: 54
Setting detail: THERAPIES SERIES
Discharge: HOME | End: 2019-05-28
Payer: MEDICARE

## 2019-05-28 NOTE — BH NOTE
Client was a no show and a no call off from IOP. This clinician called clients phone and left voice mail message inquiring on what was going on that she did not come in for IOP today and for her to call this clinician with name and phone number being left.

## 2019-05-28 NOTE — BH NOTE
Case discussed with Dr. Severa Fly with order obtained for discharge. Client has been noncompliant with treatment.

## 2019-05-28 NOTE — BH NOTE
Called clients phone and left message on voice mail informing client of discharge today secondary to noncompliance with treatment. Reviewed discharge instructions via phone and that copy of discharge instructions will be sent via mail to her home mailing address and if she has any questions to call this clinician with name and phone number being provided. Plan of care unresolved secondary to noncompliance with treatment. Discharged from [facility]. Copy of discharge instructions sent to clients home mailing address.

## 2019-05-29 ENCOUNTER — APPOINTMENT (OUTPATIENT)
Dept: PSYCHIATRY | Age: 54
End: 2019-05-29
Payer: MEDICARE

## 2019-05-30 ENCOUNTER — APPOINTMENT (OUTPATIENT)
Dept: PSYCHIATRY | Age: 54
End: 2019-05-30
Payer: MEDICARE

## 2019-08-06 ENCOUNTER — CARE COORDINATION (OUTPATIENT)
Dept: CARE COORDINATION | Age: 54
End: 2019-08-06

## 2019-08-07 NOTE — CARE COORDINATION
Call to cosme betts to discuss care coordination. Left message to return call.  Will send intro letter

## 2019-08-13 ENCOUNTER — CARE COORDINATION (OUTPATIENT)
Dept: CARE COORDINATION | Age: 54
End: 2019-08-13

## 2019-08-20 ENCOUNTER — CARE COORDINATION (OUTPATIENT)
Dept: CARE COORDINATION | Age: 54
End: 2019-08-20

## 2019-08-27 ENCOUNTER — CARE COORDINATION (OUTPATIENT)
Dept: CARE COORDINATION | Age: 54
End: 2019-08-27

## 2020-01-17 ENCOUNTER — OFFICE VISIT (OUTPATIENT)
Dept: INTERNAL MEDICINE CLINIC | Age: 55
End: 2020-01-17
Payer: MEDICARE

## 2020-01-17 VITALS
OXYGEN SATURATION: 98 % | SYSTOLIC BLOOD PRESSURE: 138 MMHG | HEIGHT: 63 IN | BODY MASS INDEX: 26.54 KG/M2 | WEIGHT: 149.8 LBS | DIASTOLIC BLOOD PRESSURE: 84 MMHG | RESPIRATION RATE: 16 BRPM | HEART RATE: 90 BPM | TEMPERATURE: 96.9 F

## 2020-01-17 PROCEDURE — 99213 OFFICE O/P EST LOW 20 MIN: CPT | Performed by: INTERNAL MEDICINE

## 2020-01-17 RX ORDER — FLUOXETINE HYDROCHLORIDE 20 MG/1
20 CAPSULE ORAL DAILY
Qty: 90 CAPSULE | Refills: 1 | Status: SHIPPED | OUTPATIENT
Start: 2020-01-17 | End: 2020-10-06 | Stop reason: SDUPTHER

## 2020-01-17 RX ORDER — PREGABALIN 50 MG/1
CAPSULE ORAL
COMMUNITY
Start: 2020-01-16

## 2020-01-17 ASSESSMENT — ENCOUNTER SYMPTOMS
COUGH: 0
CHOKING: 0
TROUBLE SWALLOWING: 0
ABDOMINAL DISTENTION: 0
ABDOMINAL PAIN: 0
EYE PAIN: 0
SHORTNESS OF BREATH: 0

## 2020-01-17 NOTE — PROGRESS NOTES
Total Bilirubin 04/02/2019 0.3  0.2 - 0.7 mg/dL Final    Comment: Effective:  2/7/2019  New reference range for this analyte has been established.  Alkaline Phosphatase 04/02/2019 65  40 - 130 U/L Final    ALT 04/02/2019 13  0 - 33 U/L Final    AST 04/02/2019 17  0 - 35 U/L Final    Globulin 04/02/2019 2.5  2.3 - 3.5 g/dL Final    Color, UA 04/02/2019 Yellow  Straw/Yellow Final    Clarity, UA 04/02/2019 Clear  Clear Final    Glucose, Ur 04/02/2019 Negative  Negative mg/dL Final    Bilirubin Urine 04/02/2019 Negative  Negative Final    Ketones, Urine 04/02/2019 Negative  Negative mg/dL Final    Specific Gravity, UA 04/02/2019 1.009  1.005 - 1.030 Final    Blood, Urine 04/02/2019 Negative  Negative Final    pH, UA 04/02/2019 6.5  5.0 - 9.0 Final    Protein, UA 04/02/2019 Negative  Negative mg/dL Final    Urobilinogen, Urine 04/02/2019 0.2  <2.0 E.U./dL Final    Nitrite, Urine 04/02/2019 Negative  Negative Final    Leukocyte Esterase, Urine 04/02/2019 TRACE* Negative Final    Urine Reflex to Culture 04/02/2019 YES   Final    Lipase 04/02/2019 37  12 - 95 U/L Final    Comment: Effective:  2/7/2019  New reference range for this analyte has been established.  Urine Culture, Routine 04/02/2019 No growth 24 hours   Final    Bacteria, UA 04/02/2019 FEW* /HPF Final    Hyaline Casts, UA 04/02/2019 0-1  0 - 5 /HPF Final    Comment: Effective 11/26/2018    Urinalysis microscopic performed using the  automated methodology (AUWI analyzer).  WBC, UA 04/02/2019 6-10* 0 - 5 /HPF Final    Comment: Effective 11/26/2018    Urinalysis microscopic performed using the  automated methodology (AUWI analyzer).  RBC, UA 04/02/2019 0-2  0 - 5 /HPF Final    Comment: Effective 11/26/2018    Urinalysis microscopic performed using the  automated methodology (AUWI analyzer).       Epi Cells 04/02/2019 6-10  0 - 5 /HPF Final    Comment: Effective 11/26/2018    Urinalysis microscopic performed using the  automated methodology (AUWI analyzer). HPI:    HPI     Depression  Onset was approximately 10 years ago. Symptoms have been fluctuating since that time. Current symptoms include: anhedonia. Patient denies difficulty concentrating, fatigue, feelings of worthlessness/guilt, hopelessness, impaired memory and recurrent thoughts of death. Family history significant for no psychiatric illness. Possible organic causes contributing are: none. Risk factors: negative life event cancer diagnosis and treatment and previous episode of depression. Previous treatment includes medication. She complains of the following side effects from the treatment: Seroquel caused oversedation. More detail above in the chiefcomplaint(s), interim history and below in the review of systems.      Past Medical History:   Diagnosis Date    Asthma     Chronic back pain greater than 3 months duration 2012    Dr Amador Velasco COPD (chronic obstructive pulmonary disease) [facility]) 2017    Dr Rita Vale 2010     [facility] hospital admissions, counseling [place] [facility]    Family history of malignant neoplasm of breast     H/O vitamin D deficiency     Invasive ductal carcinoma of left breast ([facility] [address] 05/2018    [id number] ERPR+ her2-, radiation, no chemo, Dr Robbin Sinclair       Past Surgical History:   Procedure Laterality Date    BREAST CYST EXCISION Right 2000    benign    BREAST LUMPECTOMY      and [facility] evaluation    DILATION AND CURETTAGE OF UTERUS  2012    due to [address]      right index finger / exc tumor mass / at age 9   Tiffani Muff [place] BX BREAST W DEVICE 1ST LESION ULTRASOUND GUIDE Left 5/11/2018    US guided left breast biopsy, excisional    [place] REMOVAL OF BREAST LESION Left 5/29/2018    RE EXCISION BREAST CANCER SITE, SENTINEL NODE BIOPSY    TONSILLECTOMY      at age 12       Social History     Socioeconomic History    Marital status: Single     Spouse name: Not on file    Number of children: 2  Years of education: 6    Highest education level: Not on file   Occupational History    Occupation: , now on [address] resource strain: Not on file    Food insecurity:     Worry: Not on file     Inability: Not on file   [employer] needs:     Medical: Not on file     Non-medical: Not on file   Tobacco Use    Smoking status: Former Smoker     Packs/day: 1.00     Years: 20.00     Pack years: 20.00     Types: Cigarettes     Last attempt to quit: 2017     Years since quittin.1    Smokeless tobacco: Never Used   Substance and Sexual Activity    Alcohol use:  Yes     Alcohol/week: 0.0 standard drinks     Comment: Hasn't had any alcohol since 2018    Drug use: No    Sexual activity: Not Currently   Lifestyle    Physical activity:     Days per week: Not on file     Minutes per session: Not on file    Stress: Not on file   Relationships    Social connections:     Talks on phone: Not on file     Gets together: Not on file     Attends [employer] service: Not on file     Active member of club or organization: Not on file     Attends meetings of clubs or organizations: Not on file     Relationship status: Not on file    Intimate partner violence:     Fear of current or ex partner: Not on file     Emotionally abused: Not on file     Physically abused: Not on file     Forced sexual activity: Not on file   Other Topics Concern    Not on file   Social History Narrative    Born in [place], one of 3 siblings, 2 half sisters (does not talk to them since )    Single, , 2 sons, in [place] and [address] [place] [address] 5, does some babysitting    Lives in a mobil home in [place] alone    Worked as a  x 15 years    [place] 5, attends [employer]     On Medicare for depression x        Family History   Problem Relation Age of Onset    Heart Failure Mother         dec 80    Osteoarthritis Mother     Cancer Mother 61        breast     Breast Cancer Mother     Heart Attack Father         dec 58    Hypertension Father     High Cholesterol Father     Stroke Sister     No Known Problems Son        Family and socialhistory were reviewed and pertinent changes documented. ALLERGIES    Tamoxifen; Morphine; Abilify [aripiprazole]; Dye [gadolinium derivatives]; Effexor [venlafaxine]; and Seasonal    Current Outpatient Medications on File Prior to Visit   Medication Sig Dispense Refill    pregabalin (LYRICA) 50 MG capsule       ondansetron (ZOFRAN ODT) 4 MG disintegrating tablet Take 1-2 tablets by mouth every 12 hours as needed for Nausea May Sub regular tablet (non-ODT) if insurance does not cover ODT. 12 tablet 0    lidocaine (LIDODERM) 5 %       azelastine (ASTELIN) 0.1 % nasal spray 2 sprays by Nasal route 2 times daily 2 Spray in each nostril 1 Bottle 0    SYMBICORT 80-4.5 MCG/ACT AERO inhale 2 puffs by mouth twice a day 10.2 g 5    tiZANidine (ZANAFLEX) 4 MG tablet take 1 tablet by mouth at bedtime  0    albuterol (PROVENTIL) (2.5 MG/3ML) 0.083% nebulizer solution Take 3 mLs by nebulization every 6 hours as needed for Wheezing 120 each 3    albuterol sulfate HFA (PROAIR HFA) 108 (90 Base) MCG/ACT inhaler Inhale 2 puffs into the lungs every 6 hours as needed for Wheezing 1 Inhaler 3    Multiple Vitamins-Minerals (MULTIVITAMIN PO) Take by mouth daily Indications: Takes 1 pill daily       traMADol (ULTRAM) 50 MG tablet Take 50 mg by mouth every 6 hours as needed. 0     No current facility-administered medications on file prior to visit. Review of Systems   Constitutional: Negative for chills, fatigue, fever and unexpected weight change. HENT: Negative for nosebleeds and trouble swallowing. Eyes: Negative for pain. Respiratory: Negative for cough, choking and shortness of breath. Cardiovascular: Negative for chest pain, palpitations and leg swelling. Gastrointestinal: Negative for abdominal distention and abdominal pain. Endocrine: Negative for cold intolerance and heat intolerance. Genitourinary: Negative for dysuria and hematuria. Musculoskeletal: Negative for myalgias. Skin: Negative for rash. Neurological: Negative for dizziness, tremors, weakness and light-headedness. Psychiatric/Behavioral: Positive for dysphoric mood. Negative for confusion, decreased concentration, self-injury, sleep disturbance and suicidal ideas. The patient is not nervous/anxious. Vitals:    01/17/20 1026   BP: 138/84   Site: Right Upper Arm   Position: Sitting   Cuff Size: Medium Adult   Pulse: 90   Resp: 16   Temp: 96.9 °F (36.1 °C)   TempSrc: Tympanic   SpO2: 98%   Weight: 149 lb 12.8 oz (67.9 kg)   Height: 5' 3\" (1.6 m)       Physical Exam  Constitutional:       General: She is not in acute distress. Appearance: Normal appearance. She is not ill-appearing. Comments: Age-appropriate, well-groomed   HENT:      Head: Atraumatic. Nose: No rhinorrhea. Mouth/Throat:      Mouth: Mucous membranes are moist.   Eyes:      General: No scleral icterus. Extraocular Movements: Extraocular movements intact. Neck:      Musculoskeletal: Neck supple. Cardiovascular:      Rate and Rhythm: Regular rhythm. Pulses: Normal pulses. Heart sounds: Normal heart sounds. No gallop. Pulmonary:      Effort: Pulmonary effort is normal. No respiratory distress. Abdominal:      General: There is no distension. Palpations: Abdomen is soft. Tenderness: There is no tenderness. Musculoskeletal: Normal range of motion. Lymphadenopathy:      Cervical: No cervical adenopathy. Skin:     General: Skin is warm. Coloration: Skin is not pale. Findings: No rash. Neurological:      General: No focal deficit present. Mental Status: She is oriented to person, place, and time. Mental status is at baseline. Motor: No weakness.       Coordination: Coordination normal.      Gait: Gait normal.   Psychiatric: Status:   Future     Standing Expiration Date:   1/16/2021       Close follow up needed to evaluate treatment results and for care coordination. Return in about 4 weeks (around 2/14/2020). I have reviewed the patient's medical and surgical, family and social history, health maintenance schedule, and updated the computerized patient record. Please note this report has been partially produced by using speech recognition hardware. It may contain errors related to the system, including grammar, punctuation and spelling as well as words and phrases that may seem inaccurate. For anyquestions or concerns, please feel free to contact me for clarification.         Electronically signed by Vicente Briseno MD

## 2020-03-12 ENCOUNTER — CARE COORDINATION (OUTPATIENT)
Dept: CARE COORDINATION | Age: 55
End: 2020-03-12

## 2020-03-12 NOTE — LETTER
3/13/2020    Laura Angulo  [address] [address]      Dear Laura Angulo,    My name is Wing Orozco and I am a registered nurse who partners with Lucy Bro MD to improve patients' health. Lucy Bro MD believes you would benefit from working with me. As a member of your health care team, I would work with other providers involved in your care, offer education for your specific health conditions, and connect you with additional resources as needed. I will collaborate with Lucy Bro MD to support you in following your treatment plan. The additional support I provide is no additional cost to you. My primary focus is to help you achieve specific goals and improve your health. We are committed to walk with you on this journey and look forward to working with you. Please call me to further discuss your healthcare needs. I am available by phone or for appointments at the office. You can reach me at [phone].     In good health,     Wing Ernesto RN

## 2020-06-02 ENCOUNTER — TELEPHONE (OUTPATIENT)
Dept: FAMILY MEDICINE CLINIC | Age: 55
End: 2020-06-02

## 2020-08-06 ENCOUNTER — HOSPITAL ENCOUNTER (OUTPATIENT)
Dept: GENERAL RADIOLOGY | Age: 55
Discharge: HOME | End: 2020-08-08
Payer: MEDICARE

## 2020-08-06 PROCEDURE — 73564 X-RAY EXAM KNEE 4 OR MORE: CPT

## 2020-08-06 PROCEDURE — 73030 X-RAY EXAM OF SHOULDER: CPT

## 2020-09-09 ENCOUNTER — CARE COORDINATION (OUTPATIENT)
Dept: CARE COORDINATION | Age: 55
End: 2020-09-09

## 2020-09-23 ENCOUNTER — CARE COORDINATION (OUTPATIENT)
Dept: CARE COORDINATION | Age: 55
End: 2020-09-23

## 2020-09-23 NOTE — CARE COORDINATION
Call to cosme betts to discuss care coordination. Left message to return call.  This was 2nd attempt

## 2020-10-06 ENCOUNTER — CARE COORDINATION (OUTPATIENT)
Dept: CARE COORDINATION | Age: 55
End: 2020-10-06

## 2020-10-06 NOTE — TELEPHONE ENCOUNTER
Rx request   Requested Prescriptions     Pending Prescriptions Disp Refills    FLUoxetine (PROZAC) 20 MG capsule 30 capsule 0     Sig: Take 1 capsule by mouth daily     LOV Visit date not found  Next Visit Date:  Future Appointments   Date Time Provider Stacey Kirkland   10/26/2020  3:00 PM VAIBHAV Steward [facility] EMERGENCY [facility]       Patient is scheduled to establish with you at the end of the month.   She used to see Co[facility] and does not want to run out of medication

## 2020-10-07 RX ORDER — FLUOXETINE HYDROCHLORIDE 20 MG/1
20 CAPSULE ORAL DAILY
Qty: 30 CAPSULE | Refills: 2 | Status: SHIPPED | OUTPATIENT
Start: 2020-10-07

## 2020-10-08 ENCOUNTER — TELEPHONE (OUTPATIENT)
Dept: FAMILY MEDICINE CLINIC | Age: 55
End: 2020-10-08

## 2020-10-08 NOTE — TELEPHONE ENCOUNTER
----- Message from King Cueva PA-C sent at 10/7/2020  8:56 AM EDT -----  Please pend prozac refill for patient in refill encounters. Thank you!  ----- Message -----  From: Aubree Martins RN  Sent: 10/7/2020   8:17 AM EDT  To: VAIBHAV Newberry, this patient called me yesterday- former patient of Dr Donny Hardy. . She did decline care coordination. She is scheduled to see you 10/26 to ext care. She requested a refill of her prozac.   Thanks,  [employer]

## 2024-02-13 ENCOUNTER — APPOINTMENT (OUTPATIENT)
Dept: GENERAL RADIOLOGY | Age: 59
DRG: 751 | End: 2024-02-13
Payer: MEDICAID

## 2024-02-13 ENCOUNTER — HOSPITAL ENCOUNTER (INPATIENT)
Age: 59
LOS: 7 days | Discharge: HOME | DRG: 751 | End: 2024-02-21
Attending: PSYCHIATRY & NEUROLOGY | Admitting: PSYCHIATRY & NEUROLOGY
Payer: MEDICAID

## 2024-02-13 DIAGNOSIS — F32.9 MAJOR DEPRESSIVE DISORDER WITH CURRENT ACTIVE EPISODE, UNSPECIFIED DEPRESSION EPISODE SEVERITY, UNSPECIFIED WHETHER RECURRENT: Primary | ICD-10-CM

## 2024-02-13 DIAGNOSIS — F33.41 MAJOR DEPRESSIVE DISORDER, RECURRENT EPISODE, IN PARTIAL REMISSION (HCC): ICD-10-CM

## 2024-02-13 LAB
ALBUMIN SERPL-MCNC: 4 G/DL (ref 3.5–4.6)
ALP SERPL-CCNC: 67 U/L (ref 40–130)
ALT SERPL-CCNC: 15 U/L (ref 0–33)
AMPHET UR QL SCN: ABNORMAL
ANION GAP SERPL CALCULATED.3IONS-SCNC: 13 MEQ/L (ref 9–15)
APAP SERPL-MCNC: <5 UG/ML (ref 10–30)
AST SERPL-CCNC: 19 U/L (ref 0–35)
BACTERIA URNS QL MICRO: ABNORMAL /HPF
BARBITURATES UR QL SCN: ABNORMAL
BASOPHILS # BLD: 0.1 K/UL (ref 0–0.2)
BASOPHILS NFR BLD: 0.6 %
BENZODIAZ UR QL SCN: ABNORMAL
BILIRUB SERPL-MCNC: 0.3 MG/DL (ref 0.2–0.7)
BILIRUB UR QL STRIP: NEGATIVE
BUN SERPL-MCNC: 9 MG/DL (ref 6–20)
CALCIUM SERPL-MCNC: 9 MG/DL (ref 8.5–9.9)
CANNABINOIDS UR QL SCN: POSITIVE
CHLORIDE SERPL-SCNC: 105 MEQ/L (ref 95–107)
CHOLEST SERPL-MCNC: 171 MG/DL (ref 0–199)
CK SERPL-CCNC: 68 U/L (ref 0–170)
CLARITY UR: ABNORMAL
CO2 SERPL-SCNC: 25 MEQ/L (ref 20–31)
COCAINE UR QL SCN: ABNORMAL
COLOR UR: YELLOW
CREAT SERPL-MCNC: 0.49 MG/DL (ref 0.5–0.9)
DRUG SCREEN COMMENT UR-IMP: ABNORMAL
EOSINOPHIL # BLD: 0.1 K/UL (ref 0–0.7)
EOSINOPHIL NFR BLD: 1.1 %
EPI CELLS #/AREA URNS AUTO: ABNORMAL /HPF (ref 0–5)
ERYTHROCYTE [DISTWIDTH] IN BLOOD BY AUTOMATED COUNT: 12.9 % (ref 11.5–14.5)
ETHANOL PERCENT: NORMAL G/DL
ETHANOLAMINE SERPL-MCNC: <10 MG/DL (ref 0–0.08)
FENTANYL SCREEN, URINE: ABNORMAL
GLOBULIN SER CALC-MCNC: 2.3 G/DL (ref 2.3–3.5)
GLUCOSE SERPL-MCNC: 123 MG/DL (ref 70–99)
GLUCOSE UR STRIP-MCNC: NEGATIVE MG/DL
HCT VFR BLD AUTO: 41.8 % (ref 37–47)
HDLC SERPL-MCNC: 47 MG/DL (ref 40–59)
HGB BLD-MCNC: 13.4 G/DL (ref 12–16)
HGB UR QL STRIP: NEGATIVE
HYALINE CASTS #/AREA URNS AUTO: ABNORMAL /HPF (ref 0–5)
KETONES UR STRIP-MCNC: NEGATIVE MG/DL
LDLC SERPL CALC-MCNC: 104 MG/DL (ref 0–129)
LEUKOCYTE ESTERASE UR QL STRIP: ABNORMAL
LYMPHOCYTES # BLD: 4.6 K/UL (ref 1–4.8)
LYMPHOCYTES NFR BLD: 36.9 %
MCH RBC QN AUTO: 30.8 PG (ref 27–31.3)
MCHC RBC AUTO-ENTMCNC: 32.1 % (ref 33–37)
MCV RBC AUTO: 96.1 FL (ref 79.4–94.8)
METHADONE UR QL SCN: ABNORMAL
MONOCYTES # BLD: 0.7 K/UL (ref 0.2–0.8)
MONOCYTES NFR BLD: 5.6 %
NEUTROPHILS # BLD: 6.9 K/UL (ref 1.4–6.5)
NEUTS SEG NFR BLD: 55.5 %
NITRITE UR QL STRIP: NEGATIVE
OPIATES UR QL SCN: ABNORMAL
OXYCODONE UR QL SCN: ABNORMAL
PCP UR QL SCN: ABNORMAL
PH UR STRIP: 5.5 [PH] (ref 5–9)
PLATELET # BLD AUTO: 331 K/UL (ref 130–400)
POTASSIUM SERPL-SCNC: 3.8 MEQ/L (ref 3.4–4.9)
PROPOXYPH UR QL SCN: ABNORMAL
PROT SERPL-MCNC: 6.3 G/DL (ref 6.3–8)
PROT UR STRIP-MCNC: NEGATIVE MG/DL
RBC # BLD AUTO: 4.35 M/UL (ref 4.2–5.4)
RBC #/AREA URNS AUTO: ABNORMAL /HPF (ref 0–5)
SALICYLATES SERPL-MCNC: <0.3 MG/DL (ref 15–30)
SARS-COV-2 RDRP RESP QL NAA+PROBE: NOT DETECTED
SODIUM SERPL-SCNC: 143 MEQ/L (ref 135–144)
SP GR UR STRIP: 1.02 (ref 1–1.03)
TRIGL SERPL-MCNC: 98 MG/DL (ref 0–150)
TSH SERPL-MCNC: 1.83 UIU/ML (ref 0.44–3.86)
URINE REFLEX TO CULTURE: YES
UROBILINOGEN UR STRIP-ACNC: 0.2 E.U./DL
WBC # BLD AUTO: 12.4 K/UL (ref 4.8–10.8)
WBC #/AREA URNS AUTO: ABNORMAL /HPF (ref 0–5)

## 2024-02-13 PROCEDURE — 80061 LIPID PANEL: CPT

## 2024-02-13 PROCEDURE — 80143 DRUG ASSAY ACETAMINOPHEN: CPT

## 2024-02-13 PROCEDURE — 80053 COMPREHEN METABOLIC PANEL: CPT

## 2024-02-13 PROCEDURE — 82550 ASSAY OF CK (CPK): CPT

## 2024-02-13 PROCEDURE — 81001 URINALYSIS AUTO W/SCOPE: CPT

## 2024-02-13 PROCEDURE — 36415 COLL VENOUS BLD VENIPUNCTURE: CPT

## 2024-02-13 PROCEDURE — 6370000000 HC RX 637 (ALT 250 FOR IP): Performed by: PHYSICIAN ASSISTANT

## 2024-02-13 PROCEDURE — 71046 X-RAY EXAM CHEST 2 VIEWS: CPT

## 2024-02-13 PROCEDURE — 87086 URINE CULTURE/COLONY COUNT: CPT

## 2024-02-13 PROCEDURE — 84443 ASSAY THYROID STIM HORMONE: CPT

## 2024-02-13 PROCEDURE — 80179 DRUG ASSAY SALICYLATE: CPT

## 2024-02-13 PROCEDURE — 99285 EMERGENCY DEPT VISIT HI MDM: CPT

## 2024-02-13 PROCEDURE — 85025 COMPLETE CBC W/AUTO DIFF WBC: CPT

## 2024-02-13 PROCEDURE — 82077 ASSAY SPEC XCP UR&BREATH IA: CPT

## 2024-02-13 PROCEDURE — 80307 DRUG TEST PRSMV CHEM ANLYZR: CPT

## 2024-02-13 PROCEDURE — 87635 SARS-COV-2 COVID-19 AMP PRB: CPT

## 2024-02-13 RX ORDER — LORAZEPAM 1 MG/1
1 TABLET ORAL ONCE
Status: COMPLETED | OUTPATIENT
Start: 2024-02-13 | End: 2024-02-13

## 2024-02-13 RX ADMIN — LORAZEPAM 1 MG: 1 TABLET ORAL at 18:50

## 2024-02-13 ASSESSMENT — PAIN SCALES - GENERAL: PAINLEVEL_OUTOF10: 5

## 2024-02-13 ASSESSMENT — PAIN DESCRIPTION - PAIN TYPE: TYPE: CHRONIC PAIN

## 2024-02-13 ASSESSMENT — LIFESTYLE VARIABLES
HOW MANY STANDARD DRINKS CONTAINING ALCOHOL DO YOU HAVE ON A TYPICAL DAY: 1 OR 2
HOW OFTEN DO YOU HAVE A DRINK CONTAINING ALCOHOL: MONTHLY OR LESS

## 2024-02-13 ASSESSMENT — PAIN DESCRIPTION - LOCATION: LOCATION: GENERALIZED

## 2024-02-13 ASSESSMENT — PAIN - FUNCTIONAL ASSESSMENT: PAIN_FUNCTIONAL_ASSESSMENT: 0-10

## 2024-02-13 ASSESSMENT — PAIN DESCRIPTION - DESCRIPTORS: DESCRIPTORS: DISCOMFORT

## 2024-02-13 NOTE — ED PROVIDER NOTES
[facility] ED  EMERGENCY DEPARTMENT ENCOUNTER      Pt Name: Elida Whitehead  MRN: [id number]  Birthdate 1965  Date of evaluation: 2/13/2024  Provider: Vidal Hull PA-C  6:19 PM EST    CHIEF COMPLAINT       Chief Complaint   Patient presents with    Depression     Anxiety and depression.          HISTORY OF PRESENT ILLNESS   (Location/Symptom, Timing/Onset, Context/Setting, Quality, Duration, Modifying Factors, Severity)  Note limiting factors.   Elida Whitehead is a 58 y.o. female who presents to the emergency department patient presents for anxiety depression sleep disturbance she also has complaint of slight cough started yesterday denies fever chills vomiting chest pain abdominal pain pain burning frequent urination.  States she is on Prozac was recently changed.  She sees primary care at [facility].  Symptoms are moderate severity.  She does deny any thoughts of harming yourself or anybody else.    HPI    Nursing Notes were reviewed.    REVIEW OF SYSTEMS    (2-9 systems for level 4, 10 or more for level 5)     Review of Systems   Constitutional:  Negative for activity change, appetite change and unexpected weight change.   HENT:  Negative for ear discharge, nosebleeds and voice change.    Eyes:  Negative for discharge.   Respiratory:  Positive for cough. Negative for apnea.    Cardiovascular:  Negative for chest pain.   Gastrointestinal:  Negative for abdominal distention, abdominal pain, nausea and vomiting.   Genitourinary:  Negative for dysuria and hematuria.   Musculoskeletal:  Negative for joint swelling.   Skin:  Negative for pallor.   Neurological:  Negative for seizures and facial asymmetry.   Hematological:  Does not bruise/bleed easily.   Psychiatric/Behavioral:  Positive for sleep disturbance. Negative for self-injury. The patient is nervous/anxious.    All other systems reviewed and are negative.      Except as noted above the remainder of the review of systems was

## 2024-02-13 NOTE — ED NOTES
Medicated as ordered for C/O anxiety. Second Phlebotomist  unable to obtain labs. Urine specimen obtained & sent to lab.

## 2024-02-13 NOTE — ED NOTES
Changed into  hospital clothing with much encouragement. No skin breakdown noted & no contraband found @ this time. Denies need to void. PO fluids given. Oriented to FRANKIE. Verbalizes understanding.

## 2024-02-13 NOTE — ED NOTES
Per daughter-in-law, Gena Patient has lost 30 pounds since Harish. & told her multiple times, \"I want to be done with all this\". Patient reported to PA she is not sleeping & has increased anxiety.

## 2024-02-14 PROBLEM — F33.9 MAJOR DEPRESSION, RECURRENT (HCC): Status: ACTIVE | Noted: 2024-02-14

## 2024-02-14 PROCEDURE — 6370000000 HC RX 637 (ALT 250 FOR IP): Performed by: REGISTERED NURSE

## 2024-02-14 PROCEDURE — 1240000000 HC EMOTIONAL WELLNESS R&B

## 2024-02-14 PROCEDURE — 6370000000 HC RX 637 (ALT 250 FOR IP): Performed by: PSYCHIATRY & NEUROLOGY

## 2024-02-14 PROCEDURE — 99223 1ST HOSP IP/OBS HIGH 75: CPT | Performed by: PSYCHIATRY & NEUROLOGY

## 2024-02-14 RX ORDER — HALOPERIDOL 5 MG/1
5 TABLET ORAL EVERY 6 HOURS PRN
Status: DISCONTINUED | OUTPATIENT
Start: 2024-02-14 | End: 2024-02-21 | Stop reason: HOSPADM

## 2024-02-14 RX ORDER — TRAMADOL HYDROCHLORIDE 50 MG/1
50 TABLET ORAL DAILY PRN
Status: DISCONTINUED | OUTPATIENT
Start: 2024-02-14 | End: 2024-02-21 | Stop reason: HOSPADM

## 2024-02-14 RX ORDER — HALOPERIDOL 5 MG/ML
5 INJECTION INTRAMUSCULAR EVERY 6 HOURS PRN
Status: DISCONTINUED | OUTPATIENT
Start: 2024-02-14 | End: 2024-02-21 | Stop reason: HOSPADM

## 2024-02-14 RX ORDER — BENZTROPINE MESYLATE 1 MG/ML
2 INJECTION INTRAMUSCULAR; INTRAVENOUS 2 TIMES DAILY PRN
Status: DISCONTINUED | OUTPATIENT
Start: 2024-02-14 | End: 2024-02-21 | Stop reason: HOSPADM

## 2024-02-14 RX ORDER — MAGNESIUM HYDROXIDE/ALUMINUM HYDROXICE/SIMETHICONE 120; 1200; 1200 MG/30ML; MG/30ML; MG/30ML
30 SUSPENSION ORAL PRN
Status: DISCONTINUED | OUTPATIENT
Start: 2024-02-14 | End: 2024-02-21 | Stop reason: HOSPADM

## 2024-02-14 RX ORDER — BUDESONIDE AND FORMOTEROL FUMARATE DIHYDRATE 160; 4.5 UG/1; UG/1
2 AEROSOL RESPIRATORY (INHALATION)
Status: DISCONTINUED | OUTPATIENT
Start: 2024-02-14 | End: 2024-02-21 | Stop reason: HOSPADM

## 2024-02-14 RX ORDER — ACETAMINOPHEN 325 MG/1
650 TABLET ORAL EVERY 4 HOURS PRN
Status: DISCONTINUED | OUTPATIENT
Start: 2024-02-14 | End: 2024-02-21 | Stop reason: HOSPADM

## 2024-02-14 RX ORDER — FLUOXETINE HYDROCHLORIDE 20 MG/1
20 CAPSULE ORAL DAILY
Status: DISCONTINUED | OUTPATIENT
Start: 2024-02-14 | End: 2024-02-19

## 2024-02-14 RX ORDER — HYDROXYZINE HYDROCHLORIDE 50 MG/ML
50 INJECTION, SOLUTION INTRAMUSCULAR EVERY 6 HOURS PRN
Status: DISCONTINUED | OUTPATIENT
Start: 2024-02-14 | End: 2024-02-21 | Stop reason: HOSPADM

## 2024-02-14 RX ORDER — ALBUTEROL SULFATE 90 UG/1
2 AEROSOL, METERED RESPIRATORY (INHALATION) EVERY 6 HOURS PRN
Status: DISCONTINUED | OUTPATIENT
Start: 2024-02-14 | End: 2024-02-21 | Stop reason: HOSPADM

## 2024-02-14 RX ORDER — ACETAMINOPHEN 80 MG
TABLET,CHEWABLE ORAL ONCE
Status: COMPLETED | OUTPATIENT
Start: 2024-02-14 | End: 2024-02-14

## 2024-02-14 RX ORDER — CELECOXIB 100 MG/1
100 CAPSULE ORAL 2 TIMES DAILY
COMMUNITY

## 2024-02-14 RX ORDER — CIPROFLOXACIN 500 MG/1
500 TABLET, FILM COATED ORAL EVERY 12 HOURS SCHEDULED
Status: COMPLETED | OUTPATIENT
Start: 2024-02-14 | End: 2024-02-16

## 2024-02-14 RX ORDER — HYDROXYZINE PAMOATE 50 MG/1
50 CAPSULE ORAL EVERY 6 HOURS PRN
Status: DISCONTINUED | OUTPATIENT
Start: 2024-02-14 | End: 2024-02-21 | Stop reason: HOSPADM

## 2024-02-14 RX ORDER — HYDROXYZINE PAMOATE 50 MG/1
50 CAPSULE ORAL EVERY 6 HOURS PRN
Status: DISCONTINUED | OUTPATIENT
Start: 2024-02-14 | End: 2024-02-14

## 2024-02-14 RX ORDER — CYCLOBENZAPRINE HCL 5 MG
5 TABLET ORAL 3 TIMES DAILY
COMMUNITY

## 2024-02-14 RX ORDER — CELECOXIB 100 MG/1
100 CAPSULE ORAL
Status: DISCONTINUED | OUTPATIENT
Start: 2024-02-14 | End: 2024-02-21 | Stop reason: HOSPADM

## 2024-02-14 RX ORDER — CYCLOBENZAPRINE HCL 10 MG
5 TABLET ORAL 3 TIMES DAILY
Status: DISCONTINUED | OUTPATIENT
Start: 2024-02-14 | End: 2024-02-21 | Stop reason: HOSPADM

## 2024-02-14 RX ORDER — TRAZODONE HYDROCHLORIDE 50 MG/1
50 TABLET ORAL NIGHTLY PRN
Status: DISCONTINUED | OUTPATIENT
Start: 2024-02-14 | End: 2024-02-21 | Stop reason: HOSPADM

## 2024-02-14 RX ADMIN — Medication: at 14:39

## 2024-02-14 RX ADMIN — FLUOXETINE HYDROCHLORIDE 20 MG: 20 CAPSULE ORAL at 10:15

## 2024-02-14 RX ADMIN — CYCLOBENZAPRINE 5 MG: 10 TABLET, FILM COATED ORAL at 20:59

## 2024-02-14 RX ADMIN — CYCLOBENZAPRINE 5 MG: 10 TABLET, FILM COATED ORAL at 14:40

## 2024-02-14 RX ADMIN — HYDROXYZINE PAMOATE 50 MG: 50 CAPSULE ORAL at 12:52

## 2024-02-14 RX ADMIN — TRAMADOL HYDROCHLORIDE 50 MG: 50 TABLET ORAL at 14:39

## 2024-02-14 RX ADMIN — CELECOXIB 100 MG: 100 CAPSULE ORAL at 20:59

## 2024-02-14 RX ADMIN — CIPROFLOXACIN HYDROCHLORIDE 500 MG: 500 TABLET, FILM COATED ORAL at 20:59

## 2024-02-14 RX ADMIN — ACETAMINOPHEN 325MG 650 MG: 325 TABLET ORAL at 10:08

## 2024-02-14 RX ADMIN — TRAZODONE HYDROCHLORIDE 50 MG: 50 TABLET ORAL at 22:34

## 2024-02-14 RX ADMIN — CIPROFLOXACIN HYDROCHLORIDE 500 MG: 500 TABLET, FILM COATED ORAL at 10:08

## 2024-02-14 RX ADMIN — HYDROXYZINE PAMOATE 50 MG: 50 CAPSULE ORAL at 06:26

## 2024-02-14 ASSESSMENT — PATIENT HEALTH QUESTIONNAIRE - PHQ9
2. FEELING DOWN, DEPRESSED OR HOPELESS: 2
SUM OF ALL RESPONSES TO PHQ QUESTIONS 1-9: 17
5. POOR APPETITE OR OVEREATING: 3
3. TROUBLE FALLING OR STAYING ASLEEP: 3
9. THOUGHTS THAT YOU WOULD BE BETTER OFF DEAD, OR OF HURTING YOURSELF: 0
10. IF YOU CHECKED OFF ANY PROBLEMS, HOW DIFFICULT HAVE THESE PROBLEMS MADE IT FOR YOU TO DO YOUR WORK, TAKE CARE OF THINGS AT HOME, OR GET ALONG WITH OTHER PEOPLE: 2
4. FEELING TIRED OR HAVING LITTLE ENERGY: 2
6. FEELING BAD ABOUT YOURSELF - OR THAT YOU ARE A FAILURE OR HAVE LET YOURSELF OR YOUR FAMILY DOWN: 2
SUM OF ALL RESPONSES TO PHQ QUESTIONS 1-9: 17
SUM OF ALL RESPONSES TO PHQ9 QUESTIONS 1 & 2: 4
SUM OF ALL RESPONSES TO PHQ QUESTIONS 1-9: 17
1. LITTLE INTEREST OR PLEASURE IN DOING THINGS: 2
7. TROUBLE CONCENTRATING ON THINGS, SUCH AS READING THE NEWSPAPER OR WATCHING TELEVISION: 2
8. MOVING OR SPEAKING SO SLOWLY THAT OTHER PEOPLE COULD HAVE NOTICED. OR THE OPPOSITE, BEING SO FIGETY OR RESTLESS THAT YOU HAVE BEEN MOVING AROUND A LOT MORE THAN USUAL: 1
SUM OF ALL RESPONSES TO PHQ QUESTIONS 1-9: 17

## 2024-02-14 ASSESSMENT — PAIN SCALES - GENERAL
PAINLEVEL_OUTOF10: 7
PAINLEVEL_OUTOF10: 6
PAINLEVEL_OUTOF10: 7

## 2024-02-14 ASSESSMENT — PAIN SCALES - WONG BAKER
WONGBAKER_NUMERICALRESPONSE: 2
WONGBAKER_NUMERICALRESPONSE: 8

## 2024-02-14 ASSESSMENT — SLEEP AND FATIGUE QUESTIONNAIRES
SLEEP PATTERN: DIFFICULTY FALLING ASLEEP;DISTURBED/INTERRUPTED SLEEP;INSOMNIA;RESTLESSNESS
DO YOU USE A SLEEP AID: NO
DO YOU HAVE DIFFICULTY SLEEPING: YES

## 2024-02-14 ASSESSMENT — PAIN DESCRIPTION - DESCRIPTORS
DESCRIPTORS: ACHING;TIGHTNESS
DESCRIPTORS: ACHING;BURNING;TIGHTNESS;TINGLING

## 2024-02-14 ASSESSMENT — PAIN DESCRIPTION - LOCATION
LOCATION: BACK
LOCATION: GENERALIZED

## 2024-02-14 ASSESSMENT — PAIN DESCRIPTION - ORIENTATION: ORIENTATION: LOWER

## 2024-02-14 NOTE — GROUP NOTE
Group Therapy Note    Date: 2/14/2024    Group Start Time: 1000  Group End Time: 1050  Group Topic: Art Therapy     [facility] 3W Marj Flannery, SUSHILA        Group Therapy Note    Attendees: 9       Patient's Goal:  To speak to the doctor to get a plan of care started.     Notes:  Patient did not attend.     Modes of Intervention: Activity      Discipline Responsible: Psychoeducational Specialist      Signature:  Marj HOLDER

## 2024-02-14 NOTE — H&P
[facility] - Department of Psychiatry    History and Physical - Adult         CHIEF COMPLAINT:  SI depression     History obtained from:  patient, electronic medical record    Patient was seen after discussing with the treatment team and reviewing the chart        CIRCUMSTANCES OF ADMISSION:    Elida Whitehead is a 58 y.o. female who presented to the emergency department for anxiety, depression, and sleep disturbance. Patient states she has had increased anxiety and depression since October. States her mind has been racing and some paranoia that started in January. States she has been paranoid to leave her house or go to the stores. Decreased interest in things she use to like to do, no motivation to get out of bed. States she feels uneasy at her house. Patient states she was started on Prozac 20 mg two weeks ago. Patient Denies SI/HI and auditory hallucinations. Decreased appetite and states she has lost 15+ pounds since Thanksgiving. Trouble falling asleep and staying asleep.      HISTORY OF PRESENT ILLNESS:      The patient is a 58 y.o. female with significant past history of MDD and YO    Tearful, pt reports feeling out of it.  States previously taking medication for depression, however ran out of medication in October.  States previously managed by PCP. States feels so alone.  \"My son can be so mean to mean\" Pt reports feeling like her son just want to put her in the hospital. Feels scared that her son just wants to lock her away. Interested in family counseling with son, Kade Balderrama age 42. Feels pushed over the edge.     Duration: several months  Severity: Rating mood to be around 0/10 (10- good)  Quality:melancholic  Worse all day  Content: Hopeless, worthless and helpless feeling  Suicidal thoughts active  Associated symptoms:  Poor concentration, anhedonia, decrease motivation  Sleep and appetite- poor sleeping appx 3-4 hours    YO:    Excessive anxiety and worry, occurring most days
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
## 2024-02-14 NOTE — GROUP NOTE
Group Therapy Note    Date: 2/14/2024    Group Start Time: 1330  Group End Time: 1400  Group Topic: Medication    [facility] 3[facility] LOANI    Lori Aguilar LPN; Shilpi Contreras LPN        Group Therapy Note    Attendees: 7       Patient's Goal:  Medication Education    Notes:  Patient participated in group. Was friendly and interactive with peers.     Status After Intervention:  Improved    Participation Level: Active Listener and Interactive    Participation Quality: Appropriate      Speech:  normal      Thought Process/Content: Logical      Affective Functioning: Congruent      Mood:  Normal      Level of consciousness:  Alert and Oriented x4      Response to Learning: Able to verbalize current knowledge/experience, Able to verbalize/acknowledge new learning, and Able to retain information      Endings: None Reported    Modes of Intervention: Education, Support, and Socialization      Discipline Responsible: Licensed Practical Nurse      Signature:  Shilpi Contreras LPN

## 2024-02-15 LAB — BACTERIA UR CULT: NORMAL

## 2024-02-15 PROCEDURE — 6370000000 HC RX 637 (ALT 250 FOR IP): Performed by: REGISTERED NURSE

## 2024-02-15 PROCEDURE — 93005 ELECTROCARDIOGRAM TRACING: CPT | Performed by: PSYCHIATRY & NEUROLOGY

## 2024-02-15 PROCEDURE — 1240000000 HC EMOTIONAL WELLNESS R&B

## 2024-02-15 PROCEDURE — 99232 SBSQ HOSP IP/OBS MODERATE 35: CPT | Performed by: PSYCHIATRY & NEUROLOGY

## 2024-02-15 PROCEDURE — 6370000000 HC RX 637 (ALT 250 FOR IP): Performed by: PSYCHIATRY & NEUROLOGY

## 2024-02-15 RX ORDER — QUETIAPINE FUMARATE 50 MG/1
50 TABLET, EXTENDED RELEASE ORAL NIGHTLY
Status: DISCONTINUED | OUTPATIENT
Start: 2024-02-15 | End: 2024-02-19

## 2024-02-15 RX ADMIN — CYCLOBENZAPRINE 5 MG: 10 TABLET, FILM COATED ORAL at 14:33

## 2024-02-15 RX ADMIN — CIPROFLOXACIN HYDROCHLORIDE 500 MG: 500 TABLET, FILM COATED ORAL at 09:17

## 2024-02-15 RX ADMIN — CIPROFLOXACIN HYDROCHLORIDE 500 MG: 500 TABLET, FILM COATED ORAL at 20:59

## 2024-02-15 RX ADMIN — TRAMADOL HYDROCHLORIDE 50 MG: 50 TABLET ORAL at 09:17

## 2024-02-15 RX ADMIN — FLUOXETINE HYDROCHLORIDE 20 MG: 20 CAPSULE ORAL at 09:17

## 2024-02-15 RX ADMIN — ACETAMINOPHEN 325MG 650 MG: 325 TABLET ORAL at 19:21

## 2024-02-15 RX ADMIN — CELECOXIB 100 MG: 100 CAPSULE ORAL at 09:15

## 2024-02-15 RX ADMIN — ACETAMINOPHEN 325MG 650 MG: 325 TABLET ORAL at 14:34

## 2024-02-15 RX ADMIN — HYDROXYZINE PAMOATE 50 MG: 50 CAPSULE ORAL at 17:40

## 2024-02-15 RX ADMIN — CYCLOBENZAPRINE 5 MG: 10 TABLET, FILM COATED ORAL at 09:15

## 2024-02-15 RX ADMIN — CELECOXIB 100 MG: 100 CAPSULE ORAL at 18:38

## 2024-02-15 RX ADMIN — HYDROXYZINE PAMOATE 50 MG: 50 CAPSULE ORAL at 09:24

## 2024-02-15 RX ADMIN — CYCLOBENZAPRINE 5 MG: 10 TABLET, FILM COATED ORAL at 20:59

## 2024-02-15 RX ADMIN — QUETIAPINE FUMARATE 50 MG: 50 TABLET, EXTENDED RELEASE ORAL at 21:00

## 2024-02-15 ASSESSMENT — PAIN DESCRIPTION - LOCATION
LOCATION: BACK;OTHER (COMMENT)
LOCATION: BACK;OTHER (COMMENT)
LOCATION: BACK
LOCATION: ARM

## 2024-02-15 ASSESSMENT — PAIN DESCRIPTION - DESCRIPTORS
DESCRIPTORS: ACHING
DESCRIPTORS: ACHING;TIGHTNESS;TINGLING

## 2024-02-15 ASSESSMENT — PAIN SCALES - GENERAL
PAINLEVEL_OUTOF10: 7
PAINLEVEL_OUTOF10: 7
PAINLEVEL_OUTOF10: 9
PAINLEVEL_OUTOF10: 7

## 2024-02-15 ASSESSMENT — PAIN DESCRIPTION - ORIENTATION: ORIENTATION: LEFT

## 2024-02-15 ASSESSMENT — PAIN SCALES - WONG BAKER
WONGBAKER_NUMERICALRESPONSE: 2
WONGBAKER_NUMERICALRESPONSE: 2

## 2024-02-15 NOTE — GROUP NOTE
Group Therapy Note    Date: 2/15/2024    Group Start Time: 1415  Group End Time: 1445  Group Topic: Guest Group    [facility] 3W Freya Muñoz LPN        Group Therapy Note    Attendees: 13/20       Patient's Goal:  [facility] intro    Notes:  [facility] came to explain the services they provide.    Status After Intervention:  Improved    Participation Level: Active Listener    Participation Quality: Appropriate      Speech:  normal      Thought Process/Content: Logical      Affective Functioning: Congruent      Mood: Normal      Level of consciousness:  Alert and Oriented x4      Response to Learning: Able to verbalize current knowledge/experience      Endings: None Reported    Modes of Intervention: Education      Discipline Responsible: [facility]  volunteer      Signature:  Freya Wright LPN

## 2024-02-15 NOTE — GROUP NOTE
Group Therapy Note    Date: 2/15/2024    Group Start Time: 1640  Group End Time: 1720  Group Topic: Healthy Living/Wellness    [facility] 3W Nenita Jerome RN        Group Therapy Note    Attendees: 10/19       Patient's Goal:      Notes:      Status After Intervention:  Improved    Participation Level: Active Listener    Participation Quality: Appropriate      Speech:  hesitant      Thought Process/Content: Logical      Affective Functioning: Flat      Mood: depressed      Level of consciousness:  Alert      Response to Learning: Capable of insight      Endings: None Reported    Modes of Intervention: Education      Discipline Responsible: Registered Nurse      Signature:  Nenita De Los Santos RN

## 2024-02-16 LAB
EKG ATRIAL RATE: 70 BPM
EKG P AXIS: 58 DEGREES
EKG P-R INTERVAL: 146 MS
EKG Q-T INTERVAL: 428 MS
EKG QRS DURATION: 84 MS
EKG QTC CALCULATION (BAZETT): 462 MS
EKG R AXIS: 57 DEGREES
EKG T AXIS: 52 DEGREES
EKG VENTRICULAR RATE: 70 BPM

## 2024-02-16 PROCEDURE — 99232 SBSQ HOSP IP/OBS MODERATE 35: CPT | Performed by: PSYCHIATRY & NEUROLOGY

## 2024-02-16 PROCEDURE — 6370000000 HC RX 637 (ALT 250 FOR IP): Performed by: REGISTERED NURSE

## 2024-02-16 PROCEDURE — 6370000000 HC RX 637 (ALT 250 FOR IP): Performed by: PSYCHIATRY & NEUROLOGY

## 2024-02-16 PROCEDURE — 1240000000 HC EMOTIONAL WELLNESS R&B

## 2024-02-16 PROCEDURE — 93010 ELECTROCARDIOGRAM REPORT: CPT | Performed by: INTERNAL MEDICINE

## 2024-02-16 RX ADMIN — CYCLOBENZAPRINE 5 MG: 10 TABLET, FILM COATED ORAL at 20:51

## 2024-02-16 RX ADMIN — ACETAMINOPHEN 325MG 650 MG: 325 TABLET ORAL at 10:09

## 2024-02-16 RX ADMIN — CELECOXIB 100 MG: 100 CAPSULE ORAL at 09:17

## 2024-02-16 RX ADMIN — CIPROFLOXACIN HYDROCHLORIDE 500 MG: 500 TABLET, FILM COATED ORAL at 20:51

## 2024-02-16 RX ADMIN — QUETIAPINE FUMARATE 50 MG: 50 TABLET, EXTENDED RELEASE ORAL at 20:51

## 2024-02-16 RX ADMIN — HYDROXYZINE PAMOATE 50 MG: 50 CAPSULE ORAL at 10:10

## 2024-02-16 RX ADMIN — CYCLOBENZAPRINE 5 MG: 10 TABLET, FILM COATED ORAL at 09:17

## 2024-02-16 RX ADMIN — CYCLOBENZAPRINE 5 MG: 10 TABLET, FILM COATED ORAL at 14:37

## 2024-02-16 RX ADMIN — CELECOXIB 100 MG: 100 CAPSULE ORAL at 18:20

## 2024-02-16 RX ADMIN — CIPROFLOXACIN HYDROCHLORIDE 500 MG: 500 TABLET, FILM COATED ORAL at 09:17

## 2024-02-16 RX ADMIN — TRAMADOL HYDROCHLORIDE 50 MG: 50 TABLET ORAL at 10:51

## 2024-02-16 RX ADMIN — FLUOXETINE HYDROCHLORIDE 20 MG: 20 CAPSULE ORAL at 09:18

## 2024-02-16 ASSESSMENT — PAIN SCALES - GENERAL
PAINLEVEL_OUTOF10: 0
PAINLEVEL_OUTOF10: 7
PAINLEVEL_OUTOF10: 6
PAINLEVEL_OUTOF10: 7
PAINLEVEL_OUTOF10: 7

## 2024-02-16 ASSESSMENT — PAIN DESCRIPTION - LOCATION
LOCATION: BACK
LOCATION: NECK
LOCATION: BACK;OTHER (COMMENT)

## 2024-02-16 ASSESSMENT — PAIN - FUNCTIONAL ASSESSMENT: PAIN_FUNCTIONAL_ASSESSMENT: ACTIVITIES ARE NOT PREVENTED

## 2024-02-16 ASSESSMENT — PAIN DESCRIPTION - DESCRIPTORS
DESCRIPTORS: ACHING
DESCRIPTORS: TIGHTNESS
DESCRIPTORS: ACHING;BURNING

## 2024-02-16 ASSESSMENT — PAIN DESCRIPTION - ORIENTATION: ORIENTATION: LEFT

## 2024-02-16 NOTE — GROUP NOTE
Group Therapy Note    Date: 2/16/2024    Group Start Time: 1400  Group End Time: 1445  Group Topic: Healthy Living/Wellness    [facility] 3W Janette Díaz        Group Therapy Note    Attendees: 8       Patient's Goal:  To attend group    Notes:  Pt was attentive     Status After Intervention:  Unchanged    Participation Level: Interactive    Participation Quality: Appropriate      Speech:  normal      Thought Process/Content: Logical      Affective Functioning: Congruent      Mood: euthymic      Level of consciousness:  Alert      Response to Learning: Able to verbalize current knowledge/experience      Endings: None Reported    Modes of Intervention: Socialization      Discipline Responsible: Behavorial Health Tech      Signature:  Janette Monte

## 2024-02-16 NOTE — GROUP NOTE
Group Therapy Note    Date: 2/16/2024    Group Start Time: 1000  Group End Time: 1100  Group Topic: Activity    [facility] 3W Janette Díaz        Group Therapy Note    Attendees: 8       Patient's Goal:  To attend group    Notes:  Pt was attentive     Status After Intervention:  Unchanged    Participation Level: Interactive    Participation Quality: Appropriate      Speech:  normal      Thought Process/Content: Logical      Affective Functioning: Congruent      Mood: euthymic      Level of consciousness:  Alert      Response to Learning: Able to verbalize current knowledge/experience      Endings: None Reported    Modes of Intervention: Activity      Discipline Responsible: Behavorial Health Tech      Signature:  Janette Monte

## 2024-02-17 PROCEDURE — 1240000000 HC EMOTIONAL WELLNESS R&B

## 2024-02-17 PROCEDURE — 6370000000 HC RX 637 (ALT 250 FOR IP): Performed by: REGISTERED NURSE

## 2024-02-17 PROCEDURE — 6370000000 HC RX 637 (ALT 250 FOR IP): Performed by: PSYCHIATRY & NEUROLOGY

## 2024-02-17 RX ADMIN — CYCLOBENZAPRINE 5 MG: 10 TABLET, FILM COATED ORAL at 15:11

## 2024-02-17 RX ADMIN — HYDROXYZINE PAMOATE 50 MG: 50 CAPSULE ORAL at 17:05

## 2024-02-17 RX ADMIN — FLUOXETINE HYDROCHLORIDE 20 MG: 20 CAPSULE ORAL at 09:12

## 2024-02-17 RX ADMIN — CYCLOBENZAPRINE 5 MG: 10 TABLET, FILM COATED ORAL at 09:12

## 2024-02-17 RX ADMIN — CYCLOBENZAPRINE 5 MG: 10 TABLET, FILM COATED ORAL at 21:33

## 2024-02-17 RX ADMIN — TRAZODONE HYDROCHLORIDE 50 MG: 50 TABLET ORAL at 21:34

## 2024-02-17 RX ADMIN — QUETIAPINE FUMARATE 50 MG: 50 TABLET, EXTENDED RELEASE ORAL at 21:33

## 2024-02-17 RX ADMIN — CELECOXIB 100 MG: 100 CAPSULE ORAL at 09:13

## 2024-02-17 RX ADMIN — TRAMADOL HYDROCHLORIDE 50 MG: 50 TABLET ORAL at 12:55

## 2024-02-17 RX ADMIN — CELECOXIB 100 MG: 100 CAPSULE ORAL at 18:21

## 2024-02-17 ASSESSMENT — PAIN DESCRIPTION - LOCATION
LOCATION: ARM
LOCATION: NECK;BACK

## 2024-02-17 ASSESSMENT — PAIN DESCRIPTION - DESCRIPTORS
DESCRIPTORS: ACHING

## 2024-02-17 ASSESSMENT — PAIN SCALES - GENERAL
PAINLEVEL_OUTOF10: 0
PAINLEVEL_OUTOF10: 7
PAINLEVEL_OUTOF10: 6

## 2024-02-17 ASSESSMENT — PAIN DESCRIPTION - ORIENTATION
ORIENTATION: LEFT
ORIENTATION: RIGHT;LEFT;ANTERIOR;DISTAL

## 2024-02-18 PROCEDURE — 1240000000 HC EMOTIONAL WELLNESS R&B

## 2024-02-18 PROCEDURE — 99221 1ST HOSP IP/OBS SF/LOW 40: CPT | Performed by: PAIN MEDICINE

## 2024-02-18 PROCEDURE — 6370000000 HC RX 637 (ALT 250 FOR IP): Performed by: PSYCHIATRY & NEUROLOGY

## 2024-02-18 PROCEDURE — 6370000000 HC RX 637 (ALT 250 FOR IP): Performed by: REGISTERED NURSE

## 2024-02-18 PROCEDURE — 6370000000 HC RX 637 (ALT 250 FOR IP): Performed by: PAIN MEDICINE

## 2024-02-18 RX ORDER — GABAPENTIN 300 MG/1
300 CAPSULE ORAL 3 TIMES DAILY
Status: DISCONTINUED | OUTPATIENT
Start: 2024-02-18 | End: 2024-02-21 | Stop reason: HOSPADM

## 2024-02-18 RX ADMIN — GABAPENTIN 300 MG: 300 CAPSULE ORAL at 10:40

## 2024-02-18 RX ADMIN — TRAMADOL HYDROCHLORIDE 50 MG: 50 TABLET ORAL at 08:11

## 2024-02-18 RX ADMIN — CYCLOBENZAPRINE 5 MG: 10 TABLET, FILM COATED ORAL at 14:45

## 2024-02-18 RX ADMIN — TRAZODONE HYDROCHLORIDE 50 MG: 50 TABLET ORAL at 22:32

## 2024-02-18 RX ADMIN — CYCLOBENZAPRINE 5 MG: 10 TABLET, FILM COATED ORAL at 21:24

## 2024-02-18 RX ADMIN — QUETIAPINE FUMARATE 50 MG: 50 TABLET, EXTENDED RELEASE ORAL at 21:24

## 2024-02-18 RX ADMIN — HYDROXYZINE PAMOATE 50 MG: 50 CAPSULE ORAL at 08:10

## 2024-02-18 RX ADMIN — FLUOXETINE HYDROCHLORIDE 20 MG: 20 CAPSULE ORAL at 08:10

## 2024-02-18 RX ADMIN — CELECOXIB 100 MG: 100 CAPSULE ORAL at 21:24

## 2024-02-18 RX ADMIN — GABAPENTIN 300 MG: 300 CAPSULE ORAL at 21:24

## 2024-02-18 RX ADMIN — CYCLOBENZAPRINE 5 MG: 10 TABLET, FILM COATED ORAL at 08:10

## 2024-02-18 RX ADMIN — GABAPENTIN 300 MG: 300 CAPSULE ORAL at 14:45

## 2024-02-18 RX ADMIN — CELECOXIB 100 MG: 100 CAPSULE ORAL at 08:10

## 2024-02-18 ASSESSMENT — PAIN DESCRIPTION - LOCATION
LOCATION: ARM
LOCATION: BACK

## 2024-02-18 ASSESSMENT — PAIN SCALES - GENERAL
PAINLEVEL_OUTOF10: 7
PAINLEVEL_OUTOF10: 8

## 2024-02-18 ASSESSMENT — PAIN DESCRIPTION - ORIENTATION: ORIENTATION: LEFT

## 2024-02-18 ASSESSMENT — PAIN DESCRIPTION - DESCRIPTORS: DESCRIPTORS: DISCOMFORT;ACHING;THROBBING

## 2024-02-18 NOTE — CONSULTS
HISTORY AND PHYSICAL             Date: 2/14/2024        Patient Name: Elida Whitehead     YOB: 1965      Age:  58 y.o.    Chief Complaint     Chief Complaint   Patient presents with    Depression     Anxiety and depression.           History Obtained From   patient, electronic medical record    History of Present Illness   Patient 58-year-old female currently admitted for increased anxiety, depression and sleep disturbances.  Per EMR patient sees alcoholically clinic.  She reports no health medications being recently changed.  Patient denies suicide ideation.  Patient denies chest pain, palpitations, lightheadedness, headache, dizziness, shortness of breath, cough, N/V/D, and changes in appetite.  Patient also denies smoking, illicit drug, and alcohol use.  Denies self-inflicted injuries or wounds.  Patient has past medical history of asthma, COPD, depression, back pain.  Hospital provide evaluation, acute and chronic recommendations while receiving inpatient psych services.      Past Medical History     Past Medical History:   Diagnosis Date    Asthma     Chronic back pain greater than 3 months duration 2012    Dr Flynn    COPD (chronic obstructive pulmonary disease) ([facility]) 2017    Dr Hubbard    Depression 2010    Sergey Guan ([place]), hospital admissions, [facility]    Family history of malignant neoplasm of breast     H/O vitamin D deficiency     Invasive ductal carcinoma of left breast ([facility]) 05/2018    T1N0M0 ERPR+ her2-, radiation, no chemo, Dr Pearson    Spondylosis     Dr Flynn        Past Surgical History     Past Surgical History:   Procedure Laterality Date    BREAST CYST EXCISION Right 2000    benign    BREAST LUMPECTOMY      and [facility] evaluation    DILATION AND CURETTAGE OF UTERUS  2012    due to AUB    FINGER SURGERY      right index finger / exc tumor mass / at age 11    LA BX BREAST W DEVICE 1ST LESION ULTRASOUND GUIDE Left 5/11/2018    US guided left breast biopsy, excisional    LA 
32.1 02/13/2024 06:00 PM    RDW 12.9 02/13/2024 06:00 PM    LYMPHOPCT 36.9 02/13/2024 06:00 PM    MONOPCT 5.6 02/13/2024 06:00 PM    BASOPCT 0.6 02/13/2024 06:00 PM    MONOSABS 0.7 02/13/2024 06:00 PM    LYMPHSABS 4.6 02/13/2024 06:00 PM    EOSABS 0.1 02/13/2024 06:00 PM    BASOSABS 0.1 02/13/2024 06:00 PM     BMP:    Lab Results   Component Value Date/Time     02/13/2024 11:00 PM    K 3.8 02/13/2024 11:00 PM     02/13/2024 11:00 PM    CO2 25 02/13/2024 11:00 PM    BUN 9 02/13/2024 11:00 PM    LABALBU 4.0 02/13/2024 11:00 PM    CREATININE 0.49 02/13/2024 11:00 PM    CALCIUM 9.0 02/13/2024 11:00 PM    GFRAA >60.0 05/03/2019 07:13 PM    LABGLOM >60.0 02/13/2024 11:00 PM    GLUCOSE 123 02/13/2024 11:00 PM       ASSESSMENT AND PLAN    Brachial plexopathy will try small dose Gabapentin and follow.

## 2024-02-18 NOTE — GROUP NOTE
Group Therapy Note    Date: 2/18/2024    Group Start Time: 1000  Group End Time: 1050  Group Topic: Art Therapy     MLOZ 3W Marj Flannery, RIZWANAW        Group Therapy Note    Attendees: 9       Patient's Goal:  To try not to let anxiety take over.    Notes:  Patient attended the morning group art therapy session. Patient entered group and remembered she had an art task in her room that she wanted to finish. She returned to group and completed the task. Patient seemed to benefit from the intervention.     Status After Intervention:  Improved    Participation Level: Active Listener    Participation Quality: Appropriate      Speech:  normal      Thought Process/Content: Logical      Affective Functioning: Congruent      Mood: anxious      Level of consciousness:  Alert      Response to Learning: Able to verbalize current knowledge/experience      Endings: None Reported    Modes of Intervention: Activity      Discipline Responsible: Psychoeducational Specialist      Signature:  Marj Jamil MA ATR

## 2024-02-18 NOTE — FLOWSHEET NOTE
Pt visible on unit and socializing with peers. Pt denies SI,HI,AVH, Pt rates anxiety and depression 6/10  Pt reports good sleep. Pt cooperative and friendly. Pt attend group and participates

## 2024-02-19 PROCEDURE — 6370000000 HC RX 637 (ALT 250 FOR IP): Performed by: REGISTERED NURSE

## 2024-02-19 PROCEDURE — 6370000000 HC RX 637 (ALT 250 FOR IP): Performed by: PSYCHIATRY & NEUROLOGY

## 2024-02-19 PROCEDURE — 90833 PSYTX W PT W E/M 30 MIN: CPT | Performed by: PSYCHIATRY & NEUROLOGY

## 2024-02-19 PROCEDURE — 99232 SBSQ HOSP IP/OBS MODERATE 35: CPT | Performed by: PSYCHIATRY & NEUROLOGY

## 2024-02-19 PROCEDURE — 6370000000 HC RX 637 (ALT 250 FOR IP): Performed by: PAIN MEDICINE

## 2024-02-19 PROCEDURE — 1240000000 HC EMOTIONAL WELLNESS R&B

## 2024-02-19 RX ORDER — QUETIAPINE FUMARATE 50 MG/1
100 TABLET, EXTENDED RELEASE ORAL NIGHTLY
Status: DISCONTINUED | OUTPATIENT
Start: 2024-02-19 | End: 2024-02-20

## 2024-02-19 RX ADMIN — TRAZODONE HYDROCHLORIDE 50 MG: 50 TABLET ORAL at 22:41

## 2024-02-19 RX ADMIN — CYCLOBENZAPRINE 5 MG: 10 TABLET, FILM COATED ORAL at 21:10

## 2024-02-19 RX ADMIN — CYCLOBENZAPRINE 5 MG: 10 TABLET, FILM COATED ORAL at 09:34

## 2024-02-19 RX ADMIN — GABAPENTIN 300 MG: 300 CAPSULE ORAL at 09:36

## 2024-02-19 RX ADMIN — CYCLOBENZAPRINE 5 MG: 10 TABLET, FILM COATED ORAL at 14:12

## 2024-02-19 RX ADMIN — ACETAMINOPHEN 325MG 650 MG: 325 TABLET ORAL at 12:35

## 2024-02-19 RX ADMIN — QUETIAPINE FUMARATE 100 MG: 50 TABLET, EXTENDED RELEASE ORAL at 21:09

## 2024-02-19 RX ADMIN — MAGNESIUM HYDROXIDE 30 ML: 1200 LIQUID ORAL at 16:36

## 2024-02-19 RX ADMIN — GABAPENTIN 300 MG: 300 CAPSULE ORAL at 21:10

## 2024-02-19 RX ADMIN — GABAPENTIN 300 MG: 300 CAPSULE ORAL at 14:11

## 2024-02-19 RX ADMIN — CELECOXIB 100 MG: 100 CAPSULE ORAL at 09:36

## 2024-02-19 RX ADMIN — CELECOXIB 100 MG: 100 CAPSULE ORAL at 21:10

## 2024-02-19 RX ADMIN — TRAMADOL HYDROCHLORIDE 50 MG: 50 TABLET ORAL at 09:34

## 2024-02-19 RX ADMIN — FLUOXETINE HYDROCHLORIDE 20 MG: 20 CAPSULE ORAL at 09:37

## 2024-02-19 RX ADMIN — ACETAMINOPHEN 325MG 650 MG: 325 TABLET ORAL at 22:41

## 2024-02-19 ASSESSMENT — PAIN SCALES - GENERAL
PAINLEVEL_OUTOF10: 0
PAINLEVEL_OUTOF10: 0
PAINLEVEL_OUTOF10: 5
PAINLEVEL_OUTOF10: 7
PAINLEVEL_OUTOF10: 6
PAINLEVEL_OUTOF10: 7

## 2024-02-19 ASSESSMENT — PAIN DESCRIPTION - DESCRIPTORS
DESCRIPTORS: ACHING
DESCRIPTORS: ACHING;SQUEEZING;TIGHTNESS
DESCRIPTORS: ACHING;TIGHTNESS
DESCRIPTORS: ACHING
DESCRIPTORS: ACHING

## 2024-02-19 ASSESSMENT — PAIN DESCRIPTION - LOCATION
LOCATION: BACK;OTHER (COMMENT)
LOCATION: HEAD
LOCATION: ARM
LOCATION: BACK;OTHER (COMMENT)
LOCATION: HEAD

## 2024-02-19 ASSESSMENT — PAIN DESCRIPTION - ORIENTATION
ORIENTATION: LEFT
ORIENTATION: LEFT

## 2024-02-19 ASSESSMENT — PAIN SCALES - WONG BAKER: WONGBAKER_NUMERICALRESPONSE: 0

## 2024-02-19 NOTE — GROUP NOTE
Group Therapy Note    Date: 2/19/2024    Group Start Time: 1000  Group End Time: 1050  Group Topic: Art Therapy     MLDEISY 3W Marj Flannery, RIZWANAW        Group Therapy Note    Attendees: 7       Patient's Goal:  To talk to the doctor.    Notes:  Patient did not attend.    Modes of Intervention: Activity      Discipline Responsible: Psychoeducational Specialist      Signature:  Marj HOLDER

## 2024-02-20 ENCOUNTER — TELEPHONE (OUTPATIENT)
Dept: FAMILY MEDICINE CLINIC | Age: 59
End: 2024-02-20

## 2024-02-20 PROCEDURE — 6370000000 HC RX 637 (ALT 250 FOR IP): Performed by: REGISTERED NURSE

## 2024-02-20 PROCEDURE — 99232 SBSQ HOSP IP/OBS MODERATE 35: CPT | Performed by: PSYCHIATRY & NEUROLOGY

## 2024-02-20 PROCEDURE — 6370000000 HC RX 637 (ALT 250 FOR IP): Performed by: PSYCHIATRY & NEUROLOGY

## 2024-02-20 PROCEDURE — 6370000000 HC RX 637 (ALT 250 FOR IP): Performed by: PAIN MEDICINE

## 2024-02-20 PROCEDURE — 1240000000 HC EMOTIONAL WELLNESS R&B

## 2024-02-20 RX ORDER — TRAZODONE HYDROCHLORIDE 50 MG/1
50 TABLET ORAL NIGHTLY
Status: DISCONTINUED | OUTPATIENT
Start: 2024-02-20 | End: 2024-02-21 | Stop reason: HOSPADM

## 2024-02-20 RX ORDER — QUETIAPINE FUMARATE 50 MG/1
50 TABLET, EXTENDED RELEASE ORAL NIGHTLY
Status: DISCONTINUED | OUTPATIENT
Start: 2024-02-20 | End: 2024-02-20

## 2024-02-20 RX ADMIN — CELECOXIB 100 MG: 100 CAPSULE ORAL at 18:40

## 2024-02-20 RX ADMIN — HYDROXYZINE PAMOATE 50 MG: 50 CAPSULE ORAL at 13:01

## 2024-02-20 RX ADMIN — FLUOXETINE HYDROCHLORIDE 30 MG: 20 CAPSULE ORAL at 09:41

## 2024-02-20 RX ADMIN — CELECOXIB 100 MG: 100 CAPSULE ORAL at 09:40

## 2024-02-20 RX ADMIN — GABAPENTIN 300 MG: 300 CAPSULE ORAL at 21:23

## 2024-02-20 RX ADMIN — TRAMADOL HYDROCHLORIDE 50 MG: 50 TABLET ORAL at 09:40

## 2024-02-20 RX ADMIN — HYDROXYZINE PAMOATE 50 MG: 50 CAPSULE ORAL at 19:34

## 2024-02-20 RX ADMIN — CYCLOBENZAPRINE 5 MG: 10 TABLET, FILM COATED ORAL at 21:23

## 2024-02-20 RX ADMIN — CYCLOBENZAPRINE 5 MG: 10 TABLET, FILM COATED ORAL at 14:14

## 2024-02-20 RX ADMIN — TRAZODONE HYDROCHLORIDE 50 MG: 50 TABLET ORAL at 21:23

## 2024-02-20 RX ADMIN — CYCLOBENZAPRINE 5 MG: 10 TABLET, FILM COATED ORAL at 09:41

## 2024-02-20 RX ADMIN — GABAPENTIN 300 MG: 300 CAPSULE ORAL at 09:41

## 2024-02-20 RX ADMIN — GABAPENTIN 300 MG: 300 CAPSULE ORAL at 14:14

## 2024-02-20 ASSESSMENT — PAIN DESCRIPTION - ORIENTATION
ORIENTATION: LEFT
ORIENTATION: LEFT

## 2024-02-20 ASSESSMENT — PAIN SCALES - GENERAL
PAINLEVEL_OUTOF10: 0
PAINLEVEL_OUTOF10: 7
PAINLEVEL_OUTOF10: 4
PAINLEVEL_OUTOF10: 7

## 2024-02-20 ASSESSMENT — PAIN DESCRIPTION - LOCATION
LOCATION: BACK;ARM
LOCATION: BACK;ARM
LOCATION: ARM

## 2024-02-20 ASSESSMENT — PAIN DESCRIPTION - DESCRIPTORS
DESCRIPTORS: ACHING
DESCRIPTORS: ACHING
DESCRIPTORS: CRAMPING;NUMBNESS

## 2024-02-20 NOTE — TELEPHONE ENCOUNTER
LM on [facility] [phone] with appointment date and time 3/5/2024 new pt with Castro

## 2024-02-20 NOTE — GROUP NOTE
Group Therapy Note    Date: 2/20/2024    Group Start Time: 1400  Group End Time: 1500  Group Topic: Healthy Living/Wellness    MLOZ 3W Anayeli Ramey, RN        Group Therapy Note    Attendees: 9/22         Patient's Goal:  to increase socialization skills  Status After Intervention:  Improved    Participation Level: Active Listener    Participation Quality: Appropriate      Speech:  normal      Thought Process/Content: Logical      Affective Functioning: Congruent      Mood: euthymic      Level of consciousness:  Alert and Oriented x4      Response to Learning: Capable of insight      Endings: None Reported    Modes of Intervention: Support and Socialization      Discipline Responsible: Registered Nurse      Signature:  Anayeli Lema, RN

## 2024-02-20 NOTE — TELEPHONE ENCOUNTER
Kade from to [facility] called and is needing to schedule patient with Castro Lowry, at her request, she is an out patient.    Please call Kade back to schedule.

## 2024-02-21 VITALS
BODY MASS INDEX: 23.92 KG/M2 | RESPIRATION RATE: 18 BRPM | HEART RATE: 89 BPM | HEIGHT: 63 IN | SYSTOLIC BLOOD PRESSURE: 110 MMHG | OXYGEN SATURATION: 97 % | WEIGHT: 135 LBS | TEMPERATURE: 97.3 F | DIASTOLIC BLOOD PRESSURE: 73 MMHG

## 2024-02-21 PROCEDURE — 6370000000 HC RX 637 (ALT 250 FOR IP): Performed by: REGISTERED NURSE

## 2024-02-21 PROCEDURE — 6370000000 HC RX 637 (ALT 250 FOR IP): Performed by: PAIN MEDICINE

## 2024-02-21 PROCEDURE — 6370000000 HC RX 637 (ALT 250 FOR IP): Performed by: PSYCHIATRY & NEUROLOGY

## 2024-02-21 PROCEDURE — 99239 HOSP IP/OBS DSCHRG MGMT >30: CPT | Performed by: PSYCHIATRY & NEUROLOGY

## 2024-02-21 RX ORDER — FLUOXETINE 10 MG/1
30 CAPSULE ORAL DAILY
Qty: 45 CAPSULE | Refills: 3 | Status: SHIPPED | OUTPATIENT
Start: 2024-02-21

## 2024-02-21 RX ORDER — HYDROXYZINE PAMOATE 50 MG/1
50 CAPSULE ORAL EVERY 8 HOURS PRN
Qty: 30 CAPSULE | Refills: 1 | Status: SHIPPED | OUTPATIENT
Start: 2024-02-21

## 2024-02-21 RX ORDER — GABAPENTIN 300 MG/1
300 CAPSULE ORAL 3 TIMES DAILY
Qty: 45 CAPSULE | Refills: 1 | Status: SHIPPED | OUTPATIENT
Start: 2024-02-21 | End: 2024-03-22

## 2024-02-21 RX ORDER — TRAZODONE HYDROCHLORIDE 50 MG/1
50 TABLET ORAL NIGHTLY
Qty: 15 TABLET | Refills: 2 | Status: SHIPPED | OUTPATIENT
Start: 2024-02-21

## 2024-02-21 RX ADMIN — FLUOXETINE HYDROCHLORIDE 30 MG: 20 CAPSULE ORAL at 09:29

## 2024-02-21 RX ADMIN — GABAPENTIN 300 MG: 300 CAPSULE ORAL at 14:39

## 2024-02-21 RX ADMIN — TRAMADOL HYDROCHLORIDE 50 MG: 50 TABLET ORAL at 09:29

## 2024-02-21 RX ADMIN — GABAPENTIN 300 MG: 300 CAPSULE ORAL at 09:29

## 2024-02-21 RX ADMIN — HYDROXYZINE PAMOATE 50 MG: 50 CAPSULE ORAL at 09:58

## 2024-02-21 RX ADMIN — HYDROXYZINE PAMOATE 50 MG: 50 CAPSULE ORAL at 15:27

## 2024-02-21 RX ADMIN — ACETAMINOPHEN 325MG 650 MG: 325 TABLET ORAL at 14:39

## 2024-02-21 RX ADMIN — CELECOXIB 100 MG: 100 CAPSULE ORAL at 09:29

## 2024-02-21 RX ADMIN — CYCLOBENZAPRINE 5 MG: 10 TABLET, FILM COATED ORAL at 14:38

## 2024-02-21 RX ADMIN — CYCLOBENZAPRINE 5 MG: 10 TABLET, FILM COATED ORAL at 09:28

## 2024-02-21 ASSESSMENT — PAIN SCALES - GENERAL
PAINLEVEL_OUTOF10: 0
PAINLEVEL_OUTOF10: 0
PAINLEVEL_OUTOF10: 5
PAINLEVEL_OUTOF10: 7

## 2024-02-21 ASSESSMENT — PAIN DESCRIPTION - ORIENTATION
ORIENTATION: LEFT
ORIENTATION: LEFT

## 2024-02-21 ASSESSMENT — PAIN DESCRIPTION - DESCRIPTORS: DESCRIPTORS: ACHING

## 2024-02-21 ASSESSMENT — PAIN DESCRIPTION - LOCATION
LOCATION: BACK;ARM
LOCATION: BACK;ARM

## 2024-02-21 ASSESSMENT — PAIN SCALES - WONG BAKER: WONGBAKER_NUMERICALRESPONSE: 0

## 2024-02-21 NOTE — GROUP NOTE
Group Therapy Note    Date: 2/21/2024    Group Start Time: 1000  Group End Time: 1050  Group Topic: Art Therapy     MLOZ 3W Marj Flannery, SUSHILA        Group Therapy Note    Attendees: 10       Patient's Goal:  To return home safely today.    Notes:  Patient attended the morning group art therapy. She worked on an origami task with her peers and therapist. Patient seemed to enjoy the interactions with others.     Status After Intervention:  Improved    Participation Level: Active Listener    Participation Quality: Appropriate      Speech:  normal      Thought Process/Content: Logical      Affective Functioning: Congruent      Mood: elevated      Level of consciousness:  Alert      Response to Learning: Able to verbalize current knowledge/experience      Endings: None Reported    Modes of Intervention: Activity      Discipline Responsible: Psychoeducational Specialist      Signature:  Marj HOLDER

## 2024-02-21 NOTE — TRANSITION OF CARE
sulfate  (90 Base) MCG/ACT inhaler  Commonly known as: ProAir HFA     azelastine 0.1 % nasal spray  Commonly known as: ASTELIN     lidocaine 5 %  Commonly known as: LIDODERM     MULTIVITAMIN PO     ondansetron 4 MG disintegrating tablet  Commonly known as: Zofran ODT     pregabalin 50 MG capsule  Commonly known as: LYRICA     Symbicort 80-4.5 MCG/ACT Aero  Generic drug: budesonide-formoterol     tiZANidine 4 MG tablet  Commonly known as: ZANAFLEX               Where to Get Your Medications        These medications were sent to [facility] - [place], [place] - [address] - P [phone] - F [phone]  [address] [place] [address] [place] [address]      Phone: [phone]   FLUoxetine 10 MG capsule  gabapentin 300 MG capsule  hydrOXYzine pamoate 50 MG capsule  traZODone 50 MG tablet         Unresulted Labs (24h ago, onward)      None            To obtain results of studies pending at discharge, please contact     Follow-up Information       Follow up With Specialties Details Why Contact Info    Castro Lwory, MAURICIO - CNP Psychiatry Follow up on 3/5/2024 2:30pm for hospital follow up and then be scheduled for therapy [address] [place] [address]  [phone]      [facility] Follow up call to schedule with providers or attend same day access Monday through Thursday from 8am to 2pm and Friday from 8am to 1pm [address]   [place] [address] [phone]               Advanced Directive:   Does the patient have an appointed surrogate decision maker? No  Does the patient have a Medical Advance Directive? No  Does the patient have a Psychiatric Advance Directive? No  If the patient does not have a surrogate or Medical Advance Directive AND Psychiatric Advance Directive, the patient was offered information on these advance directives Patient declined to complete    Patient Instructions: Please continue all medications until otherwise directed by physician.      Tobacco Cessation

## 2024-02-21 NOTE — DISCHARGE INSTRUCTIONS
Someone from [facility] will be calling you tomorrow to follow up on your care. If you don't hear from us, give us a call! [phone].    Keep all follow up appointments, take medications as ordered, utilize positive supports, abstain from use of alcohol and drugs. If symptoms return or you feel at risk to yourself or others, please call 911, return the nearest emergency room, or call your local crisis hotline:  [facility]: [phone]  [facility]: [phone]  [facility]: [phone]    Due to the Covid-19 Pandemic, [facility]essation [facility] is not currently available. For assistance with quitting smoking please go to https://smokefree.gov. A prescription for an FDA-approved tobacco cessation medication was offered at discharge and the patient refused.    You were offered a flu vaccine but declined

## 2024-02-21 NOTE — DISCHARGE INSTR - DIET
Good nutrition is important when healing from an illness, injury, or surgery.  Follow any nutrition recommendations given to you during your hospital stay.   If you were given an oral nutrition supplement while in the hospital, continue to take this supplement at home.  You can take it with meals, in-between meals, and/or before bedtime. These supplements can be purchased at most local grocery stores, pharmacies, and chain [employer]-stores.   If you have any questions about your diet or nutrition, call the hospital and ask for the dietitian.  Resume as tolerated
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
## 2024-02-21 NOTE — PROGRESS NOTES
[facility] FOLLOW-UP NOTE       2/18/2024     Patient was seen and examined in person, Chart reviewed   Patient's case discussed with staff/team    Chief Complaint: SI Depression    Interim History:   \"I am slowly beginning to feel better\"   Broken sleep  Delayed responses  Depressed, states is improved from admission   Denies SI, HI AVH denies any paranoia however remains isolative to room   Feeling more optimistic today  Blunt affect, sad  Poor appetite   Caring for ADLs good hygiene and grooming   Poor motivation and energy    Appetite:   [] Normal/Unchanged  [x] Increased  [] Decreased      Sleep:       [] Normal/Unchanged  [] Fair       [x] Poor              Energy:    [] Normal/Unchanged  [] Increased  [x] Decreased        SI [] Present  [x] Absent    HI  []Present  [x] Absent     Aggression:  [] yes  [x] no    Patient is [] able  [x] unable to CONTRACT FOR SAFETY     PAST MEDICAL/PSYCHIATRIC HISTORY:   Past Medical History:   Diagnosis Date    Asthma     Chronic back pain greater than 3 months duration 2012    Dr Flynn    COPD (chronic obstructive pulmonary disease) ([facility]) 2017    Dr Hubbard    Depression 2010    Sergey Guan ([place]), hospital admissions, counseling [facility]    Family history of malignant neoplasm of breast     H/O vitamin D deficiency     Invasive ductal carcinoma of left breast ([facility]) 05/2018    T1N0M0 ERPR+ her2-, radiation, no chemo, Dr Pearson    Spondylosis     Dr Flynn       FAMILY/SOCIAL HISTORY:  Family History   Problem Relation Age of Onset    Heart Failure Mother         dec 84    Osteoarthritis Mother     Cancer Mother 60        breast     Breast Cancer Mother     Heart Attack Father         dec 62    Hypertension Father     High Cholesterol Father     Stroke Sister     No Known Problems Son      Social History     Socioeconomic History    Marital status: Single     Spouse name: Not on file    Number of children: 2    Years of education: 11 
               [facility]  BEHAVIORAL HEALTH FOLLOW-UP NOTE       2/17/2024     Patient was seen and examined in person, Chart reviewed   Patient's case discussed with staff/team    Chief Complaint: SI Depression    Interim History:   \"I am slowly beginning to feel better\"   Broken sleep  Depressed, no changes   Denies SI, HI AVH; continues to endorse paranoia  Endorses anxiety 5/10, reports vistaril helpful  Continues to feel hopeless, helpless, negative and self defeating thoughts - not optimistic that she will improve.   Flat affect, sad  Poor appetite, recently lost a lot of weight, appx 15lbs in 1 month  Caring for ADLs   Poor motivation and energy    Appetite:   [] Normal/Unchanged  [x] Increased  [] Decreased      Sleep:       [] Normal/Unchanged  [] Fair       [x] Poor              Energy:    [] Normal/Unchanged  [] Increased  [x] Decreased        SI [] Present  [x] Absent    HI  []Present  [x] Absent     Aggression:  [] yes  [x] no    Patient is [] able  [x] unable to CONTRACT FOR SAFETY     PAST MEDICAL/PSYCHIATRIC HISTORY:   Past Medical History:   Diagnosis Date    Asthma     Chronic back pain greater than 3 months duration 2012    Dr Flynn    COPD (chronic obstructive pulmonary disease) ([facility]) 2017    Dr Hubbard    Depression 2010    Sergey Guan ([place]), hospital admissions, counseling [facility]    Family history of malignant neoplasm of breast     H/O vitamin D deficiency     Invasive ductal carcinoma of left breast ([facility]) 05/2018    T1N0M0 ERPR+ her2-, radiation, no chemo, Dr Pearson    Spondylosis     Dr Flynn       FAMILY/SOCIAL HISTORY:  Family History   Problem Relation Age of Onset    Heart Failure Mother         dec 84    Osteoarthritis Mother     Cancer Mother 60        breast     Breast Cancer Mother     Heart Attack Father         dec 62    Hypertension Father     High Cholesterol Father     Stroke Sister     No Known Problems Son      Social History     Socioeconomic History    Marital 
      Behavioral Services  Medicare Certification Upon Admission    I certify that this patient's inpatient psychiatric hospital admission is medically necessary for:    [x] (1) Treatment which could reasonably be expected to improve this patient's condition,       [x] (2) Or for diagnostic study;     AND     [x](2) The inpatient psychiatric services are provided while the individual is under the care of a physician and are included in the individualized plan of care.    Estimated length of stay/service 5-7 days    Plan for post-hospital care home with OP services     Electronically signed by MAURICIO Tirado CNP on 2/14/2024 at 10:06 AM      
   02/14/24 1300   RT Protocol   History Pulmonary Disease 2   Respiratory pattern 0   Breath sounds 0   Cough 0   Indications for Bronchodilator Therapy Decreased or absent breath sounds   Bronchodilator Assessment Score 2       
  Morning Community Meeting Topics    Elida Whitehead attended the morning community meeting on 2/17/24.    Topics discussed today     [x] Introduction  Day of the week and date  Mask distribution  Current mask requirements  [x]Teams  Explanation of  Green and Blue team criteria  Nurses assigned to each team for today  Explanation about green and blue paper  Date  Patient's Name  Patient's Nurse  Goals  [x] Visitation  Announce the visiting hours for the day  Announce which team is allowed to have visitors for the day  Review any updated Covid 19 requirements for visitors during visitation  Vaccine Card or negative Covid test within 48 hours of visit  State Identification  Patients are reminded to alert the  at least 1 hour before visitation   [x] Unit Orientation  Coffee use  Phone location and etiquette  Shower locations  Washer and dryer location and process  Common area expectations  Staff rounds expectation  [x] Meals   Educate patient to the menu  The patient is encouraged to fill out the menu to get preferences at mealtime  The patient is educated that if they do not fill out the menu, they will get the standard tray  The coffee pot is decaf, patient encouraged to order regular coffee from menu.  Educate patient to the meal process  Patient encouraged to eat snacks provided twice daily  Snacks may stay in patient room     [x] Discharge Process  Discharge expectations  Fill out the survey after discharge   [x] Hygiene  Daily showers encouraged  Showers availability discussed   Daily dressing encouraged  Discussed wearing street clothing  Education provided on where to place linens and clothing  Linens in the hamper  personal clothing does not go into the linen hamper  [x] Group   Patient encouraged to attend group provided  Time of Group Meetings discussed  Gentle reminder that attendance is a Physician order  [x] Movement  Chair exercises completed  Stretching completed  Notes: GOAL: \" TO FEEL 
  Morning Community Meeting Topics    Elida Whitehead attended the morning community meeting on 2/20/24.    Topics discussed today     [x] Introduction  Day of the week and date  Mask distribution  Current mask requirements  [x]Teams  Explanation of  Green and Blue team criteria  Nurses assigned to each team for today  Explanation about green and blue paper  Date  Patient's Name  Patient's Nurse  Goals  [x] Visitation  Announce the visiting hours for the day  Announce which team is allowed to have visitors for the day  Review any updated Covid 19 requirements for visitors during visitation  Vaccine Card or negative Covid test within 48 hours of visit  State Identification  Patients are reminded to alert the  at least 1 hour before visitation   [x] Unit Orientation  Coffee use  Phone location and etiquette  Shower locations  Washer and dryer location and process  Common area expectations  Staff rounds expectation  [x] Meals   Educate patient to the menu  The patient is encouraged to fill out the menu to get preferences at mealtime  The patient is educated that if they do not fill out the menu, they will get the standard tray  The coffee pot is decaf, patient encouraged to order regular coffee from menu.  Educate patient to the meal process  Patient encouraged to eat snacks provided twice daily  Snacks may stay in patient room     [x] Discharge Process  Discharge expectations  Fill out the survey after discharge   [x] Hygiene  Daily showers encouraged  Showers availability discussed   Daily dressing encouraged  Discussed wearing street clothing  Education provided on where to place linens and clothing  Linens in the hamper  personal clothing does not go into the linen hamper  [x] Group   Patient encouraged to attend group provided  Time of Group Meetings discussed  Gentle reminder that attendance is a Physician order  [x] Movement  Chair exercises completed  Stretching completed  Notes:   
Assumed care of pt at 2330  
CLINICAL PHARMACY NOTE: MEDS TO BEDS    Total # of Prescriptions Filled: 4   The following medications were delivered to the patient:  Trazodone 50 mg tab  Hydroxyzine Pamoate 50 mg cap  Gabapentin 300 mg cap  Fluoxetine 10 mg cap    Additional Documentation:    
Comprehensive Nutrition Assessment    Type and Reason for Visit:  Positive Nutrition Screen    Nutrition Recommendations/Plan:   Continue General diet as tolerated  Begin Clear oral supplement @ B and high calorie high protein @ D until appetite returns to baseline  Ordered actual wt for further assessment     Malnutrition Assessment:  Malnutrition Status:  No malnutrition (02/15/24 0942)        Nutrition Assessment:     Nutrition Referral received for decreased oral intake & / or reported weight loss . No malnutrition present based on available information. Pt currently admitted on behavioral health floor. Anticipate improvement in oral intake with inpatient psychiatric treatment ,medication compliance &/or  abstinence from substance abuse. General diet appropriate at this time. Actual weight ordered for further assessment. No recent EMR weights to review x 4 years, however weight has been trending down x  6 years. Will begin and oral nutrition supplement to aid in meeting energy and protein needs, until appetite improves. .      Nutrition Related Findings:    \" admitted for increased anxiety, depression and sleep disturbances..past medical history of asthma, COPD, depression, back pain. \",  Per psychiatry \"Decreased appetite and states she has lost 15+ pounds since Thanksgiving.\",  limited EMR weights available for the last 4 years Wound Type: None       Current Nutrition Intake & Therapies:    Average Meal Intake:  (' decreased ' per BHI flowsheet)  Average Supplements Intake: None Ordered  ADULT DIET; Regular    Anthropometric Measures:  Height: 160 cm (5' 3\")  Ideal Body Weight (IBW): 115 lbs (52 kg)    Admission Body Weight: 61.2 kg (135 lb) (stated)  Current Body Weight: 61.2 kg (135 lb), 117.4 % IBW. Weight Source: Stated  Current BMI (kg/m2): 23.9  Usual Body Weight: 67.6 kg (149 lb) (2020; 175# - 2018)  % Weight Change (Calculated): -9.4                    BMI Categories: Normal Weight (BMI 
Explained and gave am meds flexeril 5mg ,ultram 50 mg #8 pain , vistaril for anx# 8  
Explained and gave am meds, flexeril 5mg  pt is requesting tylenol and vistaril for anxiety  
Explained and gave am meds, gave ultram 50 flexeril 5mg and Neurontin 300 mg  
Explained and gave am meds, pt requested ultram 50mg , neurontin 300mg and 1/2 tab of flexeril 5mg, pt just out from seeing the doctor, pt asked for anxiety med vistaril , to be given.  
Explained and gave am meds, pt was agreeable, took tylenol for now, states she takes ultram at home, flexeril, Neurontin, that she had lymph nodes removed in left underarm area, pt reports she has not follow up since then, No Bp in left arm per pt, pt is tearful when talking about this. Groups were encouraged.  
Explained and gave flexeril 5 mg and tylenol for back and left under the arm pit area pain., reviewed new med for sleep , as pt mentioned she is hopeful for this new med to help with anxiety and sleep.   
Explained and gave newly ordered daily ultram 50mg , 5 mg flexeril and tylenol , reports constant nerve pain under left arm, and lower back pain pt aware of Celebrex to be given . Vistaril appears to be helpful , pt appears calm,   
Found pt in dinning room sitting with peers,explained and gave am meds , pt requested ultram 50mg , flexeril 5mg given, neurontin, 300 that pt reports is helping her nerve pain under her arm, pt reports depression #4, anxiety #5, denied any suicidal thoughts voices, reports waking up frequently,  
Gave Neurontin 300 mg and flexeril  5mg    
Gave Neurontin 300 mg and flexeril  5mg .   
Gave flexeril 5 mg and Neurontin 300 mg pt just out of group  
Gave flexeril 5 mg as ordered,  
Gave ultram 50mg as requested, pt reminded she has to ask for it, pt stated she would like to have every morning  
Gave vistaril for generalized anxiety , pt reports she got unorganized and feels her meds got all mixed up.  
Leisure Assessment    Approached pt for leisure assessment. Pt is pleasant and appears well groomed. Makes F+ eye contact, gives short answers and appears confused at times, requiring redirection.     She states going for walks when the weather allows and goes out weekly if she can. She also enjoys reading regularly. She states she enjoys spending time with her grandchildren, who range from 4 months to 20 years. She reports feeling mostly stressed out due to her admission and feels that her anxiety is her primary limiter.     Electronically signed by CEE Tolliver/L on 2/19/24 at 5:39 PM EST     
Met with patient in person. ECT explained. [facility] Handout given. Pt states she has only ever heard bad things about ECT but will look over the information. Pt denied having any questions.   
PRN vistaril administered for complaints of 5/10 anxiety.  PRN ULTRAM administered for complaints of 8/10 pain in left arm.   Electronically signed by Freya Wright LPN on 2/18/2024 at 8:15 AM      
PT did not attend group  
PT did not attend group  
PT. DID NOT ATTEND 0900 COMMUNITY MEETING AND STRETCHING DESPITE STAFF ENCOURAGEMENT.          Electronically signed by Angela Puentes on 2/15/2024 at 10:28 AM   
PT. DID NOT ATTEND 1000 A.M ACTIVITY GROUP DESPITE STAFF ENCOURAGEMENT.          Electronically signed by Angela Puentes on 2/15/2024 at 11:34 AM   
PT. DID NOT ATTEND 1000 A.M ACTIVITY GROUP DESPITE STAFF ENCOURAGEMENT.          Electronically signed by Angela Puentes on 2/17/2024 at 11:35 AM   
Patient arrived to the unit via wheelchair accompanied by staff. Skin assessment and contraband search complete by this nurse and Lindsay ESCOBEDO. No contraband found   
Patient attended and participated in [id number] Recreation Group        Electronically signed by Eleni Bailon on 2/20/2024 at 9:14 PM   
Patient attended and participated in [id number] Healthy Living Meeting        Electronically signed by Eleni Bailon on 2/20/2024 at 5:39 PM   
Patient attended and participated in [id number] Healthy Living Meeting        Electronically signed by Nerissa Dwyer on 2/16/2024 at 7:18 PM   
Patient attended and participated in wrap-up group   
Patient attended and participated in wrap-up group   
Patient requested/ received PRN Vistaril for anxiety. Now rates anxiety 6/10.   
Patient to desk and insists I get in touch with the doctor and tell him vistaril 50 mg is not a high enough dose for her. She states an hour after taking the medication she still feels increased anxiety.  Patient urged to participate in other activities to reduce anxiety, such as listening to music. She was also advised that when her anxiety is really high, it may take more than one dose to get relief.  Patient insists the vistaril 50 mg needs to be raised and that I contact the doctor. Dr. Manriquez was notified.     1529  Dr Manriquez responded and advised patient is on multiple other medications and no changes to vistaril will be made at this time.  Patient notified and advised, \"I'll just ask Castro\".      1536  Patient approached nurses station asking this nurse to call her daughter in law, Gena Balderrama.  Gena was called and advised she wants to speak with Dr. Manriquez re: vistaril dosage.  Stating, we are setting the patient up to fail by not increasing the dosage.  As it stands, she says, She is not ready to come home without an increase in medication.  I advised Gena she may need to speak with social work regarding her concerns.  She states, \"I know Dr. 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  Message sent to Dr. Manriquez explaining the situation.       1556 Spoke with Dr Manriquez who advised the vistaril 50 mg po q6 hours was the max dose and patient could have discharge cancelled if she was not ready for discharge.  This nurse spoke with patient who advised this dosage was ok and she would continue on with discharge.    
Patient visible on unit and social with select peers. Patient calm and cooperative. Patient continues to report pain to L arm. Pain management came and assessed patient. New orders for Gabapentin received, see MAR. Patient continues to endorse anxiety, however patient reports vistaril helps. Patient is able to make needs known. Patient continues to maintain safety while on unit.   Electronically signed by Freya Wright LPN on 2/18/2024 at 1:18 PM      
Pt did not attend [id number] Wrap Up Group despite staff encouragement        Electronically signed by Nerissa Dwyer on 2/16/2024 at 9:15 PM   
Pt is noted up on the unit, is now in her room, gave vistaril for anxiety #5 as requested, stated she is getting a headache and doesn't know why, reviewed night meds per pt request and pt voiced understanding. Pt reports depression 4, denied any suicidal thoughts, voices , reports poor sleep.  
Pt is requesting a different antibiotic , has trouble swallowing refused offer for crushed with apples ause.  
Pt just out from seeing benny , gave vistaril 50mg as pt asked, pt expressed irritation, stated I was getting vistaril more frequently when I  first came in reminded pt again some meds are prn and she needs to ask for them.   
Pt medicated per request with PRN vistaril for 5-6 anxiety.   
Pt remains sleeping, resp even unlabored, hob up 30 degrees   
Pt requested vistaril 50mg for anxiety #5.  
Pt. refused to attend the 0900 community meeting.  
Report from Lindsay ESCOBEDO in the [facility]. Elida Whitehead is a 58 y.o. female who presented to the emergency department for anxiety, depression, and sleep disturbance. Patient states she has had increased anxiety and depression since October. States her mind has been racing and some paranoia that started in January. States she has been paranoid to leave her house or go to the stores. Decreased interest in things she use to like to do, no motivation to get out of bed. States she feels uneasy at her house. Patient states she was started on Prozac 20 mg two weeks ago. Patient Denies SI/HI and auditory hallucinations. Decreased appetite and states she has lost 15+ pounds since Thanksgiving. Trouble falling asleep and staying asleep  Discharged from [facility] today 2/13/24 due to no show to appointment   Tox- +THC  
       dec 63    Hypertension Father     High Cholesterol Father     Stroke Sister     No Known Problems Son      Social History     Socioeconomic History    Marital status: Single     Spouse name: Not on file    Number of children: 2    Years of education: 11    Highest education level: Not on file   Occupational History    Occupation: , now on [employer]   Tobacco Use    Smoking status: Former     Current packs/day: 0.00     Average packs/day: 1 pack/day for 20.0 years (20.0 ttl pk-yrs)     Types: Cigarettes     Start date: 1997     Quit date: 2017     Years since quittin.2    Smokeless tobacco: Never   Vaping Use    Vaping Use: Never used   Substance and Sexual Activity    Alcohol use: Yes     Alcohol/week: 0.0 standard drinks of alcohol     Comment: Hasn't had any alcohol since 2018    Drug use: No    Sexual activity: Not Currently   Other Topics Concern    Not on file   Social History Narrative    Born in [place], one of 3 siblings, 2 half sisters (does not talk to them since )    Single, , 2 sons, in [place] and [place]    Grandchildren 5, does some babysitting    Lives in a mobil home in [place] alone    Worked as a  x 15 years    Buddhist, attends [employer]     On Medicare for depression x      Social Determinants of Health     Financial Resource Strain: Not on file   Food Insecurity: Not on file   Transportation Needs: Not on file   Physical Activity: Not on file   Stress: Not on file   Social Connections: Not on file   Intimate Partner Violence: Not on file   Housing Stability: Not on file           ROS:  [x] All negative/unchanged except if checked. Explain positive(checked items) below:  [] Constitutional  [] Eyes  [] Ear/Nose/Mouth/Throat  [] Respiratory  [] CV  [] GI  []   [] Musculoskeletal  [] Skin/Breast  [] Neurological  [] Endocrine  [] Heme/Lymph  [] Allergic/Immunologic    Explanation:     MEDICATIONS:    Current Facility-Administered 
300 mg, Oral, TID, Shant Hernández MD, 300 mg at 02/20/24 0941    acetaminophen (TYLENOL) tablet 650 mg, 650 mg, Oral, Q4H PRN, Elfego Webster MD, 650 mg at 02/19/24 2241    magnesium hydroxide (MILK OF MAGNESIA) 400 MG/5ML suspension 30 mL, 30 mL, Oral, Daily PRN, Elfego Webster MD, 30 mL at 02/19/24 1636    aluminum & magnesium hydroxide-simethicone (MAALOX) 200-200-20 MG/5ML suspension 30 mL, 30 mL, Oral, PRN, Elfego Webster MD    haloperidol (HALDOL) tablet 5 mg, 5 mg, Oral, Q6H PRN **OR** haloperidol lactate (HALDOL) injection 5 mg, 5 mg, IntraMUSCular, Q6H PRN, Elfego Webster MD    benztropine mesylate (COGENTIN) injection 2 mg, 2 mg, IntraMUSCular, BID PRN, Elfego Webster MD    traZODone (DESYREL) tablet 50 mg, 50 mg, Oral, Nightly PRN, Elfego Webster MD, 50 mg at 02/19/24 2241    hydrOXYzine pamoate (VISTARIL) capsule 50 mg, 50 mg, Oral, Q6H PRN, 50 mg at 02/18/24 0810 **OR** hydrOXYzine (VISTARIL) injection 50 mg, 50 mg, IntraMUSCular, Q6H PRN, Elfego Webster MD    celecoxib (CELEBREX) capsule 100 mg, 100 mg, Oral, BID PC, Doreen Montero APRN - CNP, 100 mg at 02/20/24 0940    cyclobenzaprine (FLEXERIL) tablet 5 mg, 5 mg, Oral, TID, Doreen Montero APRN - CNP, 5 mg at 02/20/24 0941    traMADol (ULTRAM) tablet 50 mg, 50 mg, Oral, Daily PRN, Doreen Montero APRN - CNP, 50 mg at 02/20/24 0940    budesonide-formoterol (SYMBICORT) 160-4.5 MCG/ACT inhaler 2 puff, 2 puff, Inhalation, BID RT, Doreen Montero APRN - CNP    albuterol sulfate HFA (PROVENTIL;VENTOLIN;PROAIR) 108 (90 Base) MCG/ACT inhaler 2 puff, 2 puff, Inhalation, Q6H PRN, Doreen Montero APRN - CNP      Examination:  /79   Pulse 84   Temp 98.2 °F (36.8 °C) (Oral)   Resp 18   Ht 1.6 m (5' 3\")   Wt 61.2 kg (135 lb)   LMP 05/08/2014   SpO2 95%   BMI 23.91 kg/m²   Gait - steady  Medication side effects(SE): denies     Mental Status Examination:    Level of consciousness:  within normal limits   Appearance:  good grooming and 
Elfego CEDILLO MD, 650 mg at 02/16/24 1009    magnesium hydroxide (MILK OF MAGNESIA) 400 MG/5ML suspension 30 mL, 30 mL, Oral, Daily PRN, Elfego Webster MD    aluminum & magnesium hydroxide-simethicone (MAALOX) 200-200-20 MG/5ML suspension 30 mL, 30 mL, Oral, PRN, Elfego Webster MD    haloperidol (HALDOL) tablet 5 mg, 5 mg, Oral, Q6H PRN **OR** haloperidol lactate (HALDOL) injection 5 mg, 5 mg, IntraMUSCular, Q6H PRN, Elfego Webster MD    benztropine mesylate (COGENTIN) injection 2 mg, 2 mg, IntraMUSCular, BID PRN, Elfego Webster MD    traZODone (DESYREL) tablet 50 mg, 50 mg, Oral, Nightly PRN, Elfego Webster MD, 50 mg at 02/14/24 2234    hydrOXYzine pamoate (VISTARIL) capsule 50 mg, 50 mg, Oral, Q6H PRN, 50 mg at 02/16/24 1010 **OR** hydrOXYzine (VISTARIL) injection 50 mg, 50 mg, IntraMUSCular, Q6H PRN, Elfego Webster MD    ciprofloxacin (CIPRO) tablet 500 mg, 500 mg, Oral, 2 times per day, Doreen Montero APRN - CNP, 500 mg at 02/16/24 0917    FLUoxetine (PROZAC) capsule 20 mg, 20 mg, Oral, Daily, Castro Lowry APRN - CNP, 20 mg at 02/16/24 0918    celecoxib (CELEBREX) capsule 100 mg, 100 mg, Oral, BID PC, Doreen Montero APRN - CNP, 100 mg at 02/16/24 0917    cyclobenzaprine (FLEXERIL) tablet 5 mg, 5 mg, Oral, TID, Doreen Montero APRN - CNP, 5 mg at 02/16/24 0917    traMADol (ULTRAM) tablet 50 mg, 50 mg, Oral, Daily PRN, Doreen Montero APRN - CNP, 50 mg at 02/15/24 0917    budesonide-formoterol (SYMBICORT) 160-4.5 MCG/ACT inhaler 2 puff, 2 puff, Inhalation, BID RT, Doreen Montero APRN - CNP    albuterol sulfate HFA (PROVENTIL;VENTOLIN;PROAIR) 108 (90 Base) MCG/ACT inhaler 2 puff, 2 puff, Inhalation, Q6H PRN, Doreen Montero APRN - CNP      Examination:  /74   Pulse 87   Temp 97.5 °F (36.4 °C) (Oral)   Resp 18   Ht 1.6 m (5' 3\")   Wt 61.2 kg (135 lb)   LMP 05/08/2014   SpO2 95%   BMI 23.91 kg/m²   Gait - steady  Medication side effects(SE): dry mouth    Mental Status Examination:    Level of 
magnesium hydroxide (MILK OF MAGNESIA) 400 MG/5ML suspension 30 mL, 30 mL, Oral, Daily PRN, Elfego Webster MD    aluminum & magnesium hydroxide-simethicone (MAALOX) 200-200-20 MG/5ML suspension 30 mL, 30 mL, Oral, PRN, Elfego Webster MD    haloperidol (HALDOL) tablet 5 mg, 5 mg, Oral, Q6H PRN **OR** haloperidol lactate (HALDOL) injection 5 mg, 5 mg, IntraMUSCular, Q6H PRN, Elfego Webster MD    benztropine mesylate (COGENTIN) injection 2 mg, 2 mg, IntraMUSCular, BID PRN, Elfego Webster MD    traZODone (DESYREL) tablet 50 mg, 50 mg, Oral, Nightly PRN, Elfego Webster MD, 50 mg at 02/14/24 2234    hydrOXYzine pamoate (VISTARIL) capsule 50 mg, 50 mg, Oral, Q6H PRN, 50 mg at 02/15/24 0924 **OR** hydrOXYzine (VISTARIL) injection 50 mg, 50 mg, IntraMUSCular, Q6H PRN, Elfego Webster MD    ciprofloxacin (CIPRO) tablet 500 mg, 500 mg, Oral, 2 times per day, Doreen Montero APRN - CNP, 500 mg at 02/15/24 0917    FLUoxetine (PROZAC) capsule 20 mg, 20 mg, Oral, Daily, Castro Lowry APRN - CNP, 20 mg at 02/15/24 0917    celecoxib (CELEBREX) capsule 100 mg, 100 mg, Oral, BID PC, Doreen Montero APRN - CNP, 100 mg at 02/15/24 0915    cyclobenzaprine (FLEXERIL) tablet 5 mg, 5 mg, Oral, TID, Doreen Montero APRN - CNP, 5 mg at 02/15/24 0915    traMADol (ULTRAM) tablet 50 mg, 50 mg, Oral, Daily PRN, Doreen Montero APRN - CNP, 50 mg at 02/15/24 0917    budesonide-formoterol (SYMBICORT) 160-4.5 MCG/ACT inhaler 2 puff, 2 puff, Inhalation, BID RT, Doreen Montero APRN - CNP    albuterol sulfate HFA (PROVENTIL;VENTOLIN;PROAIR) 108 (90 Base) MCG/ACT inhaler 2 puff, 2 puff, Inhalation, Q6H PRN, Doreen Montero APRN - CNP      Examination:  /76   Pulse 73   Temp 97.5 °F (36.4 °C) (Oral)   Resp 18   Ht 1.6 m (5' 3\")   Wt 61.2 kg (135 lb)   LMP 05/08/2014   SpO2 96%   BMI 23.91 kg/m²   Gait - steady  Medication side effects(SE): denies     Mental Status Examination:    Level of consciousness:  within normal limits

## 2024-02-21 NOTE — PLAN OF CARE
Problem: Depression  Goal: Will be euthymic at discharge  Description: INTERVENTIONS:  1. Administer medication as ordered  2. Provide emotional support via 1:1 interaction with staff  3. Encourage involvement in milieu/groups/activities  4. Monitor for social isolation  2/14/2024 0855 by Cherise Sterling RN  Outcome: Not Progressing  2/14/2024 0414 by Debbie Hirsch RN  Outcome: Progressing     Problem: Anxiety  Goal: Will report anxiety at manageable levels  Description: INTERVENTIONS:  1. Administer medication as ordered  2. Teach and rehearse alternative coping skills  3. Provide emotional support with 1:1 interaction with staff  2/14/2024 0855 by Cherise Sterling RN  Outcome: Not Progressing  2/14/2024 0414 by Debbie Hirsch RN  Outcome: Progressing     Problem: Sleep Disturbance  Goal: Will exhibit normal sleeping pattern  Description: INTERVENTIONS:  1. Administer medication as ordered  2. Decrease environmental stimuli, including noise, as appropriate  3. Discourage social isolation and naps during the day  2/14/2024 0855 by Cherise Sterling RN  Outcome: Not Progressing  2/14/2024 0414 by Debbie Hirsch RN  Outcome: Progressing   Pt awake in bed,encouraged to come to DR and eat breakfast. Pt denies SI,HI,A/V hallucinations. Contracts for safety on the unit. Rates anxiety and depression 7/10, with 10 being the worst. Pt reports no appetite,and interrupted sleep. Pt reports at times feels like she is going to have a ''panic attack.'' Reports last BM was a few days ago. Reports hasn't seen her PCP in years,and has been out of her prozac. Affect sad and worrisome. Pt reports she washed up at bedside,declined to shower, despite encouragement.   
  Problem: Pain  Goal: Verbalizes/displays adequate comfort level or baseline comfort level  2/18/2024 1209 by Shilpi Gomez RN  Outcome: Progressing  2/18/2024 0044 by Debbie Hirsch RN  Outcome: Progressing     Problem: Depression  Goal: Will be euthymic at discharge  Description: INTERVENTIONS:  1. Administer medication as ordered  2. Provide emotional support via 1:1 interaction with staff  3. Encourage involvement in milieu/groups/activities  4. Monitor for social isolation  2/18/2024 1209 by Shilpi Gomez RN  Outcome: Progressing  2/18/2024 0044 by Debbie Hirsch RN  Outcome: Progressing     Problem: Anxiety  Goal: Will report anxiety at manageable levels  Description: INTERVENTIONS:  1. Administer medication as ordered  2. Teach and rehearse alternative coping skills  3. Provide emotional support with 1:1 interaction with staff  2/18/2024 1209 by Shilpi Gomez RN  Outcome: Progressing  Flowsheets (Taken 2/18/2024 1208)  Will report anxiety at manageable levels:   Provide emotional support with 1:1 interaction with staff   Teach and rehearse alternative coping skills  2/18/2024 0044 by Debbie Hirsch RN  Outcome: Progressing     Problem: Sleep Disturbance  Goal: Will exhibit normal sleeping pattern  Description: INTERVENTIONS:  1. Administer medication as ordered  2. Decrease environmental stimuli, including noise, as appropriate  3. Discourage social isolation and naps during the day  2/18/2024 1209 by Shilpi Gomez RN  Outcome: Progressing  2/18/2024 0044 by Debbie Hirsch RN  Outcome: Progressing     Problem: Nutrition Deficit:  Goal: Optimize nutritional status  2/18/2024 1209 by Shilpi Gomez RN  Outcome: Progressing  2/18/2024 0044 by Debbie Hirsch RN  Outcome: Progressing     Problem: Discharge Planning  Goal: Discharge to home or other facility with appropriate resources  2/18/2024 1209 by Shilpi Gomez RN  Outcome: Progressing  2/18/2024 0044 by Debbie Hirsch RN  Outcome: 
  Problem: Pain  Goal: Verbalizes/displays adequate comfort level or baseline comfort level  2/19/2024 1148 by Shilpi Gomez RN  Outcome: Progressing  2/18/2024 2209 by Debbie Hirsch RN  Outcome: Progressing     Problem: Depression  Goal: Will be euthymic at discharge  Description: INTERVENTIONS:  1. Administer medication as ordered  2. Provide emotional support via 1:1 interaction with staff  3. Encourage involvement in milieu/groups/activities  4. Monitor for social isolation  2/19/2024 1148 by Shilpi Gomez RN  Outcome: Progressing  2/18/2024 2209 by Debbie Hirsch RN  Outcome: Progressing     Problem: Anxiety  Goal: Will report anxiety at manageable levels  Description: INTERVENTIONS:  1. Administer medication as ordered  2. Teach and rehearse alternative coping skills  3. Provide emotional support with 1:1 interaction with staff  2/19/2024 1148 by Shilpi Gomez RN  Outcome: Progressing  Flowsheets (Taken 2/19/2024 1146)  Will report anxiety at manageable levels:   Provide emotional support with 1:1 interaction with staff   Teach and rehearse alternative coping skills  2/18/2024 2209 by Debbie Hirsch RN  Outcome: Progressing     Problem: Sleep Disturbance  Goal: Will exhibit normal sleeping pattern  Description: INTERVENTIONS:  1. Administer medication as ordered  2. Decrease environmental stimuli, including noise, as appropriate  3. Discourage social isolation and naps during the day  2/19/2024 1148 by Shilpi Gomez RN  Outcome: Progressing  2/18/2024 2209 by Debbie Hirsch RN  Outcome: Progressing     Problem: Nutrition Deficit:  Goal: Optimize nutritional status  2/19/2024 1148 by Shilpi Gomez RN  Outcome: Progressing  2/18/2024 2209 by Debbie Hirsch RN  Outcome: Progressing     Problem: Discharge Planning  Goal: Discharge to home or other facility with appropriate resources  2/19/2024 1148 by Shilpi Gomez RN  Outcome: Progressing  2/18/2024 2209 by Debbie Hirsch RN  Outcome: 
  Problem: Pain  Goal: Verbalizes/displays adequate comfort level or baseline comfort level  2/20/2024 2114 by Cherise Sterling RN  Outcome: Progressing  2/20/2024 1234 by Shilpi Gomez RN  Outcome: Progressing     Problem: Depression  Goal: Will be euthymic at discharge  Description: INTERVENTIONS:  1. Administer medication as ordered  2. Provide emotional support via 1:1 interaction with staff  3. Encourage involvement in milieu/groups/activities  4. Monitor for social isolation  2/20/2024 2114 by Cherise Sterling RN  Outcome: Progressing  2/20/2024 1234 by Shilpi Gomez RN  Outcome: Progressing     Problem: Anxiety  Goal: Will report anxiety at manageable levels  Description: INTERVENTIONS:  1. Administer medication as ordered  2. Teach and rehearse alternative coping skills  3. Provide emotional support with 1:1 interaction with staff  2/20/2024 2114 by Cherise Sterling RN  Outcome: Progressing  2/20/2024 1234 by Shilpi Gomez RN  Outcome: Progressing  Flowsheets (Taken 2/20/2024 1224)  Will report anxiety at manageable levels:   Provide emotional support with 1:1 interaction with staff   Teach and rehearse alternative coping skills     Problem: Sleep Disturbance  Goal: Will exhibit normal sleeping pattern  Description: INTERVENTIONS:  1. Administer medication as ordered  2. Decrease environmental stimuli, including noise, as appropriate  3. Discourage social isolation and naps during the day  2/20/2024 2114 by Cherise Sterling RN  Outcome: Progressing  2/20/2024 1234 by Shilpi Gomez RN  Outcome: Progressing     Problem: Nutrition Deficit:  Goal: Optimize nutritional status  2/20/2024 2114 by Cherise Sterling RN  Outcome: Progressing  2/20/2024 1234 by Shilpi Gomez RN  Outcome: Progressing     Problem: Discharge Planning  Goal: Discharge to home or other facility with appropriate resources  2/20/2024 2114 by Cherise Sterling RN  Outcome: Progressing  2/20/2024 1234 by Shilpi Gomez RN  Outcome: 
  Problem: Pain  Goal: Verbalizes/displays adequate comfort level or baseline comfort level  Outcome: Progressing     Problem: Depression  Goal: Will be euthymic at discharge  Description: INTERVENTIONS:  1. Administer medication as ordered  2. Provide emotional support via 1:1 interaction with staff  3. Encourage involvement in milieu/groups/activities  4. Monitor for social isolation  Outcome: Progressing     Problem: Anxiety  Goal: Will report anxiety at manageable levels  Description: INTERVENTIONS:  1. Administer medication as ordered  2. Teach and rehearse alternative coping skills  3. Provide emotional support with 1:1 interaction with staff  Outcome: Progressing  Flowsheets (Taken 2/20/2024 1224)  Will report anxiety at manageable levels:   Provide emotional support with 1:1 interaction with staff   Teach and rehearse alternative coping skills     Problem: Sleep Disturbance  Goal: Will exhibit normal sleeping pattern  Description: INTERVENTIONS:  1. Administer medication as ordered  2. Decrease environmental stimuli, including noise, as appropriate  3. Discourage social isolation and naps during the day  Outcome: Progressing     Problem: Nutrition Deficit:  Goal: Optimize nutritional status  Outcome: Progressing     Problem: Discharge Planning  Goal: Discharge to home or other facility with appropriate resources  Outcome: Progressing     Problem: Safety - Adult  Goal: Free from fall injury  Outcome: Progressing     
Admission complete. Pt has a flat/sad affect. Fair eye contact. Cooperative with her assessment. Currently denies SI/HI and AVH. Denies ever attempting suicide. Pt states in October she started having poor sleep. It continued over the last months and now has affected her everyday life. Pt was not able to state what caused her sleep pattern to change. Pt rates her anxiety 6/10 \"my mind doesn't shut off\". Depression 7/10. Pt has no motivation to get out of bed, no longer enjoys activities she use to like, and has a decreased appetite. Pt reports she has lost 15+ pounds since December. Pt was prescribed Prozac 2 weeks ago \"but it's not enough. I need something else to help\". Pt is laying in bed states she will sign consents in the morning when \"more awake\". Patient rights and hygiene supplies give. Pt reports she is familiar with the unit denies a tour. Will continue to monitor.   Problem: Pain  Goal: Verbalizes/displays adequate comfort level or baseline comfort level  Outcome: Progressing     Problem: Depression  Goal: Will be euthymic at discharge  Description: INTERVENTIONS:  1. Administer medication as ordered  2. Provide emotional support via 1:1 interaction with staff  3. Encourage involvement in milieu/groups/activities  4. Monitor for social isolation  Outcome: Progressing     Problem: Anxiety  Goal: Will report anxiety at manageable levels  Description: INTERVENTIONS:  1. Administer medication as ordered  2. Teach and rehearse alternative coping skills  3. Provide emotional support with 1:1 interaction with staff  Outcome: Progressing     Problem: Sleep Disturbance  Goal: Will exhibit normal sleeping pattern  Description: INTERVENTIONS:  1. Administer medication as ordered  2. Decrease environmental stimuli, including noise, as appropriate  3. Discourage social isolation and naps during the day  Outcome: Progressing     
Found patient resting on bed. Forlorn look on her face. \" I thoughts I was starting to feel better yesterday but today I feel so much worse\" pt states that this is both a physical lack of energy and feelings of depression. \" I don't no where it it coming from\". She states both anxiety and derpession are a at and 8 or 9 on a one to ten scale.  Problem: Pain  Goal: Verbalizes/displays adequate comfort level or baseline comfort level  2/15/2024 1040 by Anayeli Lema RN  Outcome: Progressing  2/14/2024 2145 by Teresa Arteaga RN  Outcome: Progressing  Flowsheets (Taken 2/14/2024 2145)  Verbalizes/displays adequate comfort level or baseline comfort level:   Encourage patient to monitor pain and request assistance   Assess pain using appropriate pain scale     Problem: Depression  Goal: Will be euthymic at discharge  Description: INTERVENTIONS:  1. Administer medication as ordered  2. Provide emotional support via 1:1 interaction with staff  3. Encourage involvement in milieu/groups/activities  4. Monitor for social isolation  2/15/2024 1040 by Anayeli Lema RN  Outcome: Progressing  2/14/2024 2145 by Teresa Arteaga RN  Outcome: Progressing     Problem: Anxiety  Goal: Will report anxiety at manageable levels  Description: INTERVENTIONS:  1. Administer medication as ordered  2. Teach and rehearse alternative coping skills  3. Provide emotional support with 1:1 interaction with staff  2/15/2024 1040 by Anayeli Lema RN  Outcome: Progressing  2/14/2024 2145 by Teresa Arteaga RN  Outcome: Progressing  Flowsheets (Taken 2/14/2024 2145)  Will report anxiety at manageable levels:   Administer medication as ordered   Provide emotional support with 1:1 interaction with staff     Problem: Nutrition Deficit:  Goal: Optimize nutritional status  2/15/2024 1040 by Anayeli Lema RN  Outcome: Progressing  2/15/2024 0946 by Ramonita Beckett, [place], LD  Flowsheets (Taken 2/15/2024 0946)  Nutrient intake appropriate for 
Nutrition Problem #1: Inadequate oral intake  Intervention: Food and/or Nutrient Delivery: Continue Current Diet, Start Oral Nutrition Supplement  Nutritional      
Patient is in her room reading. Flat affect. Good eye contact. Reports having a good day. Rates her anxiety 4/10. Depression 3/10. Denies SI/HI and AVH. Denies further needs at this time.   Problem: Pain  Goal: Verbalizes/displays adequate comfort level or baseline comfort level  2/19/2024 1952 by Debbie Hirsch RN  Outcome: Progressing  2/19/2024 1148 by Shilpi Gomez RN  Outcome: Progressing     Problem: Depression  Goal: Will be euthymic at discharge  Description: INTERVENTIONS:  1. Administer medication as ordered  2. Provide emotional support via 1:1 interaction with staff  3. Encourage involvement in milieu/groups/activities  4. Monitor for social isolation  2/19/2024 1952 by Debbie Hirsch RN  Outcome: Progressing  2/19/2024 1148 by Shilpi Gomez RN  Outcome: Progressing     Problem: Anxiety  Goal: Will report anxiety at manageable levels  Description: INTERVENTIONS:  1. Administer medication as ordered  2. Teach and rehearse alternative coping skills  3. Provide emotional support with 1:1 interaction with staff  2/19/2024 1952 by Debbie Hirsch RN  Outcome: Progressing  2/19/2024 1148 by Shilpi Gomez RN  Outcome: Progressing  Flowsheets (Taken 2/19/2024 1146)  Will report anxiety at manageable levels:   Provide emotional support with 1:1 interaction with staff   Teach and rehearse alternative coping skills     Problem: Sleep Disturbance  Goal: Will exhibit normal sleeping pattern  Description: INTERVENTIONS:  1. Administer medication as ordered  2. Decrease environmental stimuli, including noise, as appropriate  3. Discourage social isolation and naps during the day  2/19/2024 1952 by Debbie Hirsch RN  Outcome: Progressing  2/19/2024 1148 by Shilpi Gomez RN  Outcome: Progressing     Problem: Nutrition Deficit:  Goal: Optimize nutritional status  2/19/2024 1952 by Debbie Hirsch RN  Outcome: Progressing  2/19/2024 1148 by Shilpi Gomez RN  Outcome: Progressing     Problem: Discharge 
Patient is out watching TV. Flat affect. Cooperative with her assessment. Rates both her anxiety and depression 4/10. Denies SI/HI and AVH. Reports good sleep and appetite. Pt is attending groups. Denies further needs at this time.   Problem: Pain  Goal: Verbalizes/displays adequate comfort level or baseline comfort level  2/18/2024 2209 by Debbie Hirsch RN  Outcome: Progressing  2/18/2024 1209 by Shilpi Gomez RN  Outcome: Progressing     Problem: Depression  Goal: Will be euthymic at discharge  Description: INTERVENTIONS:  1. Administer medication as ordered  2. Provide emotional support via 1:1 interaction with staff  3. Encourage involvement in milieu/groups/activities  4. Monitor for social isolation  2/18/2024 2209 by Debbie Hirsch RN  Outcome: Progressing  2/18/2024 1209 by Shilpi Gomez RN  Outcome: Progressing     Problem: Anxiety  Goal: Will report anxiety at manageable levels  Description: INTERVENTIONS:  1. Administer medication as ordered  2. Teach and rehearse alternative coping skills  3. Provide emotional support with 1:1 interaction with staff  2/18/2024 2209 by Debbie Hirsch RN  Outcome: Progressing  2/18/2024 1209 by Shilpi oGmez RN  Outcome: Progressing  Flowsheets (Taken 2/18/2024 1208)  Will report anxiety at manageable levels:   Provide emotional support with 1:1 interaction with staff   Teach and rehearse alternative coping skills     Problem: Sleep Disturbance  Goal: Will exhibit normal sleeping pattern  Description: INTERVENTIONS:  1. Administer medication as ordered  2. Decrease environmental stimuli, including noise, as appropriate  3. Discourage social isolation and naps during the day  2/18/2024 2209 by Debbie Hirsch RN  Outcome: Progressing  2/18/2024 1209 by Shilpi Gomez RN  Outcome: Progressing     Problem: Nutrition Deficit:  Goal: Optimize nutritional status  2/18/2024 2209 by Debbie Hirsch RN  Outcome: Progressing  2/18/2024 1209 by Shilpi Gomez RN  Outcome: 
Pt visible on unit. Seen socializing with select peers. Good eye contact. Sarcastic irritable tone at times. Guarded at times. Admits to \"really high anxiety\" with \"low\" depression. Denies any SI, HI or AVH at this. Reports ongoing poor sleep. Hopeful for better sleep tonight. Resting in bed at this time.     Problem: Pain  Goal: Verbalizes/displays adequate comfort level or baseline comfort level  2/14/2024 2145 by Teresa Arteaga RN  Outcome: Progressing  Flowsheets (Taken 2/14/2024 2145)  Verbalizes/displays adequate comfort level or baseline comfort level:   Encourage patient to monitor pain and request assistance   Assess pain using appropriate pain scale  2/14/2024 0855 by Cherise Sterling RN  Outcome: Progressing     Problem: Depression  Goal: Will be euthymic at discharge  Description: INTERVENTIONS:  1. Administer medication as ordered  2. Provide emotional support via 1:1 interaction with staff  3. Encourage involvement in milieu/groups/activities  4. Monitor for social isolation  2/14/2024 2145 by Teresa Arteaga RN  Outcome: Progressing  2/14/2024 0855 by Cherise Sterling RN  Outcome: Not Progressing     Problem: Anxiety  Goal: Will report anxiety at manageable levels  Description: INTERVENTIONS:  1. Administer medication as ordered  2. Teach and rehearse alternative coping skills  3. Provide emotional support with 1:1 interaction with staff  2/14/2024 2145 by Teresa Arteaga RN  Outcome: Progressing  Flowsheets (Taken 2/14/2024 2145)  Will report anxiety at manageable levels:   Administer medication as ordered   Provide emotional support with 1:1 interaction with staff  2/14/2024 0855 by Cherise Sterling RN  Outcome: Not Progressing     Problem: Sleep Disturbance  Goal: Will exhibit normal sleeping pattern  Description: INTERVENTIONS:  1. Administer medication as ordered  2. Decrease environmental stimuli, including noise, as appropriate  3. Discourage social isolation and naps 
Pt visible on unit. Tends to keep to self. Flat/sad affect with fair eye contact. Admits to feeling hopeless and helpless about situation. Reports improving sleep and a good appetite. Denies any SI, HI or AVH. Does admits to both depression and anxiety. Cooperative with staff and peers.     Problem: Pain  Goal: Verbalizes/displays adequate comfort level or baseline comfort level  2/16/2024 1343 by Teresa Arteaga RN  Outcome: Progressing  Flowsheets (Taken 2/14/2024 2145)  Verbalizes/displays adequate comfort level or baseline comfort level:   Encourage patient to monitor pain and request assistance   Assess pain using appropriate pain scale  2/16/2024 1342 by Teresa Arteaga RN  Outcome: Progressing  Flowsheets (Taken 2/14/2024 2145)  Verbalizes/displays adequate comfort level or baseline comfort level:   Encourage patient to monitor pain and request assistance   Assess pain using appropriate pain scale     Problem: Depression  Goal: Will be euthymic at discharge  Description: INTERVENTIONS:  1. Administer medication as ordered  2. Provide emotional support via 1:1 interaction with staff  3. Encourage involvement in milieu/groups/activities  4. Monitor for social isolation  2/16/2024 1343 by Teresa Arteaga RN  Outcome: Progressing  2/16/2024 1342 by Teresa Arteaga RN  Outcome: Progressing     Problem: Anxiety  Goal: Will report anxiety at manageable levels  Description: INTERVENTIONS:  1. Administer medication as ordered  2. Teach and rehearse alternative coping skills  3. Provide emotional support with 1:1 interaction with staff  2/16/2024 1343 by Teresa Arteaga RN  Outcome: Progressing  Flowsheets (Taken 2/16/2024 1336)  Will report anxiety at manageable levels: Administer medication as ordered  2/16/2024 1342 by Teresa Arteaga RN  Outcome: Progressing  Flowsheets (Taken 2/16/2024 1336)  Will report anxiety at manageable levels: Administer medication as ordered     Problem: 
Visible on the unit, social with select peers. Walks with strong and steady gait. Denies SI, HI, AVH. Reports feeling better and improved. Rates anxiety and depression 6/10, states this is an improvement. Reports chronic left arm pain r/t lymph node removal 5 years ago. Remains helpless, depressed, anxious, withdrawn, cooperative. Reports good sleep, decreased appetite. Encouraged to perform daily hygiene care and to attend groups.         Problem: Pain  Goal: Verbalizes/displays adequate comfort level or baseline comfort level  Outcome: Progressing     Problem: Depression  Goal: Will be euthymic at discharge  Description: INTERVENTIONS:  1. Administer medication as ordered  2. Provide emotional support via 1:1 interaction with staff  3. Encourage involvement in milieu/groups/activities  4. Monitor for social isolation  Outcome: Progressing     Problem: Anxiety  Goal: Will report anxiety at manageable levels  Description: INTERVENTIONS:  1. Administer medication as ordered  2. Teach and rehearse alternative coping skills  3. Provide emotional support with 1:1 interaction with staff  Outcome: Progressing  Flowsheets (Taken 2/17/2024 0667)  Will report anxiety at manageable levels:   Provide emotional support with 1:1 interaction with staff   Teach and rehearse alternative coping skills     Problem: Sleep Disturbance  Goal: Will exhibit normal sleeping pattern  Description: INTERVENTIONS:  1. Administer medication as ordered  2. Decrease environmental stimuli, including noise, as appropriate  3. Discourage social isolation and naps during the day  Outcome: Progressing     Problem: Nutrition Deficit:  Goal: Optimize nutritional status  Outcome: Progressing     Problem: Discharge Planning  Goal: Discharge to home or other facility with appropriate resources  Outcome: Progressing     
Administer medication as ordered  2/16/2024 1342 by Teresa Arteaga RN  Outcome: Progressing  Flowsheets (Taken 2/16/2024 1336)  Will report anxiety at manageable levels: Administer medication as ordered     Problem: Sleep Disturbance  Goal: Will exhibit normal sleeping pattern  Description: INTERVENTIONS:  1. Administer medication as ordered  2. Decrease environmental stimuli, including noise, as appropriate  3. Discourage social isolation and naps during the day  2/16/2024 2011 by Keyshawn Funez RN  Outcome: Progressing  2/16/2024 1343 by Teresa Arteaga RN  Outcome: Progressing     Problem: Nutrition Deficit:  Goal: Optimize nutritional status  2/16/2024 2011 by Keyshawn Funez RN  Outcome: Progressing  2/16/2024 1343 by Teresa Arteaga RN  Outcome: Progressing  Flowsheets (Taken 2/15/2024 0946 by Ramonita Beckett, RD, LD)  Nutrient intake appropriate for improving, restoring, or maintaining nutritional needs:   Assess nutritional status and recommend course of action   Monitor oral intake, labs, and treatment plans   Recommend appropriate diets, oral nutritional supplements, and vitamin/mineral supplements     
Optimize nutritional status  2/15/2024 2146 by Debbie Hirsch, RN  Outcome: Progressing  2/15/2024 1040 by Anayeli Lema RN  Outcome: Progressing  2/15/2024 0946 by Ramonita Beckett, RD, LD  Flowsheets (Taken 2/15/2024 0946)  Nutrient intake appropriate for improving, restoring, or maintaining nutritional needs:   Assess nutritional status and recommend course of action   Monitor oral intake, labs, and treatment plans   Recommend appropriate diets, oral nutritional supplements, and vitamin/mineral supplements     
Progressing

## 2024-02-21 NOTE — DISCHARGE SUMMARY
discussed.    Encourage patient to attend outpatient follow up appointment and therapy.    Patient was advised to call the outpatient provider, visit the nearest ED or call 911 if symptoms are not manageable.     Patient's family member was contacted prior to the discharge.         Medication List        START taking these medications      gabapentin 300 MG capsule  Commonly known as: NEURONTIN  Take 1 capsule by mouth 3 times daily for 30 days.     hydrOXYzine pamoate 50 MG capsule  Commonly known as: VISTARIL  Take 1 capsule by mouth every 8 hours as needed for Anxiety     traZODone 50 MG tablet  Commonly known as: DESYREL  Take 1 tablet by mouth nightly            CHANGE how you take these medications      FLUoxetine 10 MG capsule  Commonly known as: PROZAC  Take 3 capsules by mouth daily  What changed:   medication strength  how much to take            CONTINUE taking these medications      celecoxib 100 MG capsule  Commonly known as: CELEBREX     cyclobenzaprine 5 MG tablet  Commonly known as: FLEXERIL     traMADol 50 MG tablet  Commonly known as: ULTRAM            STOP taking these medications      albuterol (2.5 MG/3ML) 0.083% nebulizer solution  Commonly known as: PROVENTIL     albuterol sulfate  (90 Base) MCG/ACT inhaler  Commonly known as: ProAir HFA     azelastine 0.1 % nasal spray  Commonly known as: ASTELIN     lidocaine 5 %  Commonly known as: LIDODERM     MULTIVITAMIN PO     ondansetron 4 MG disintegrating tablet  Commonly known as: Zofran ODT     pregabalin 50 MG capsule  Commonly known as: LYRICA     Symbicort 80-4.5 MCG/ACT Aero  Generic drug: budesonide-formoterol     tiZANidine 4 MG tablet  Commonly known as: ZANAFLEX               Where to Get Your Medications        These medications were sent to [facility] - [place], [place] - [address] - P [phone] - F [phone]  [address] [place] [address] [place] [address]      Phone: [phone]   FLUoxetine 10 MG capsule  gabapentin

## 2024-02-22 ENCOUNTER — APPOINTMENT (OUTPATIENT)
Dept: PRIMARY CARE | Facility: CLINIC | Age: 59
End: 2024-02-22
Payer: COMMERCIAL

## 2024-03-04 ENCOUNTER — OFFICE VISIT (OUTPATIENT)
Dept: FAMILY MEDICINE CLINIC | Age: 59
End: 2024-03-04
Payer: COMMERCIAL

## 2024-03-04 VITALS
SYSTOLIC BLOOD PRESSURE: 120 MMHG | WEIGHT: 142 LBS | BODY MASS INDEX: 25.16 KG/M2 | OXYGEN SATURATION: 95 % | HEART RATE: 95 BPM | DIASTOLIC BLOOD PRESSURE: 82 MMHG | TEMPERATURE: 97.6 F | HEIGHT: 63 IN

## 2024-03-04 DIAGNOSIS — F41.9 ANXIETY: ICD-10-CM

## 2024-03-04 DIAGNOSIS — F33.41 MAJOR DEPRESSIVE DISORDER, RECURRENT EPISODE, IN PARTIAL REMISSION (HCC): Primary | ICD-10-CM

## 2024-03-04 PROCEDURE — G8427 DOCREV CUR MEDS BY ELIG CLIN: HCPCS | Performed by: FAMILY MEDICINE

## 2024-03-04 PROCEDURE — 1111F DSCHRG MED/CURRENT MED MERGE: CPT | Performed by: FAMILY MEDICINE

## 2024-03-04 PROCEDURE — 4004F PT TOBACCO SCREEN RCVD TLK: CPT | Performed by: FAMILY MEDICINE

## 2024-03-04 PROCEDURE — 3017F COLORECTAL CA SCREEN DOC REV: CPT | Performed by: FAMILY MEDICINE

## 2024-03-04 PROCEDURE — 99203 OFFICE O/P NEW LOW 30 MIN: CPT | Performed by: FAMILY MEDICINE

## 2024-03-04 PROCEDURE — G8484 FLU IMMUNIZE NO ADMIN: HCPCS | Performed by: FAMILY MEDICINE

## 2024-03-04 PROCEDURE — G8419 CALC BMI OUT NRM PARAM NOF/U: HCPCS | Performed by: FAMILY MEDICINE

## 2024-03-04 SDOH — ECONOMIC STABILITY: FOOD INSECURITY: WITHIN THE PAST 12 MONTHS, YOU WORRIED THAT YOUR FOOD WOULD RUN OUT BEFORE YOU GOT MONEY TO BUY MORE.: NEVER TRUE

## 2024-03-04 SDOH — ECONOMIC STABILITY: FOOD INSECURITY: WITHIN THE PAST 12 MONTHS, THE FOOD YOU BOUGHT JUST DIDN'T LAST AND YOU DIDN'T HAVE MONEY TO GET MORE.: NEVER TRUE

## 2024-03-04 SDOH — ECONOMIC STABILITY: HOUSING INSECURITY
IN THE LAST 12 MONTHS, WAS THERE A TIME WHEN YOU DID NOT HAVE A STEADY PLACE TO SLEEP OR SLEPT IN A SHELTER (INCLUDING NOW)?: NO

## 2024-03-04 SDOH — ECONOMIC STABILITY: INCOME INSECURITY: HOW HARD IS IT FOR YOU TO PAY FOR THE VERY BASICS LIKE FOOD, HOUSING, MEDICAL CARE, AND HEATING?: SOMEWHAT HARD

## 2024-03-04 ASSESSMENT — ENCOUNTER SYMPTOMS: COUGH: 0

## 2024-03-04 NOTE — PROGRESS NOTES
Subjective:      Patient ID: Elida Whitehead is a 58 y.o. female    Mental Health Problem  This is a chronic problem.   The onset of the illness is precipitated by emotional stress.     Here to establish.  Just out of hospital for severe depression and anxiety.  Here with family.   Still feeling anxious and depressed.  Did not have good experience in hospital   Does have appt tomorrow with NP psychiatry.  Not getting much benefit from using vistaril for anxiety.  Does sleep better with trazadone.  Tolerating meds -no rashes.         Review of Systems   Constitutional:  Negative for chills and fever.   Respiratory:  Negative for cough.    Skin:  Negative for rash.   Neurological:  Negative for weakness.   Psychiatric/Behavioral:  Negative for suicidal ideas.      Reviewed allergy, medical, social, surgical, family and med list changes and updated   Files     Social History     Socioeconomic History    Marital status: Single    Number of children: 2    Years of education: 11   Occupational History    Occupation: , now on [employer]   Tobacco Use    Smoking status: Former     Current packs/day: 0.00     Average packs/day: 1 pack/day for 20.0 years (20.0 ttl pk-yrs)     Types: Cigarettes     Start date: 1997     Quit date: 2017     Years since quittin.3    Smokeless tobacco: Never   Vaping Use    Vaping Use: Never used   Substance and Sexual Activity    Alcohol use: Yes     Alcohol/week: 0.0 standard drinks of alcohol     Comment: Hasn't had any alcohol since 2018    Drug use: No    Sexual activity: Not Currently   Social History Narrative    Born in [place], one of 3 siblings, 2 half sisters (does not talk to them since )    Single, , 2 sons, in [facility]    Grandchildren 5, does some babysitting    Lives in a mobil home in [place] alone    Worked as a  x 15 years    [employer], att[employer]     On Medicare for depression x      Social Determinants

## 2024-03-05 ENCOUNTER — OFFICE VISIT (OUTPATIENT)
Dept: BEHAVIORAL/MENTAL HEALTH CLINIC | Age: 59
End: 2024-03-05
Payer: MEDICAID

## 2024-03-05 VITALS
BODY MASS INDEX: 24.59 KG/M2 | HEART RATE: 105 BPM | DIASTOLIC BLOOD PRESSURE: 84 MMHG | HEIGHT: 64 IN | SYSTOLIC BLOOD PRESSURE: 132 MMHG | WEIGHT: 144 LBS

## 2024-03-05 DIAGNOSIS — F32.9 CURRENT EPISODE OF MAJOR DEPRESSIVE DISORDER WITHOUT PRIOR EPISODE, UNSPECIFIED DEPRESSION EPISODE SEVERITY: Primary | ICD-10-CM

## 2024-03-05 DIAGNOSIS — F33.41 MAJOR DEPRESSIVE DISORDER, RECURRENT EPISODE, IN PARTIAL REMISSION (HCC): ICD-10-CM

## 2024-03-05 PROCEDURE — 99204 OFFICE O/P NEW MOD 45 MIN: CPT | Performed by: REGISTERED NURSE

## 2024-03-05 PROCEDURE — 90833 PSYTX W PT W E/M 30 MIN: CPT | Performed by: REGISTERED NURSE

## 2024-03-05 RX ORDER — FLUOXETINE HYDROCHLORIDE 40 MG/1
40 CAPSULE ORAL DAILY
Qty: 30 CAPSULE | Refills: 3 | Status: SHIPPED | OUTPATIENT
Start: 2024-03-05

## 2024-03-05 RX ORDER — PROPRANOLOL HYDROCHLORIDE 10 MG/1
10 TABLET ORAL 3 TIMES DAILY PRN
Qty: 90 TABLET | Refills: 3 | Status: SHIPPED | OUTPATIENT
Start: 2024-03-05

## 2024-03-05 NOTE — PATIENT INSTRUCTIONS
assistance programs for medication    [employer] $4 Prescription Program:  What they offer: Prescription Program includes up to a 30-day supply for $4 and a 90-day supply for $10 of some covered generic drugs at commonly prescribed dosages  Website: https://www.[employer]/cp/4-prescriptions/4767778    [facility] Repository:  Phone Number: [phone]

## 2024-03-05 NOTE — PROGRESS NOTES
INITIAL PSYCHIATRIC EVALUATION (OUTPATIENT)      Date of Service: 3/5/2024  Purpose:  Psychiatric Evaluation with Medication Assessment with psychotherapy  Referral Source: Dr. Manriquez   History  From: patient  Record Review: moderate    Chief Complaint  New Patient ([facility])        History of Present Illness    Elida Whitehead is a 58 y.o. female with a history significant for MDD and YO that has worsened over the past few months. Previously states she abruptly stopped her medications in the middle of winter, leading her to feel uneasy around her family and paranoid in her house. She additionally had decreased interest in activities she was usually interested in. She was admitted to [facility] from 2/14-2/20, where her medication regiment was changed to Prozac, vistaril, trazodone. Medication compliance since discharged from [facility].     States her anxiety is still bad, and she is continuing to get nervous often. States she gets shaky, nervous, worried, and feels unsettled. States she always feels like she needs to get things done and then gets overloaded.     Anxiety 10/10.     Excessive anxiety and worry, occurring most days about unidentifiable triggers  Find it difficult to control the worry. Associated symptoms: Restless, tiredness, poor concentration, irritability, muscle tension    States she will feel good for a little bit, but then she drops down and will feel low for about a few hours in a day.     Activities she enjoys include working in the yard and being outdoors. States she feels better in the Spring than the winter.     Stressors: Family conflict, daily life activities, feels like she gets up and then gets knocked back down     States things are improving with her son. She has gone to counseling one time with him and the relationship is improving.     Identified emotional trauma as a child and adult.     States her appetite is okay. Not skipping meals.     Sleeping 6-7 hours, uses the

## 2024-03-06 NOTE — TELEPHONE ENCOUNTER
MEDICATION REFILL REQUEST     Rx Requested    Requested Prescriptions     Pending Prescriptions Disp Refills    traZODone (DESYREL) 50 MG tablet 15 tablet 2     Sig: Take 1 tablet by mouth nightly         Patient's Last Office Visit   3/5/2024      Patient's Next Office Visit   Future Appointments   Date Time Provider Department Center   4/12/2024  2:30 PM Castro Lowry, MAURICIO - CNP [facility]LOX AM  [facility]         Other comments

## 2024-03-08 RX ORDER — TRAZODONE HYDROCHLORIDE 50 MG/1
50 TABLET ORAL NIGHTLY
Qty: 15 TABLET | Refills: 2 | OUTPATIENT
Start: 2024-03-08

## 2024-03-09 DIAGNOSIS — F33.41 MAJOR DEPRESSIVE DISORDER, RECURRENT EPISODE, IN PARTIAL REMISSION (HCC): ICD-10-CM

## 2024-03-09 RX ORDER — GABAPENTIN 300 MG/1
300 CAPSULE ORAL 3 TIMES DAILY
Qty: 45 CAPSULE | Refills: 1 | OUTPATIENT
Start: 2024-03-09

## 2024-03-29 ENCOUNTER — OFFICE VISIT (OUTPATIENT)
Dept: FAMILY MEDICINE CLINIC | Age: 59
End: 2024-03-29
Payer: COMMERCIAL

## 2024-03-29 VITALS
DIASTOLIC BLOOD PRESSURE: 86 MMHG | HEIGHT: 64 IN | BODY MASS INDEX: 24.62 KG/M2 | SYSTOLIC BLOOD PRESSURE: 122 MMHG | WEIGHT: 144.2 LBS | OXYGEN SATURATION: 97 % | TEMPERATURE: 97.2 F | HEART RATE: 97 BPM

## 2024-03-29 DIAGNOSIS — G89.4 CHRONIC PAIN SYNDROME: ICD-10-CM

## 2024-03-29 DIAGNOSIS — F41.9 ANXIETY: ICD-10-CM

## 2024-03-29 DIAGNOSIS — Z85.3 HISTORY OF BREAST CANCER: ICD-10-CM

## 2024-03-29 DIAGNOSIS — F33.41 MAJOR DEPRESSIVE DISORDER, RECURRENT EPISODE, IN PARTIAL REMISSION (HCC): Primary | ICD-10-CM

## 2024-03-29 PROCEDURE — 99213 OFFICE O/P EST LOW 20 MIN: CPT | Performed by: FAMILY MEDICINE

## 2024-03-29 RX ORDER — GABAPENTIN 300 MG/1
300 CAPSULE ORAL 2 TIMES DAILY
Qty: 60 CAPSULE | Refills: 0 | Status: SHIPPED | OUTPATIENT
Start: 2024-03-29 | End: 2024-04-28

## 2024-03-29 RX ORDER — GABAPENTIN 300 MG/1
300 CAPSULE ORAL 3 TIMES DAILY
Qty: 45 CAPSULE | Refills: 1 | Status: CANCELLED | OUTPATIENT
Start: 2024-03-29 | End: 2024-04-28

## 2024-03-29 NOTE — PROGRESS NOTES
Subjective:      Patient ID: Elida Whitehead is a 58 y.o. female.    HPI Treatment Adherence:   Medication compliance:  {Desc; compliance:5303::\"compliant most of the time\"}  Diet compliance:  {Desc; compliance:5303::\"compliant most of the time\"}  Weight trend: {INCREASING/DECREASING/STABLE:87796}  Current exercise: {EXERCISE TYPE:070997654}  Barriers: {Barriers to success:37745}    Hypertension:  Home blood pressure monitoring: {NO/YES:4701616194}.  She {is/is not:9024} adherent to a low sodium diet. Patient {denies/complains:73740} {Symptoms of Hypertension, Denies:81345}.  Antihypertensive medication side effects: {Hypertension med side effects:5728::\"no medication side effects noted\"}.  Use of agents associated with hypertension: {bp agents assoc with hypertension:511::\"none\"}.                                        Sodium (mEq/L)   Date Value   02/13/2024 143    BUN (mg/dL)   Date Value   02/13/2024 9    Glucose (mg/dL)   Date Value   02/13/2024 123 (H)      Potassium (mEq/L)   Date Value   02/13/2024 3.8    Creatinine (mg/dL)   Date Value   02/13/2024 0.49 (L)         Hyperlipidemia:  No new myalgias or GI upset on {RP HYPERLIPIDEMIA MEDS:46654}.     Lab Results   Component Value Date    CHOL 171 02/13/2024    TRIG 98 02/13/2024    HDL 47 02/13/2024    LDLCALC 104 02/13/2024     Lab Results   Component Value Date    ALT 15 02/13/2024    AST 19 02/13/2024            Review of Systems    Objective:   Physical Exam    Assessment / Plan:

## 2024-03-29 NOTE — PROGRESS NOTES
Subjective:      Patient ID: Elida Whitehead is a 58 y.o. female    HPI  Here in follow up of sorts.  Seeing psychiatry currently-just had appt.  New medication started.  Patient does not understand why she needs to see psychiatrist-just admitted to psychiatric floor last month for severe depression--still struggling with anxiety and depression.   Has chronic pain secondary to breast surgery about 6 years ago -not seen any specialists for this recently.   Pain mostly along side of chest and arm.   Requesting refill on neurontin -although thinks may be affecting apptetite   Review of Systems   Constitutional:  Positive for appetite change.   Gastrointestinal:  Negative for nausea and vomiting.   Skin:  Negative for rash.   Psychiatric/Behavioral:  The patient is nervous/anxious.    Reviewed allergy, medical, social, surgical, family and med list changes and updated   Files     Social History     Socioeconomic History    Marital status: Single     Spouse name: None    Number of children: 2    Years of education: 11    Highest education level: None   Occupational History    Occupation: , now on [employer]   Tobacco Use    Smoking status: Former     Current packs/day: 0.00     Average packs/day: 1 pack/day for 20.0 years (20.0 ttl pk-yrs)     Types: Cigarettes     Start date: 1997     Quit date: 2017     Years since quittin.3    Smokeless tobacco: Never   Vaping Use    Vaping Use: Never used   Substance and Sexual Activity    Alcohol use: Yes     Alcohol/week: 0.0 standard drinks of alcohol     Comment: Hasn't had any alcohol since 2018    Drug use: No    Sexual activity: Not Currently   Social History Narrative    Born in [place], one of 3 siblings, 2 half sisters (does not talk to them since )    Single, , 2 sons, in [place] and [place]    Grandchildren 5, does some babysitting    Lives in a mobil home in [place] alone    Worked as a  x 15 years    [employer],

## 2024-04-01 ENCOUNTER — TELEPHONE (OUTPATIENT)
Dept: FAMILY MEDICINE CLINIC | Age: 59
End: 2024-04-01

## 2024-04-01 NOTE — TELEPHONE ENCOUNTER
Called Patient to inform her The external Psychiatry referral Dr. Emigdio horowitz also has office in [place]. Gave her phone number to schedule appointment.   Masoud Jaimes   [address] [place] [id number] [phone]

## 2024-04-15 ENCOUNTER — APPOINTMENT (OUTPATIENT)
Dept: ULTRASOUND IMAGING | Age: 59
End: 2024-04-15
Payer: COMMERCIAL

## 2024-04-15 ENCOUNTER — HOSPITAL ENCOUNTER (OUTPATIENT)
Dept: WOMENS IMAGING | Age: 59
Discharge: HOME | End: 2024-04-17
Payer: COMMERCIAL

## 2024-04-15 DIAGNOSIS — Z85.3 HISTORY OF BREAST CANCER: ICD-10-CM

## 2024-04-15 PROCEDURE — G0279 TOMOSYNTHESIS, MAMMO: HCPCS

## 2024-04-26 RX ORDER — TRAZODONE HYDROCHLORIDE 50 MG/1
50 TABLET ORAL NIGHTLY
Qty: 15 TABLET | Refills: 2 | OUTPATIENT
Start: 2024-04-26

## 2024-04-30 ENCOUNTER — OFFICE VISIT (OUTPATIENT)
Dept: FAMILY MEDICINE CLINIC | Age: 59
End: 2024-04-30
Payer: COMMERCIAL

## 2024-04-30 VITALS
HEIGHT: 64 IN | DIASTOLIC BLOOD PRESSURE: 78 MMHG | TEMPERATURE: 97.6 F | BODY MASS INDEX: 23.56 KG/M2 | SYSTOLIC BLOOD PRESSURE: 120 MMHG | HEART RATE: 88 BPM | WEIGHT: 138 LBS | OXYGEN SATURATION: 97 %

## 2024-04-30 DIAGNOSIS — F41.9 ANXIETY: ICD-10-CM

## 2024-04-30 DIAGNOSIS — Z85.3 HISTORY OF BREAST CANCER: ICD-10-CM

## 2024-04-30 DIAGNOSIS — G89.4 CHRONIC PAIN SYNDROME: ICD-10-CM

## 2024-04-30 DIAGNOSIS — F33.41 MAJOR DEPRESSIVE DISORDER, RECURRENT EPISODE, IN PARTIAL REMISSION (HCC): Primary | ICD-10-CM

## 2024-04-30 PROCEDURE — 99213 OFFICE O/P EST LOW 20 MIN: CPT | Performed by: FAMILY MEDICINE

## 2024-04-30 RX ORDER — FLUOXETINE HYDROCHLORIDE 20 MG/1
20 CAPSULE ORAL DAILY
Qty: 30 CAPSULE | Refills: 0 | Status: SHIPPED | OUTPATIENT
Start: 2024-04-30

## 2024-04-30 NOTE — PROGRESS NOTES
Subjective:      Patient ID: Elida Whitehead is a 58 y.o. female    Depression  Visit Type: follow-up  Patient presents with the following symptoms: weight loss.      Anxiety  Presents for follow-up visit. The severity of symptoms is moderate.     Controlled substances monitoring: [place]ARRS report reviewed today- activity consistent with treatment plan.   Here in follow up for anxiety and depression and chronic pain syndrome.  Stopped  taking neurontin few weeks ago secondary to leg swelling.  Did not seem to be producing much decrease in pain.  Still had not made contact with psychiatry.  Had dental work recently -tapered herself down to prozac 20 mg daily as she feels like higher doses were not being tolerated well.  Weight down 6 pounds since last time.  Not taking inderal per her request.  Not taking trazadone per her request.   Requesting klonopin for anxiety -has take in past.  Has appt with pain management next month but wants new referral as she was taken off tramadol from last time.  .     Review of Systems   Constitutional:  Positive for weight loss. Negative for unexpected weight change.   Psychiatric/Behavioral:  Positive for depression and sleep disturbance.      Reviewed allergy, medical, social, surgical, family and med list changes and updated   Files--reviewed mamm which was neg      Social History     Socioeconomic History   • Marital status: Single   • Number of children: 2   • Years of education: 11   Occupational History   • Occupation: , now on [employer]   Tobacco Use   • Smoking status: Former     Current packs/day: 0.00     Average packs/day: 1 pack/day for 20.0 years (20.0 ttl pk-yrs)     Types: Cigarettes     Start date: 1997     Quit date: 2017     Years since quittin.4   • Smokeless tobacco: Never   Vaping Use   • Vaping Use: Never used   Substance and Sexual Activity   • Alcohol use: Yes     Alcohol/week: 0.0 standard drinks of alcohol     Comment: Hasn't had any

## 2024-06-30 RX ORDER — FLUOXETINE HYDROCHLORIDE 20 MG/1
20 CAPSULE ORAL DAILY
Qty: 30 CAPSULE | Refills: 0 | Status: SHIPPED | OUTPATIENT
Start: 2024-06-30

## 2024-07-08 ENCOUNTER — TELEPHONE (OUTPATIENT)
Dept: FAMILY MEDICINE CLINIC | Age: 59
End: 2024-07-08

## 2024-07-08 NOTE — TELEPHONE ENCOUNTER
----- Message from Christianne Adolfoaiden De La Torre sent at 7/8/2024  8:17 AM EDT -----  Regarding: ECC Appointment Request  ECC Appointment Request    Patient needs appointment for ECC Appointment Type: Existing Condition Follow Up.    Patient Requested Dates(s): Before 24th of this month  Patient Requested Time:After 1:30pm  Provider Name:Juaquin Bowens III, MD    Reason for Appointment Request: Other Regular routine well care visit  --------------------------------------------------------------------------------------------------------------------------    Relationship to Patient: Self     Call Back Information: OK to leave message on voicemail  Preferred Call Back Number: Phone [phone]

## 2024-07-08 NOTE — TELEPHONE ENCOUNTER
Please call patient to let them know Dr. Bowens isn't accepting new patients - can choose alternate provider if they do not wish to see Dr. Beal

## 2024-07-09 NOTE — TELEPHONE ENCOUNTER
Spoke with patient, informed her Dr. Bowens is not accepting new patients. Patient stated she was told he was and will get a phone call back in 24-48 hours. Patient stated she will wait for the call and hung up as I was telling her I was from the office.

## 2024-07-12 ENCOUNTER — TELEPHONE (OUTPATIENT)
Dept: FAMILY MEDICINE CLINIC | Age: 59
End: 2024-07-12

## 2024-07-12 NOTE — TELEPHONE ENCOUNTER
Pt. Called in, she states she should have an appt. Scheduled with Dr. Bowens. I advised her there is not one but I can schedule her one. She was upset she stated Dr. Bowens told her he would see her on his lunch break. I was advising her call backs wouldn't be until 7/15 she was speaking over me, and hung up.

## 2024-07-25 ASSESSMENT — ENCOUNTER SYMPTOMS
APNEA: 0
WHEEZING: 0
BLOOD IN STOOL: 0
SORE THROAT: 0
EYE ITCHING: 0
SHORTNESS OF BREATH: 0
COLOR CHANGE: 0
RHINORRHEA: 0
VOMITING: 0
SINUS PRESSURE: 0
EYE DISCHARGE: 0
BACK PAIN: 0
PHOTOPHOBIA: 0
ABDOMINAL PAIN: 0
DIARRHEA: 0
RECTAL PAIN: 0
CHEST TIGHTNESS: 0
CONSTIPATION: 0
NAUSEA: 0
FACIAL SWELLING: 0
VOICE CHANGE: 0
EYE REDNESS: 0
COUGH: 0
ABDOMINAL DISTENTION: 0
EYE PAIN: 0
TROUBLE SWALLOWING: 0
SINUS PAIN: 0

## 2024-07-25 NOTE — PROGRESS NOTES
Negative for chills, diaphoresis, fatigue and fever.   HENT:  Negative for congestion, dental problem, drooling, ear discharge, ear pain, facial swelling, hearing loss, mouth sores, nosebleeds, postnasal drip, rhinorrhea, sinus pressure, sinus pain, sneezing, sore throat, tinnitus, trouble swallowing and voice change.    Eyes:  Negative for photophobia, pain, discharge, redness, itching and visual disturbance.   Respiratory:  Negative for apnea, cough, chest tightness, shortness of breath and wheezing.    Cardiovascular:  Negative for chest pain, palpitations and leg swelling.   Gastrointestinal:  Negative for abdominal distention, abdominal pain, blood in stool, constipation, diarrhea, nausea, rectal pain and vomiting.   Endocrine: Negative for cold intolerance, heat intolerance, polydipsia, polyphagia and polyuria.   Genitourinary:  Negative for decreased urine volume, difficulty urinating, dysuria, flank pain, frequency, genital sores, hematuria and urgency.   Musculoskeletal:  Negative for arthralgias, back pain, gait problem, joint swelling, myalgias, neck pain and neck stiffness.   Skin:  Negative for color change, rash and wound.   Allergic/Immunologic: Negative for environmental allergies and food allergies.   Neurological:  Negative for dizziness, tremors, seizures, syncope, facial asymmetry, speech difficulty, weakness, light-headedness, numbness and headaches.   Hematological:  Negative for adenopathy. Does not bruise/bleed easily.   Psychiatric/Behavioral:  Negative for agitation, confusion, decreased concentration, hallucinations, self-injury, sleep disturbance and suicidal ideas. The patient is not nervous/anxious.          Objective:   /80   Pulse 95   Resp 16   Ht 1.626 m (5' 4\")   Wt 64 kg (141 lb)   LMP 05/08/2014   SpO2 96%   BMI 24.20 kg/m²     Physical Exam  Constitutional:       General: She is not in acute distress.     Appearance: She is well-developed.   HENT:      Head:

## 2024-07-26 ENCOUNTER — OFFICE VISIT (OUTPATIENT)
Dept: FAMILY MEDICINE CLINIC | Age: 59
End: 2024-07-26
Payer: COMMERCIAL

## 2024-07-26 VITALS
RESPIRATION RATE: 16 BRPM | HEART RATE: 95 BPM | SYSTOLIC BLOOD PRESSURE: 138 MMHG | HEIGHT: 64 IN | WEIGHT: 141 LBS | DIASTOLIC BLOOD PRESSURE: 80 MMHG | OXYGEN SATURATION: 96 % | BODY MASS INDEX: 24.07 KG/M2

## 2024-07-26 DIAGNOSIS — L98.9 SKIN LESION: Primary | ICD-10-CM

## 2024-07-26 DIAGNOSIS — F32.A DEPRESSION, UNSPECIFIED DEPRESSION TYPE: ICD-10-CM

## 2024-07-26 DIAGNOSIS — F41.9 ANXIETY: ICD-10-CM

## 2024-07-26 DIAGNOSIS — M54.50 LOW BACK PAIN, UNSPECIFIED BACK PAIN LATERALITY, UNSPECIFIED CHRONICITY, UNSPECIFIED WHETHER SCIATICA PRESENT: ICD-10-CM

## 2024-07-26 PROCEDURE — 99214 OFFICE O/P EST MOD 30 MIN: CPT | Performed by: INTERNAL MEDICINE

## 2024-07-26 RX ORDER — FLUOXETINE HYDROCHLORIDE 20 MG/1
20 CAPSULE ORAL DAILY
Qty: 90 CAPSULE | Refills: 1 | Status: SHIPPED | OUTPATIENT
Start: 2024-07-26 | End: 2025-01-22

## 2024-08-29 ENCOUNTER — TELEPHONE (OUTPATIENT)
Dept: FAMILY MEDICINE CLINIC | Age: 59
End: 2024-08-29

## 2024-08-29 NOTE — TELEPHONE ENCOUNTER
----- Message from Gene WEEMS sent at 8/29/2024 12:16 PM EDT -----  Regarding: ECC Message to Provider  ECC Message to Provider    Relationship to Patient: Self     Additional Information She would like to have a phone converstion with  Juaquin Bowens MD  about her [employer].  --------------------------------------------------------------------------------------------------------------------------    Call Back Information: Do not leave any message, patient will call back for answer  Preferred Call Back Number: Phone [phone] (home)

## 2024-09-05 ENCOUNTER — TELEPHONE (OUTPATIENT)
Dept: FAMILY MEDICINE CLINIC | Age: 59
End: 2024-09-05

## 2024-09-05 NOTE — TELEPHONE ENCOUNTER
----- Message from Princess REYES sent at 9/5/2024 11:38 AM EDT -----  Regarding: ECC Appointment Request  ECC Appointment Request    Patient needs appointment for ECC Appointment Type: Existing Condition Follow Up. / Virtual Visit     Patient Requested Dates(s): As soon as possible   Patient Requested Time: 9 AM - 5 PM   Provider Name: Juaquin Kincaid MD    Reason for Appointment Request: Established Patient - No appointments available during search   Patient would like to have her appointment with Juaquin Kincaid, to talk about the cancer  on her nose and on what to do about that .    --------------------------------------------------------------------------------------------------------------------------    Relationship to Patient: Self     Call Back Information: OK to leave message on voicemail  Preferred Call Back Number: Phone [phone]

## 2024-09-10 ENCOUNTER — HOSPITAL ENCOUNTER (OUTPATIENT)
Dept: GENERAL RADIOLOGY | Age: 59
Discharge: HOME | End: 2024-09-12
Attending: PAIN MEDICINE
Payer: COMMERCIAL

## 2024-09-10 ENCOUNTER — INITIAL CONSULT (OUTPATIENT)
Age: 59
End: 2024-09-10
Payer: COMMERCIAL

## 2024-09-10 VITALS
TEMPERATURE: 97.9 F | DIASTOLIC BLOOD PRESSURE: 82 MMHG | BODY MASS INDEX: 24.07 KG/M2 | SYSTOLIC BLOOD PRESSURE: 130 MMHG | HEIGHT: 64 IN | WEIGHT: 141 LBS

## 2024-09-10 DIAGNOSIS — M54.2 NECK PAIN: ICD-10-CM

## 2024-09-10 DIAGNOSIS — M54.2 NECK PAIN: Primary | ICD-10-CM

## 2024-09-10 DIAGNOSIS — M25.50 DIFFUSE ARTHRALGIA: ICD-10-CM

## 2024-09-10 PROCEDURE — 99204 OFFICE O/P NEW MOD 45 MIN: CPT | Performed by: PAIN MEDICINE

## 2024-09-10 PROCEDURE — 99203 OFFICE O/P NEW LOW 30 MIN: CPT

## 2024-09-10 PROCEDURE — 72040 X-RAY EXAM NECK SPINE 2-3 VW: CPT

## 2024-09-10 RX ORDER — TRIAMCINOLONE ACETONIDE 1 MG/G
CREAM TOPICAL
COMMUNITY
Start: 2024-08-07

## 2024-09-10 ASSESSMENT — ENCOUNTER SYMPTOMS
CONSTIPATION: 0
DIARRHEA: 0
SHORTNESS OF BREATH: 0
BACK PAIN: 0
NAUSEA: 0

## 2024-09-11 ENCOUNTER — TELEPHONE (OUTPATIENT)
Age: 59
End: 2024-09-11

## 2024-09-23 ENCOUNTER — TELEPHONE (OUTPATIENT)
Dept: FAMILY MEDICINE CLINIC | Age: 59
End: 2024-09-23

## 2024-09-24 ENCOUNTER — TELEPHONE (OUTPATIENT)
Dept: FAMILY MEDICINE CLINIC | Age: 59
End: 2024-09-24

## 2024-09-25 ENCOUNTER — OFFICE VISIT (OUTPATIENT)
Dept: FAMILY MEDICINE CLINIC | Age: 59
End: 2024-09-25
Payer: COMMERCIAL

## 2024-09-25 VITALS
HEART RATE: 82 BPM | OXYGEN SATURATION: 97 % | HEIGHT: 64 IN | WEIGHT: 141.2 LBS | TEMPERATURE: 97.7 F | BODY MASS INDEX: 24.11 KG/M2 | SYSTOLIC BLOOD PRESSURE: 132 MMHG | DIASTOLIC BLOOD PRESSURE: 87 MMHG

## 2024-09-25 DIAGNOSIS — L98.9 SKIN LESION: Primary | ICD-10-CM

## 2024-09-25 DIAGNOSIS — M54.50 LOW BACK PAIN, UNSPECIFIED BACK PAIN LATERALITY, UNSPECIFIED CHRONICITY, UNSPECIFIED WHETHER SCIATICA PRESENT: ICD-10-CM

## 2024-09-25 DIAGNOSIS — Z23 NEED FOR VACCINATION: ICD-10-CM

## 2024-09-25 DIAGNOSIS — F41.9 ANXIETY: ICD-10-CM

## 2024-09-25 PROCEDURE — G0009 ADMIN PNEUMOCOCCAL VACCINE: HCPCS | Performed by: INTERNAL MEDICINE

## 2024-09-25 PROCEDURE — 90677 PCV20 VACCINE IM: CPT | Performed by: INTERNAL MEDICINE

## 2024-09-25 PROCEDURE — 99214 OFFICE O/P EST MOD 30 MIN: CPT | Performed by: INTERNAL MEDICINE

## 2024-09-25 ASSESSMENT — ENCOUNTER SYMPTOMS
EYE REDNESS: 0
SHORTNESS OF BREATH: 0
PHOTOPHOBIA: 0
SINUS PRESSURE: 0
ABDOMINAL DISTENTION: 0
EYE PAIN: 0
DIARRHEA: 0
TROUBLE SWALLOWING: 0
SORE THROAT: 0
COUGH: 0
APNEA: 0
FACIAL SWELLING: 0
ABDOMINAL PAIN: 0
CHEST TIGHTNESS: 0
SINUS PAIN: 0
WHEEZING: 0
BLOOD IN STOOL: 0
NAUSEA: 0
VOMITING: 0
BACK PAIN: 0
COLOR CHANGE: 0
VOICE CHANGE: 0
EYE ITCHING: 0
CONSTIPATION: 0
EYE DISCHARGE: 0
RHINORRHEA: 0
RECTAL PAIN: 0

## 2024-10-07 ENCOUNTER — HOSPITAL ENCOUNTER (OUTPATIENT)
Dept: ORTHOPEDIC SURGERY | Age: 59
Discharge: HOME | End: 2024-10-09
Payer: COMMERCIAL

## 2024-10-07 ENCOUNTER — OFFICE VISIT (OUTPATIENT)
Age: 59
End: 2024-10-07
Payer: COMMERCIAL

## 2024-10-07 VITALS
BODY MASS INDEX: 23.79 KG/M2 | WEIGHT: 138.6 LBS | HEART RATE: 91 BPM | OXYGEN SATURATION: 96 % | SYSTOLIC BLOOD PRESSURE: 128 MMHG | DIASTOLIC BLOOD PRESSURE: 74 MMHG

## 2024-10-07 DIAGNOSIS — M54.50 CHRONIC LOW BACK PAIN, UNSPECIFIED BACK PAIN LATERALITY, UNSPECIFIED WHETHER SCIATICA PRESENT: ICD-10-CM

## 2024-10-07 DIAGNOSIS — M79.642 BILATERAL HAND PAIN: Primary | ICD-10-CM

## 2024-10-07 DIAGNOSIS — M54.2 CHRONIC CERVICAL PAIN: ICD-10-CM

## 2024-10-07 DIAGNOSIS — Z51.81 ENCOUNTER FOR MEDICATION MONITORING: ICD-10-CM

## 2024-10-07 DIAGNOSIS — M79.641 BILATERAL HAND PAIN: ICD-10-CM

## 2024-10-07 DIAGNOSIS — G89.29 CHRONIC LOW BACK PAIN, UNSPECIFIED BACK PAIN LATERALITY, UNSPECIFIED WHETHER SCIATICA PRESENT: ICD-10-CM

## 2024-10-07 DIAGNOSIS — G89.29 CHRONIC CERVICAL PAIN: ICD-10-CM

## 2024-10-07 DIAGNOSIS — M79.642 BILATERAL HAND PAIN: ICD-10-CM

## 2024-10-07 DIAGNOSIS — M79.641 BILATERAL HAND PAIN: Primary | ICD-10-CM

## 2024-10-07 LAB
ALBUMIN SERPL-MCNC: 4.8 G/DL (ref 3.5–4.6)
ALP SERPL-CCNC: 71 U/L (ref 40–130)
ALT SERPL-CCNC: 17 U/L (ref 0–33)
ANION GAP SERPL CALCULATED.3IONS-SCNC: 9 MEQ/L (ref 9–15)
AST SERPL-CCNC: 22 U/L (ref 0–35)
BASOPHILS # BLD: 0.1 K/UL (ref 0–0.2)
BASOPHILS NFR BLD: 0.8 %
BILIRUB SERPL-MCNC: 0.3 MG/DL (ref 0.2–0.7)
BUN SERPL-MCNC: 9 MG/DL (ref 6–20)
CALCIUM SERPL-MCNC: 9.6 MG/DL (ref 8.5–9.9)
CHLORIDE SERPL-SCNC: 98 MEQ/L (ref 95–107)
CO2 SERPL-SCNC: 28 MEQ/L (ref 20–31)
CREAT SERPL-MCNC: 0.55 MG/DL (ref 0.5–0.9)
CRP SERPL HS-MCNC: <3 MG/L (ref 0–5)
EOSINOPHIL # BLD: 0.1 K/UL (ref 0–0.7)
EOSINOPHIL NFR BLD: 0.8 %
ERYTHROCYTE [DISTWIDTH] IN BLOOD BY AUTOMATED COUNT: 12.6 % (ref 11.5–14.5)
ERYTHROCYTE [SEDIMENTATION RATE] IN BLOOD BY WESTERGREN METHOD: 13 MM (ref 0–30)
GLOBULIN SER CALC-MCNC: 2.6 G/DL (ref 2.3–3.5)
GLUCOSE SERPL-MCNC: 71 MG/DL (ref 70–99)
HCT VFR BLD AUTO: 40.2 % (ref 37–47)
HGB BLD-MCNC: 13.4 G/DL (ref 12–16)
LYMPHOCYTES # BLD: 4 K/UL (ref 1–4.8)
LYMPHOCYTES NFR BLD: 40.3 %
MCH RBC QN AUTO: 31.5 PG (ref 27–31.3)
MCHC RBC AUTO-ENTMCNC: 33.3 % (ref 33–37)
MCV RBC AUTO: 94.6 FL (ref 79.4–94.8)
MONOCYTES # BLD: 0.5 K/UL (ref 0.2–0.8)
MONOCYTES NFR BLD: 4.7 %
NEUTROPHILS # BLD: 5.3 K/UL (ref 1.4–6.5)
NEUTS SEG NFR BLD: 53.1 %
PLATELET # BLD AUTO: 408 K/UL (ref 130–400)
POTASSIUM SERPL-SCNC: 3.8 MEQ/L (ref 3.4–4.9)
PROT SERPL-MCNC: 7.4 G/DL (ref 6.3–8)
RBC # BLD AUTO: 4.25 M/UL (ref 4.2–5.4)
SODIUM SERPL-SCNC: 135 MEQ/L (ref 135–144)
WBC # BLD AUTO: 10 K/UL (ref 4.8–10.8)

## 2024-10-07 PROCEDURE — 73120 X-RAY EXAM OF HAND: CPT

## 2024-10-07 PROCEDURE — 99203 OFFICE O/P NEW LOW 30 MIN: CPT | Performed by: INTERNAL MEDICINE

## 2024-10-07 PROCEDURE — 99203 OFFICE O/P NEW LOW 30 MIN: CPT

## 2024-10-07 ASSESSMENT — ENCOUNTER SYMPTOMS
SHORTNESS OF BREATH: 0
EYE PAIN: 0
BACK PAIN: 1
ABDOMINAL PAIN: 0
EYE REDNESS: 0

## 2024-10-07 NOTE — PATIENT INSTRUCTIONS
I would like to obtain a few labs on you today as well as x-rays of your hands to make sure you do not have rheumatoid arthritis.  If these are normal I am going to recommend that you follow-up with pain management and your PCP for further treatment of your chronic pain.    If your labs are abnormal I am going to ask you to come back to discuss results and possible treatment options but right now I do not see evidence of rheumatoid arthritis.

## 2024-10-07 NOTE — PROGRESS NOTES
Effexor [Venlafaxine]      diarrhea    Seasonal Other (See Comments)     sinus congestion       FAMILY HISTORY:       Problem Relation Age of Onset    Heart Failure Mother         dec 84    Osteoarthritis Mother     Cancer Mother 60        breast     Breast Cancer Mother 60    Heart Attack Father         dec 62    Hypertension Father     High Cholesterol Father     Arthritis Father     Stroke Sister     No Known Problems Son          SOCIAL HISTORY:   Social History     Socioeconomic History    Marital status: Single     Spouse name: Not on file    Number of children: 2    Years of education: 11    Highest education level: Not on file   Occupational History    Occupation: , now on [employer]   Tobacco Use    Smoking status: Former     Current packs/day: 0.00     Average packs/day: 1 pack/day for 20.0 years (20.0 ttl pk-yrs)     Types: Cigarettes     Start date: 1997     Quit date: 2017     Years since quittin.9    Smokeless tobacco: Never   Vaping Use    Vaping status: Never Used   Substance and Sexual Activity    Alcohol use: Yes     Alcohol/week: 0.0 standard drinks of alcohol     Comment: Hasn't had any alcohol since 2018    Drug use: No    Sexual activity: Not Currently   Other Topics Concern    Not on file   Social History Narrative    Born in [place], one of 3 siblings, 2 half sisters (does not talk to them since )    Single, , 2 sons, in [place] and [place]    Grandchildren 5, does some babysitting    Lives in a mobil home in [place] alone    Worked as a  x 15 years    [employer], attends [employer]     On Medicare for depression x      Social Determinants of Health     Financial Resource Strain: Medium Risk (3/4/2024)    Overall Financial Resource Strain (CARDIA)     Difficulty of Paying Living Expenses: Somewhat hard   Food Insecurity: No Food Insecurity (3/4/2024)    Hunger Vital Sign     Worried About Running Out of Food in the Last Year: Never true

## 2024-10-08 LAB
CCP AB SER IA-ACNC: 0.6 U/ML (ref 0–7)
RHEUMATOID FACTOR: 12 IU/ML (ref 0–13)

## 2024-10-29 NOTE — PROGRESS NOTES
Asthma     Breast cancer (HCC)     Chronic back pain greater than 3 months duration     Dr Flynn    COPD (chronic obstructive pulmonary disease) ([facility])     Dr Hubbard    Depression     Sergey Guan ([place]), hospital admissions, counseling [facility]    Family history of malignant neoplasm of breast     H/O vitamin D deficiency     History of therapeutic radiation     Invasive ductal carcinoma of left breast ([facility]) 2018    T1N0M0 ERPR+ her2-, radiation, no chemo, Dr Pearson    Spondylosis     Dr Flynn     Past Surgical History:   Procedure Laterality Date    BREAST CYST EXCISION Right 2000    benign    BREAST LUMPECTOMY      and [facility] evaluation    DILATION AND CURETTAGE OF UTERUS      due to AUB    FINGER SURGERY      right index finger / exc tumor mass / at age 11    KS BX BREAST W/DEVICE 1ST LESION ULTRASOUND GUID Left 2018    US guided left breast biopsy, excisional    [facility] EXC CYST/ABERRANT BREAST TISSUE OPEN 1/> LESION Left 2018    RE EXCISION BREAST CANCER SITE, SENTINEL NODE BIOPSY    TONSILLECTOMY      at age 6     Social History     Socioeconomic History    Marital status: Single     Spouse name: Not on file    Number of children: 2    Years of education: 11    Highest education level: Not on file   Occupational History    Occupation: , now on [employer]   Tobacco Use    Smoking status: Former     Current packs/day: 0.00     Average packs/day: 1 pack/day for 20.0 years (20.0 ttl pk-yrs)     Types: Cigarettes     Start date: 1997     Quit date: 2017     Years since quittin.9    Smokeless tobacco: Never   Vaping Use    Vaping status: Never Used   Substance and Sexual Activity    Alcohol use: Yes     Alcohol/week: 0.0 standard drinks of alcohol     Comment: Hasn't had any alcohol since 2018    Drug use: No    Sexual activity: Not Currently   Other Topics Concern    Not on file   Social History Narrative    Born in [place], one of 3 siblings, 2 half sisters

## 2024-10-30 ENCOUNTER — OFFICE VISIT (OUTPATIENT)
Dept: FAMILY MEDICINE CLINIC | Age: 59
End: 2024-10-30
Payer: COMMERCIAL

## 2024-10-30 VITALS
HEART RATE: 81 BPM | DIASTOLIC BLOOD PRESSURE: 80 MMHG | WEIGHT: 140 LBS | SYSTOLIC BLOOD PRESSURE: 134 MMHG | HEIGHT: 64 IN | OXYGEN SATURATION: 96 % | BODY MASS INDEX: 23.9 KG/M2 | RESPIRATION RATE: 14 BRPM

## 2024-10-30 DIAGNOSIS — J44.9 CHRONIC OBSTRUCTIVE PULMONARY DISEASE, UNSPECIFIED COPD TYPE (HCC): ICD-10-CM

## 2024-10-30 DIAGNOSIS — L98.9 SKIN LESION: ICD-10-CM

## 2024-10-30 DIAGNOSIS — Z12.12 SCREENING FOR COLORECTAL CANCER: ICD-10-CM

## 2024-10-30 DIAGNOSIS — F32.A DEPRESSION, UNSPECIFIED DEPRESSION TYPE: ICD-10-CM

## 2024-10-30 DIAGNOSIS — Z12.11 SCREENING FOR COLORECTAL CANCER: ICD-10-CM

## 2024-10-30 DIAGNOSIS — F41.9 ANXIETY: Primary | ICD-10-CM

## 2024-10-30 PROCEDURE — 99214 OFFICE O/P EST MOD 30 MIN: CPT | Performed by: INTERNAL MEDICINE

## 2024-11-24 LAB — NONINV COLON CA DNA+OCC BLD SCRN STL QL: NEGATIVE

## 2024-12-18 NOTE — PROGRESS NOTES
evaluation    DILATION AND CURETTAGE OF UTERUS      due to AUB    FINGER SURGERY      right index finger / exc tumor mass / at age 11    [place] BX BREAST W/DEVICE 1ST LESION ULTRASOUND GUID Left 2018    US guided left breast biopsy, excisional    [place] EXC CYST/ABERRANT BREAST TISSUE OPEN 1/> LESION Left 2018    RE EXCISION BREAST CANCER SITE, SENTINEL NODE BIOPSY    TONSILLECTOMY      at age 6     Social History     Socioeconomic History    Marital status: Single     Spouse name: Not on file    Number of children: 2    Years of education: 11    Highest education level: Not on file   Occupational History    Occupation: , now on [employer]   Tobacco Use    Smoking status: Former     Current packs/day: 0.00     Average packs/day: 1 pack/day for 20.0 years (20.0 ttl pk-yrs)     Types: Cigarettes     Start date: 1997     Quit date: 2017     Years since quittin.1    Smokeless tobacco: Never   Vaping Use    Vaping status: Never Used   Substance and Sexual Activity    Alcohol use: Yes     Alcohol/week: 0.0 standard drinks of alcohol     Comment: Hasn't had any alcohol since 2018    Drug use: No    Sexual activity: Not Currently   Other Topics Concern    Not on file   Social History Narrative    Born in [place], one of 3 siblings, 2 half sisters (does not talk to them since )    Single, , 2 sons, in [place] and [place]    Grandchildren 5, does some babysitting    Lives in a mobil home in [place] alone    Worked as a  x 15 years    Nondenominational, attends [employer]     On Medicare for depression x      Social Determinants of Health     Financial Resource Strain: Medium Risk (3/4/2024)    Overall Financial Resource Strain (CARDIA)     Difficulty of Paying Living Expenses: Somewhat hard   Food Insecurity: No Food Insecurity (3/4/2024)    Hunger Vital Sign     Worried About Running Out of Food in the Last Year: Never true     Ran Out of Food in the Last Year: Never true

## 2024-12-18 NOTE — PROGRESS NOTES
bedtime Indications: current script  Provider, Historical, MD       CareTeam (Including outside providers/suppliers regularly involved in providing care):   Patient Care Team:  Juaquin Bowens MD as PCP - General (Internal Medicine)  Juaquin Bowens MD as PCP - Empaneled Provider      Reviewed and updated this visit:  Allergies  Meds              This encounter was performed under Juaquin shepherd MD’s, direct supervision, 12/27/2024.     This encounter was performed under Juaquin shepherd MD’s, direct supervision, 12/27/2024.

## 2024-12-27 ENCOUNTER — OFFICE VISIT (OUTPATIENT)
Age: 59
End: 2024-12-27

## 2024-12-27 VITALS
WEIGHT: 143 LBS | DIASTOLIC BLOOD PRESSURE: 70 MMHG | RESPIRATION RATE: 14 BRPM | BODY MASS INDEX: 24.41 KG/M2 | SYSTOLIC BLOOD PRESSURE: 112 MMHG | HEIGHT: 64 IN | OXYGEN SATURATION: 98 % | HEART RATE: 87 BPM

## 2024-12-27 VITALS
RESPIRATION RATE: 14 BRPM | WEIGHT: 143 LBS | OXYGEN SATURATION: 96 % | HEART RATE: 69 BPM | BODY MASS INDEX: 24.41 KG/M2 | DIASTOLIC BLOOD PRESSURE: 70 MMHG | SYSTOLIC BLOOD PRESSURE: 112 MMHG | HEIGHT: 64 IN

## 2024-12-27 DIAGNOSIS — Z87.891 PERSONAL HISTORY OF TOBACCO USE: ICD-10-CM

## 2024-12-27 DIAGNOSIS — M19.90 OSTEOARTHRITIS, UNSPECIFIED OSTEOARTHRITIS TYPE, UNSPECIFIED SITE: Primary | ICD-10-CM

## 2024-12-27 DIAGNOSIS — Z00.00 ANNUAL WELLNESS VISIT: ICD-10-CM

## 2024-12-27 DIAGNOSIS — Z00.00 WELCOME TO MEDICARE PREVENTIVE VISIT: Primary | ICD-10-CM

## 2024-12-27 RX ORDER — FEXOFENADINE HCL 180 MG/1
180 TABLET ORAL DAILY
Qty: 30 TABLET | Refills: 1 | Status: SHIPPED | OUTPATIENT
Start: 2024-12-27 | End: 2025-02-25

## 2024-12-27 RX ORDER — TRAMADOL HYDROCHLORIDE 50 MG/1
50 TABLET ORAL EVERY 8 HOURS PRN
Qty: 21 TABLET | Refills: 0 | Status: SHIPPED | OUTPATIENT
Start: 2024-12-27 | End: 2025-01-03

## 2024-12-27 ASSESSMENT — PATIENT HEALTH QUESTIONNAIRE - PHQ9
SUM OF ALL RESPONSES TO PHQ QUESTIONS 1-9: 1
1. LITTLE INTEREST OR PLEASURE IN DOING THINGS: SEVERAL DAYS
5. POOR APPETITE OR OVEREATING: NOT AT ALL
6. FEELING BAD ABOUT YOURSELF - OR THAT YOU ARE A FAILURE OR HAVE LET YOURSELF OR YOUR FAMILY DOWN: NOT AT ALL
7. TROUBLE CONCENTRATING ON THINGS, SUCH AS READING THE NEWSPAPER OR WATCHING TELEVISION: NOT AT ALL
9. THOUGHTS THAT YOU WOULD BE BETTER OFF DEAD, OR OF HURTING YOURSELF: NOT AT ALL
10. IF YOU CHECKED OFF ANY PROBLEMS, HOW DIFFICULT HAVE THESE PROBLEMS MADE IT FOR YOU TO DO YOUR WORK, TAKE CARE OF THINGS AT HOME, OR GET ALONG WITH OTHER PEOPLE: NOT DIFFICULT AT ALL
8. MOVING OR SPEAKING SO SLOWLY THAT OTHER PEOPLE COULD HAVE NOTICED. OR THE OPPOSITE, BEING SO FIGETY OR RESTLESS THAT YOU HAVE BEEN MOVING AROUND A LOT MORE THAN USUAL: NOT AT ALL
SUM OF ALL RESPONSES TO PHQ QUESTIONS 1-9: 1
4. FEELING TIRED OR HAVING LITTLE ENERGY: NOT AT ALL
SUM OF ALL RESPONSES TO PHQ QUESTIONS 1-9: 1
SUM OF ALL RESPONSES TO PHQ9 QUESTIONS 1 & 2: 1
2. FEELING DOWN, DEPRESSED OR HOPELESS: NOT AT ALL
3. TROUBLE FALLING OR STAYING ASLEEP: NOT AT ALL
SUM OF ALL RESPONSES TO PHQ QUESTIONS 1-9: 1

## 2025-01-17 ENCOUNTER — TELEPHONE (OUTPATIENT)
Age: 60
End: 2025-01-17

## 2025-01-23 ENCOUNTER — TELEMEDICINE (OUTPATIENT)
Age: 60
End: 2025-01-23
Payer: MEDICARE

## 2025-01-23 DIAGNOSIS — M19.90 OSTEOARTHRITIS, UNSPECIFIED OSTEOARTHRITIS TYPE, UNSPECIFIED SITE: Primary | ICD-10-CM

## 2025-01-23 PROCEDURE — 99214 OFFICE O/P EST MOD 30 MIN: CPT | Performed by: INTERNAL MEDICINE

## 2025-01-23 RX ORDER — AMOXICILLIN AND CLAVULANATE POTASSIUM 600; 42.9 MG/5ML; MG/5ML
POWDER, FOR SUSPENSION ORAL
Qty: 75 ML | Refills: 0 | Status: SHIPPED | OUTPATIENT
Start: 2025-01-23

## 2025-01-23 RX ORDER — TRAMADOL HYDROCHLORIDE 50 MG/1
50 TABLET ORAL EVERY 8 HOURS PRN
Qty: 21 TABLET | Refills: 0 | Status: SHIPPED | OUTPATIENT
Start: 2025-01-23 | End: 2025-01-30

## 2025-01-23 SDOH — ECONOMIC STABILITY: TRANSPORTATION INSECURITY
IN THE PAST 12 MONTHS, HAS THE LACK OF TRANSPORTATION KEPT YOU FROM MEDICAL APPOINTMENTS OR FROM GETTING MEDICATIONS?: YES

## 2025-01-23 SDOH — ECONOMIC STABILITY: FOOD INSECURITY: WITHIN THE PAST 12 MONTHS, THE FOOD YOU BOUGHT JUST DIDN'T LAST AND YOU DIDN'T HAVE MONEY TO GET MORE.: PATIENT DECLINED

## 2025-01-23 SDOH — ECONOMIC STABILITY: INCOME INSECURITY: IN THE LAST 12 MONTHS, WAS THERE A TIME WHEN YOU WERE NOT ABLE TO PAY THE MORTGAGE OR RENT ON TIME?: PATIENT DECLINED

## 2025-01-23 SDOH — ECONOMIC STABILITY: FOOD INSECURITY: WITHIN THE PAST 12 MONTHS, THE FOOD YOU BOUGHT JUST DIDN'T LAST AND YOU DIDN'T HAVE MONEY TO GET MORE.: NEVER TRUE

## 2025-01-23 SDOH — ECONOMIC STABILITY: FOOD INSECURITY: WITHIN THE PAST 12 MONTHS, YOU WORRIED THAT YOUR FOOD WOULD RUN OUT BEFORE YOU GOT MONEY TO BUY MORE.: PATIENT DECLINED

## 2025-01-23 SDOH — ECONOMIC STABILITY: FOOD INSECURITY: WITHIN THE PAST 12 MONTHS, YOU WORRIED THAT YOUR FOOD WOULD RUN OUT BEFORE YOU GOT MONEY TO BUY MORE.: NEVER TRUE

## 2025-01-23 SDOH — ECONOMIC STABILITY: TRANSPORTATION INSECURITY
IN THE PAST 12 MONTHS, HAS LACK OF TRANSPORTATION KEPT YOU FROM MEETINGS, WORK, OR FROM GETTING THINGS NEEDED FOR DAILY LIVING?: NO

## 2025-01-23 ASSESSMENT — PATIENT HEALTH QUESTIONNAIRE - PHQ9
4. FEELING TIRED OR HAVING LITTLE ENERGY: SEVERAL DAYS
SUM OF ALL RESPONSES TO PHQ9 QUESTIONS 1 & 2: 2
3. TROUBLE FALLING OR STAYING ASLEEP: NOT AT ALL
2. FEELING DOWN, DEPRESSED OR HOPELESS: SEVERAL DAYS
5. POOR APPETITE OR OVEREATING: NOT AT ALL
7. TROUBLE CONCENTRATING ON THINGS, SUCH AS READING THE NEWSPAPER OR WATCHING TELEVISION: SEVERAL DAYS
8. MOVING OR SPEAKING SO SLOWLY THAT OTHER PEOPLE COULD HAVE NOTICED. OR THE OPPOSITE, BEING SO FIGETY OR RESTLESS THAT YOU HAVE BEEN MOVING AROUND A LOT MORE THAN USUAL: NOT AT ALL
1. LITTLE INTEREST OR PLEASURE IN DOING THINGS: SEVERAL DAYS
SUM OF ALL RESPONSES TO PHQ QUESTIONS 1-9: 4
9. THOUGHTS THAT YOU WOULD BE BETTER OFF DEAD, OR OF HURTING YOURSELF: NOT AT ALL
10. IF YOU CHECKED OFF ANY PROBLEMS, HOW DIFFICULT HAVE THESE PROBLEMS MADE IT FOR YOU TO DO YOUR WORK, TAKE CARE OF THINGS AT HOME, OR GET ALONG WITH OTHER PEOPLE: NOT DIFFICULT AT ALL
SUM OF ALL RESPONSES TO PHQ QUESTIONS 1-9: 4
6. FEELING BAD ABOUT YOURSELF - OR THAT YOU ARE A FAILURE OR HAVE LET YOURSELF OR YOUR FAMILY DOWN: NOT AT ALL

## 2025-01-23 NOTE — PROGRESS NOTES
legal guardian's) verbal consent. She has not had a related appointment within my department in the past 7 days or scheduled within the next 24 hours.   The patient was located at Home: [address]  [facility] [address].  The provider was located at Facility (Appt Dept): [address] [place] [address].  Confirm you are appropriately licensed, registered, or certified to deliver care in the state where the patient is located as indicated above. If you are not or unsure, please re-schedule the visit: Yes, I confirm.             TELEHEALTH EVALUATION -- Audio/Visual (During COVID-19 public health emergency)    -   Elida Whitehead is a 59 y.o. female being evaluated by a Virtual Visit (video visit) encounter to address concerns as mentioned above.  A caregiver was present when appropriate. Due to this being a TeleHealth encounter (During COVID-19 public health emergency), evaluation of the following organ systems was limited: Vitals/Constitutional/EENT/Resp/CV/GI//MS/Neuro/Skin/Heme-Lymph-Imm.  Pursuant to the emergency declaration under the [place] Act and the National Emergencies Act, 1135 waiver authority and the Coronavirus Preparedness and Response Supplemental Appropriations Act, this Virtual Visit was conducted with patient's (and/or legal guardian's) consent, to reduce the patient's risk of exposure to COVID-19 and provide necessary medical care.  The patient (and/or legal guardian) has also been advised to contact this office for worsening conditions or problems, and seek emergency medical treatment and/or call 911 if deemed necessary.     Services were provided through a video synchronous discussion virtually to substitute for in-person clinic visit. Type of encounter was __ Doxy _x_ MyChart ___Facetime    Patient was located at their home. Provider was located at their ___ home or        __x__ office.     --Juaquin Bowens MD on 1/23/2025 at 9:13 AM    An electronic signature was used to

## 2025-02-03 ENCOUNTER — TELEPHONE (OUTPATIENT)
Age: 60
End: 2025-02-03

## 2025-02-03 RX ORDER — AMOXICILLIN AND CLAVULANATE POTASSIUM 600; 42.9 MG/5ML; MG/5ML
POWDER, FOR SUSPENSION ORAL
Qty: 75 ML | Refills: 0 | Status: SHIPPED | OUTPATIENT
Start: 2025-02-03

## 2025-02-03 NOTE — TELEPHONE ENCOUNTER
Called patient to schedule appointment that was requested and she stated she wanted me to send a message back to Dr. Bowens as she did not want to make an appointment if she didn't need to.    States that she took the antibiotic that was prescribed twice a day but, it only lasted from 1/23/2025 to 1/27/2025 instead of 7 days.    Patient is inquiring on what Dr. Bowens would like her to do. If he can call in a new prescription or if she needs an appointment.    Please advise.

## 2025-02-21 ENCOUNTER — PATIENT MESSAGE (OUTPATIENT)
Age: 60
End: 2025-02-21

## 2025-02-21 NOTE — TELEPHONE ENCOUNTER
Left voicemail to please call into office at [phone]  We need to know exact name of script and pharmacy you would like this to go to

## 2025-02-25 ENCOUNTER — TELEMEDICINE (OUTPATIENT)
Age: 60
End: 2025-02-25
Payer: MEDICARE

## 2025-02-25 DIAGNOSIS — E55.9 VITAMIN D DEFICIENCY: Primary | ICD-10-CM

## 2025-02-25 PROCEDURE — 99214 OFFICE O/P EST MOD 30 MIN: CPT | Performed by: INTERNAL MEDICINE

## 2025-02-25 RX ORDER — PANTOPRAZOLE SODIUM 40 MG/1
40 TABLET, DELAYED RELEASE ORAL
Qty: 90 TABLET | Refills: 3 | Status: SHIPPED | OUTPATIENT
Start: 2025-02-25 | End: 2026-02-20

## 2025-02-25 RX ORDER — CETIRIZINE HYDROCHLORIDE 10 MG/1
10 TABLET ORAL DAILY
Qty: 60 TABLET | Refills: 0 | Status: SHIPPED | OUTPATIENT
Start: 2025-02-25 | End: 2025-04-26

## 2025-02-25 NOTE — PROGRESS NOTES
Elida Whitehead (:  1965) is a 59 y.o. female, Established patient, here for evaluation of the following chief complaint(s):  Discuss Medications          Subjective   History of Present Illness  The patient presents for evaluation of multiple medical concerns.    Allergies: The patient is experiencing allergies that have not been responsive to Allegra.  She is attempted to use Flonase in the past but this is because epistaxis.    Major depression: She is currently taking Prozac 20 mg for anxiety, which she reports has been effective.      Health maintenance: The patient's Cologuard test was negative on 2024.  Next due 2027    Her anxiety is well-managed with Prozac.    She is currently on Flexeril for fibromyalgia, which she reports as effective.      She has significantly reduced her smoking habit, currently consuming only two cigarettes per day.        SOCIAL HISTORY  The patient has cut down smoking to approximately two cigarettes a day.    MEDICATIONS  Current: Ultram, Prozac, Flexeril, Sudafed.  Discontinued: Flonase, azithromycin.    Past Medical History:   Diagnosis Date    Asthma     Breast cancer ([facility])     Chronic back pain greater than 3 months duration     Dr Flynn    COPD (chronic obstructive pulmonary disease) ([facility])     Dr Hubbard    Depression     Sergey Guan ([place]), hospital admissions, counseling [facility]    Family history of malignant neoplasm of breast     H/O vitamin D deficiency     History of therapeutic radiation     Invasive ductal carcinoma of left breast ([facility]) 2018    T1N0M0 ERPR+ her2-, radiation, no chemo, Dr Pearson    Spondylosis     Dr Flynn     Past Surgical History:   Procedure Laterality Date    BREAST CYST EXCISION Right     benign    BREAST LUMPECTOMY      and [facility] evaluation    DILATION AND CURETTAGE OF UTERUS      due to AUB    FINGER SURGERY      right index finger / exc tumor mass / at age 11    [place] BX BREAST W/DEVICE 1ST

## 2025-03-20 ENCOUNTER — TRANSCRIBE ORDERS (OUTPATIENT)
Dept: ADMINISTRATIVE | Age: 60
End: 2025-03-20

## 2025-03-20 DIAGNOSIS — Z12.31 ENCOUNTER FOR SCREENING MAMMOGRAM FOR MALIGNANT NEOPLASM OF BREAST: Primary | ICD-10-CM

## 2025-04-23 RX ORDER — CETIRIZINE HYDROCHLORIDE 10 MG/1
10 TABLET ORAL DAILY
Qty: 60 TABLET | Refills: 0 | Status: SHIPPED | OUTPATIENT
Start: 2025-04-23 | End: 2025-06-22

## 2025-04-23 NOTE — TELEPHONE ENCOUNTER
Comments:     Last Office Visit (last PCP visit):   2/25/2025    Next Visit Date:  Future Appointments   Date Time Provider Department Center   5/28/2025 10:20 AM [facility] ROOM 1 [facility]       **If hasn't been seen in over a year OR hasn't followed up according to last diabetes/ADHD visit, make appointment for patient before sending refill to provider.    Rx requested:  Requested Prescriptions     Pending Prescriptions Disp Refills    cetirizine (ZYRTEC) 10 MG tablet 60 tablet 0     Sig: Take 1 tablet by mouth daily

## 2025-05-28 ENCOUNTER — HOSPITAL ENCOUNTER (OUTPATIENT)
Dept: WOMENS IMAGING | Age: 60
Discharge: HOME | End: 2025-05-30
Attending: INTERNAL MEDICINE
Payer: MEDICARE

## 2025-05-28 DIAGNOSIS — Z12.31 ENCOUNTER FOR SCREENING MAMMOGRAM FOR MALIGNANT NEOPLASM OF BREAST: ICD-10-CM

## 2025-05-28 PROCEDURE — 77063 BREAST TOMOSYNTHESIS BI: CPT

## 2025-06-28 NOTE — TELEPHONE ENCOUNTER
Future Appointments    Encounter Information   Provider Department Appt Notes   7/3/2025 Juaqiun Bowens MD [facility] Need prescription for allergies     Past Visits    Date Provider Specialty Visit Type Primary Dx   02/25/2025 Juaquin Bowens MD Family Medicine Telemedicine Vitamin D deficiency

## 2025-07-03 ENCOUNTER — OFFICE VISIT (OUTPATIENT)
Age: 60
End: 2025-07-03
Payer: MEDICARE

## 2025-07-03 VITALS
RESPIRATION RATE: 14 BRPM | OXYGEN SATURATION: 98 % | HEIGHT: 64 IN | HEART RATE: 88 BPM | WEIGHT: 150 LBS | SYSTOLIC BLOOD PRESSURE: 114 MMHG | BODY MASS INDEX: 25.61 KG/M2 | DIASTOLIC BLOOD PRESSURE: 64 MMHG

## 2025-07-03 DIAGNOSIS — F33.2 MAJOR DEPRESSIVE DISORDER, RECURRENT SEVERE WITHOUT PSYCHOTIC FEATURES (HCC): Primary | ICD-10-CM

## 2025-07-03 DIAGNOSIS — Z00.00 MEDICARE ANNUAL WELLNESS VISIT, SUBSEQUENT: ICD-10-CM

## 2025-07-03 DIAGNOSIS — R11.0 NAUSEA: ICD-10-CM

## 2025-07-03 DIAGNOSIS — F41.9 ANXIETY: ICD-10-CM

## 2025-07-03 DIAGNOSIS — Z87.891 PERSONAL HISTORY OF TOBACCO USE: ICD-10-CM

## 2025-07-03 DIAGNOSIS — R53.83 FATIGUE, UNSPECIFIED TYPE: ICD-10-CM

## 2025-07-03 PROBLEM — J44.9 COPD (CHRONIC OBSTRUCTIVE PULMONARY DISEASE) (HCC): Status: RESOLVED | Noted: 2017-01-01 | Resolved: 2025-07-03

## 2025-07-03 PROBLEM — C50.912 INVASIVE DUCTAL CARCINOMA OF LEFT BREAST (HCC): Status: RESOLVED | Noted: 2018-05-01 | Resolved: 2025-07-03

## 2025-07-03 LAB
ALBUMIN SERPL-MCNC: 4.6 G/DL (ref 3.5–4.6)
ALP SERPL-CCNC: 73 U/L (ref 40–130)
ALT SERPL-CCNC: 19 U/L (ref 0–33)
ANION GAP SERPL CALCULATED.3IONS-SCNC: 14 MEQ/L (ref 9–15)
AST SERPL-CCNC: 25 U/L (ref 0–35)
BASOPHILS # BLD: 0.1 K/UL (ref 0–0.2)
BASOPHILS NFR BLD: 1.1 %
BILIRUB SERPL-MCNC: 0.3 MG/DL (ref 0.2–0.7)
BUN SERPL-MCNC: 13 MG/DL (ref 6–20)
CALCIUM SERPL-MCNC: 9.4 MG/DL (ref 8.5–9.9)
CHLORIDE SERPL-SCNC: 102 MEQ/L (ref 95–107)
CO2 SERPL-SCNC: 23 MEQ/L (ref 20–31)
CREAT SERPL-MCNC: 0.52 MG/DL (ref 0.5–0.9)
EOSINOPHIL # BLD: 0.2 K/UL (ref 0–0.7)
EOSINOPHIL NFR BLD: 2.4 %
ERYTHROCYTE [DISTWIDTH] IN BLOOD BY AUTOMATED COUNT: 12.1 % (ref 11.5–14.5)
GLOBULIN SER CALC-MCNC: 2.5 G/DL (ref 2.3–3.5)
GLUCOSE SERPL-MCNC: 93 MG/DL (ref 70–99)
HCT VFR BLD AUTO: 38.9 % (ref 37–47)
HGB BLD-MCNC: 13 G/DL (ref 12–16)
LYMPHOCYTES # BLD: 3.2 K/UL (ref 1–4.8)
LYMPHOCYTES NFR BLD: 33.5 %
MCH RBC QN AUTO: 31.3 PG (ref 27–31.3)
MCHC RBC AUTO-ENTMCNC: 33.4 % (ref 33–37)
MCV RBC AUTO: 93.5 FL (ref 79.4–94.8)
MONOCYTES # BLD: 0.6 K/UL (ref 0.2–0.8)
MONOCYTES NFR BLD: 6.2 %
NEUTROPHILS # BLD: 5.4 K/UL (ref 1.4–6.5)
NEUTS SEG NFR BLD: 56.5 %
PLATELET # BLD AUTO: 360 K/UL (ref 130–400)
POTASSIUM SERPL-SCNC: 4.3 MEQ/L (ref 3.4–4.9)
PROT SERPL-MCNC: 7.1 G/DL (ref 6.3–8)
RBC # BLD AUTO: 4.16 M/UL (ref 4.2–5.4)
SODIUM SERPL-SCNC: 139 MEQ/L (ref 135–144)
TSH SERPL-MCNC: 2.14 UIU/ML (ref 0.44–3.86)
WBC # BLD AUTO: 9.5 K/UL (ref 4.8–10.8)

## 2025-07-03 PROCEDURE — G0296 VISIT TO DETERM LDCT ELIG: HCPCS | Performed by: INTERNAL MEDICINE

## 2025-07-03 PROCEDURE — G0439 PPPS, SUBSEQ VISIT: HCPCS | Performed by: INTERNAL MEDICINE

## 2025-07-03 PROCEDURE — 99214 OFFICE O/P EST MOD 30 MIN: CPT | Performed by: INTERNAL MEDICINE

## 2025-07-03 RX ORDER — FEXOFENADINE HCL 180 MG/1
180 TABLET ORAL DAILY
Qty: 90 TABLET | Refills: 1 | Status: SHIPPED | OUTPATIENT
Start: 2025-07-03

## 2025-07-03 RX ORDER — ONDANSETRON 4 MG/1
4 TABLET, ORALLY DISINTEGRATING ORAL 3 TIMES DAILY PRN
Qty: 21 TABLET | Refills: 0 | Status: SHIPPED | OUTPATIENT
Start: 2025-07-03 | End: 2025-07-10

## 2025-07-03 ASSESSMENT — PATIENT HEALTH QUESTIONNAIRE - PHQ9
SUM OF ALL RESPONSES TO PHQ QUESTIONS 1-9: 0
2. FEELING DOWN, DEPRESSED OR HOPELESS: NOT AT ALL
SUM OF ALL RESPONSES TO PHQ QUESTIONS 1-9: 0
SUM OF ALL RESPONSES TO PHQ QUESTIONS 1-9: 0
9. THOUGHTS THAT YOU WOULD BE BETTER OFF DEAD, OR OF HURTING YOURSELF: NOT AT ALL
8. MOVING OR SPEAKING SO SLOWLY THAT OTHER PEOPLE COULD HAVE NOTICED. OR THE OPPOSITE, BEING SO FIGETY OR RESTLESS THAT YOU HAVE BEEN MOVING AROUND A LOT MORE THAN USUAL: NOT AT ALL
3. TROUBLE FALLING OR STAYING ASLEEP: NOT AT ALL
4. FEELING TIRED OR HAVING LITTLE ENERGY: NOT AT ALL
6. FEELING BAD ABOUT YOURSELF - OR THAT YOU ARE A FAILURE OR HAVE LET YOURSELF OR YOUR FAMILY DOWN: NOT AT ALL
1. LITTLE INTEREST OR PLEASURE IN DOING THINGS: NOT AT ALL
7. TROUBLE CONCENTRATING ON THINGS, SUCH AS READING THE NEWSPAPER OR WATCHING TELEVISION: NOT AT ALL
SUM OF ALL RESPONSES TO PHQ QUESTIONS 1-9: 0
10. IF YOU CHECKED OFF ANY PROBLEMS, HOW DIFFICULT HAVE THESE PROBLEMS MADE IT FOR YOU TO DO YOUR WORK, TAKE CARE OF THINGS AT HOME, OR GET ALONG WITH OTHER PEOPLE: NOT DIFFICULT AT ALL
5. POOR APPETITE OR OVEREATING: NOT AT ALL

## 2025-07-03 NOTE — PROGRESS NOTES
Elida Whitehead (:  1965) is a 59 y.o. female, Established patient, here for evaluation of the following chief complaint(s):  Follow-up and Medicare AWV          Subjective   History of Present Illness      The patient presents for evaluation of sluggishness, upset stomach, allergies, fibromyalgia, and anxiety.    She reports feeling sluggish with an upset stomach that began on the evening of 2025. Her condition improved by the evening of 2025 but worsened again today. She describes a sour sensation in her stomach and feels slightly nauseous. Despite these symptoms, she has been able to eat, consuming chicken broth this morning and Swedish meatballs last night. She has not experienced any diarrhea or constipation, although she notes her stool is yellow. She also reports feeling tired and has been sleeping more than usual, from 6:30 PM last night until around 4:30 or 5:00 AM this morning. She has not had any fevers.    She is currently taking Allegra for her allergies, which she reports have been acting up recently. She has attempted to reorder her medication through [employer].    She continues to take Flexeril at night for her fibromyalgia, which she finds helpful. However, she has not needed to take it recently due to her fatigue.    She takes Ativan 1 mg before flying.    Past Medical History:   Diagnosis Date    Asthma     Breast cancer ([facility]) 2018    left breast    Chronic back pain greater than 3 months duration     Dr Flynn    COPD (chronic obstructive pulmonary disease) ([facility])     Dr Hubbard    Depression     Sergey Guan ([place]), hospital admissions, counseling [facility]    Family history of malignant neoplasm of breast     H/O vitamin D deficiency     History of therapeutic radiation 2018    left breast    Invasive ductal carcinoma of left breast ([facility]) 2018    T1N0M0 ERPR+ her2-, radiation, no chemo, Dr Pearson    Spondylosis     Dr Flynn     Past Surgical History:

## 2025-07-04 LAB — THYROXINE (T4): 6.2 UG/DL (ref 4.5–11.7)

## (undated) DEVICE — PENCIL ES L3M BTTN SWCH HOLSTER W/ BLDE ELECTRD EDGE

## (undated) DEVICE — TRAY PREP DRY W/ PREM GLV 2 APPL 6 SPNG 2 UNDPD 1 OVERWRAP

## (undated) DEVICE — SUTURE VCRL + SZ 3-0 L27IN ABSRB UD L26MM SH 1/2 CIR VCP416H

## (undated) DEVICE — MEDI-VAC YANKAUER SUCTION HANDLE W/BULBOUS TIP: Brand: CARDINAL HEALTH

## (undated) DEVICE — SUTURE VCRL + SZ 4-0 L18IN ABSRB UD L19MM PS-2 3/8 CIR PRIM VCP496H

## (undated) DEVICE — GLOVE SURG 5.5 PF POLYISOPRENE WHT STRL SENSICARE MIC

## (undated) DEVICE — PACK,LAPAROTOMY,NO GOWNS: Brand: MEDLINE

## (undated) DEVICE — COUNTER NDL 40 COUNT HLD 70 FOAM BLK ADH W/ MAG

## (undated) DEVICE — ELECTRODE PT RET AD L9FT HI MOIST COND ADH HYDRGEL CORDED

## (undated) DEVICE — INTENDED FOR TISSUE SEPARATION, AND OTHER PROCEDURES THAT REQUIRE A SHARP SURGICAL BLADE TO PUNCTURE OR CUT.: Brand: BARD-PARKER ® CARBON RIB-BACK BLADES

## (undated) DEVICE — SUTURE VCRL SZ 3-0 L54IN ABSRB VLT LIGAPAK REEL NDL J205G

## (undated) DEVICE — GOWN,AURORA,NONREINFORCED,LARGE: Brand: MEDLINE

## (undated) DEVICE — TUBING, SUCTION, 1/4" X 10', STRAIGHT: Brand: MEDLINE

## (undated) DEVICE — LABEL MED MINI W/ MARKER

## (undated) DEVICE — SPONGE: LAP 4X18 W XR 200/CS: Brand: MEDICAL ACTION INDUSTRIES

## (undated) DEVICE — SPONGE GZ W4XL4IN RAYON POLY FILL CVR W/ NONWOVEN FAB

## (undated) DEVICE — SUTURE PERMAHAND SZ 3-0 L30IN NONABSORBABLE BLK SH L26MM K832H

## (undated) DEVICE — MARKER SURG SKIN GENTIAN VLT REG TIP W/ 6IN RUL

## (undated) DEVICE — SYRINGE IRRIG 60ML SFT PLIABLE BLB EZ TO GRP 1 HND USE W/

## (undated) DEVICE — APPLIER CLP L9.38IN M LIG TI DISP STR RNG HNDL LIGACLP

## (undated) DEVICE — Z INACTIVE USE 2641837 CLIP LIG M BLU TI HRT SHP WIRE HORZ 600 PER BX

## (undated) DEVICE — SUTURE VCRL + SZ 3-0 L36IN ABSRB UD L36MM CT-1 1/2 CIR VCP944H

## (undated) DEVICE — Z DISCONTINUED NO SUB IDED CONNECTOR SURG HEMOSTATIC OPN FLD FOR BIOMATERIAL

## (undated) DEVICE — SHEET,DRAPE,53X77,STERILE: Brand: MEDLINE

## (undated) DEVICE — 3M™ STERI-STRIP™ REINFORCED ADHESIVE SKIN CLOSURES, R1547, 1/2 IN X 4 IN (12 MM X 100 MM), 6 STRIPS/ENVELOPE: Brand: 3M™ STERI-STRIP™